# Patient Record
Sex: MALE | Race: WHITE | Employment: OTHER
[De-identification: names, ages, dates, MRNs, and addresses within clinical notes are randomized per-mention and may not be internally consistent; named-entity substitution may affect disease eponyms.]

---

## 2021-05-18 ENCOUNTER — NURSE TRIAGE (OUTPATIENT)
Dept: OTHER | Facility: CLINIC | Age: 61
End: 2021-05-18

## 2021-05-18 NOTE — TELEPHONE ENCOUNTER
Received call from Ana at pre-service center [facility]) Teressa with Red Flag Complaint. Brief description of triage: Called for new patient appointment with Dr. Franklin Sky    Triage indicates for patient to see PCP in 5 days    Care advice provided, patient verbalizes understanding; denies any other questions or concerns; instructed to call back for any new or worsening symptoms. Writer provided warm transfer to [facility] at [facility] for appointment scheduling. Attention Provider: Thank you for allowing me to participate in the care of your patient. The patient was connected to triage in response to information provided to the [facility]. Please do not respond through this encounter as the response is not directed to a shared pool. Reason for Disposition   Patient wants to be seen    Answer Assessment - Initial Assessment Questions  1. BLOOD PRESSURE: \"What is the blood pressure? \" \"Did you take at least two measurements 5 minutes apart?\"      200/114 yesterday, second check not done. Today 139/90    2. ONSET: \"When did you take your blood pressure? \"      Morning and night. ic verapamil 120 mg q day, metoprolol 100 mg bid, lisinopril 30 mg q day. Paroxetine 40 mg q day, Seroquel/quetiapine 200 mg bid, hydroxyzine 25 mg tid, atorvastatin 40 mg q day, aspirin 81 mg q day, metformin 1000 mg bid. fish oil 1000 mg q day      3. HOW: \"How did you obtain the blood pressure? \" (e.g., visiting nurse, automatic home BP monitor)      Home auto bp cuff    4. HISTORY: \"Do you have a history of high blood pressure? \"      Yes    5. MEDICATIONS: Ilya Dodge you taking any medications for blood pressure? \" \"Have you missed any doses recently? \"      Yes    6. OTHER SYMPTOMS: \"Do you have any symptoms? \" (e.g., headache, chest pain, blurred vision, difficulty breathing, weakness)      Denies    7. PREGNANCY: \"Is there any chance you are pregnant? \" \"When was your last menstrual period? \"      N/a    Protocols used: HIGH BLOOD PRESSURE-ADULT-OH

## 2021-05-19 RX ORDER — VERAPAMIL HYDROCHLORIDE 120 MG/1
120 TABLET, FILM COATED ORAL DAILY
COMMUNITY
End: 2021-07-02 | Stop reason: ALTCHOICE

## 2021-05-19 RX ORDER — LISINOPRIL 30 MG/1
30 TABLET ORAL DAILY
COMMUNITY
End: 2021-06-04 | Stop reason: SDUPTHER

## 2021-05-19 RX ORDER — HYDROXYZINE HYDROCHLORIDE 25 MG/1
25 TABLET, FILM COATED ORAL 3 TIMES DAILY PRN
COMMUNITY
End: 2021-06-04 | Stop reason: ALTCHOICE

## 2021-05-19 RX ORDER — ATORVASTATIN CALCIUM 40 MG/1
40 TABLET, FILM COATED ORAL DAILY
COMMUNITY
End: 2022-01-26

## 2021-05-19 RX ORDER — ASPIRIN 81 MG/1
81 TABLET ORAL DAILY
COMMUNITY

## 2021-05-19 RX ORDER — QUETIAPINE FUMARATE 200 MG/1
200 TABLET, FILM COATED ORAL 2 TIMES DAILY
COMMUNITY
End: 2022-01-26 | Stop reason: SDUPTHER

## 2021-05-19 RX ORDER — PAROXETINE HYDROCHLORIDE 40 MG/1
40 TABLET, FILM COATED ORAL EVERY MORNING
COMMUNITY
End: 2021-07-14 | Stop reason: DRUGHIGH

## 2021-05-19 RX ORDER — METOPROLOL TARTRATE 100 MG/1
100 TABLET ORAL 2 TIMES DAILY
COMMUNITY
End: 2021-07-02 | Stop reason: ALTCHOICE

## 2021-05-19 SDOH — ECONOMIC STABILITY: HOUSING INSECURITY
IN THE LAST 12 MONTHS, WAS THERE A TIME WHEN YOU DID NOT HAVE A STEADY PLACE TO SLEEP OR SLEPT IN A SHELTER (INCLUDING NOW)?: NO

## 2021-05-19 SDOH — ECONOMIC STABILITY: FOOD INSECURITY: WITHIN THE PAST 12 MONTHS, THE FOOD YOU BOUGHT JUST DIDN'T LAST AND YOU DIDN'T HAVE MONEY TO GET MORE.: NEVER TRUE

## 2021-05-19 SDOH — ECONOMIC STABILITY: TRANSPORTATION INSECURITY
IN THE PAST 12 MONTHS, HAS LACK OF TRANSPORTATION KEPT YOU FROM MEETINGS, WORK, OR FROM GETTING THINGS NEEDED FOR DAILY LIVING?: NO

## 2021-05-19 ASSESSMENT — COLUMBIA-SUICIDE SEVERITY RATING SCALE - C-SSRS
1. WITHIN THE PAST MONTH, HAVE YOU WISHED YOU WERE DEAD OR WISHED YOU COULD GO TO SLEEP AND NOT WAKE UP?: NO
2. HAVE YOU ACTUALLY HAD ANY THOUGHTS OF KILLING YOURSELF?: NO

## 2021-05-19 ASSESSMENT — PATIENT HEALTH QUESTIONNAIRE - PHQ9
3. TROUBLE FALLING OR STAYING ASLEEP: 0
4. FEELING TIRED OR HAVING LITTLE ENERGY: 3
7. TROUBLE CONCENTRATING ON THINGS, SUCH AS READING THE NEWSPAPER OR WATCHING TELEVISION: 3
1. LITTLE INTEREST OR PLEASURE IN DOING THINGS: 3
6. FEELING BAD ABOUT YOURSELF - OR THAT YOU ARE A FAILURE OR HAVE LET YOURSELF OR YOUR FAMILY DOWN: 0
SUM OF ALL RESPONSES TO PHQ9 QUESTIONS 1 & 2: 6
2. FEELING DOWN, DEPRESSED OR HOPELESS: 3
8. MOVING OR SPEAKING SO SLOWLY THAT OTHER PEOPLE COULD HAVE NOTICED. OR THE OPPOSITE, BEING SO FIGETY OR RESTLESS THAT YOU HAVE BEEN MOVING AROUND A LOT MORE THAN USUAL: 0
SUM OF ALL RESPONSES TO PHQ QUESTIONS 1-9: 14
5. POOR APPETITE OR OVEREATING: 2
SUM OF ALL RESPONSES TO PHQ QUESTIONS 1-9: 14

## 2021-05-20 ENCOUNTER — VIRTUAL VISIT (OUTPATIENT)
Dept: FAMILY MEDICINE CLINIC | Age: 61
End: 2021-05-20
Payer: COMMERCIAL

## 2021-05-20 DIAGNOSIS — F32.A DEPRESSION, UNSPECIFIED DEPRESSION TYPE: ICD-10-CM

## 2021-05-20 DIAGNOSIS — I10 ESSENTIAL HYPERTENSION: ICD-10-CM

## 2021-05-20 DIAGNOSIS — Z76.89 ENCOUNTER TO ESTABLISH CARE: ICD-10-CM

## 2021-05-20 DIAGNOSIS — I10 UNCONTROLLED HYPERTENSION: Primary | ICD-10-CM

## 2021-05-20 DIAGNOSIS — F41.9 ANXIETY: ICD-10-CM

## 2021-05-20 DIAGNOSIS — E11.9 TYPE 2 DIABETES MELLITUS WITHOUT COMPLICATION, WITHOUT LONG-TERM CURRENT USE OF INSULIN (HCC): ICD-10-CM

## 2021-05-20 DIAGNOSIS — Z13.31 POSITIVE DEPRESSION SCREENING: ICD-10-CM

## 2021-05-20 PROCEDURE — G8421 BMI NOT CALCULATED: HCPCS | Performed by: NURSE PRACTITIONER

## 2021-05-20 PROCEDURE — 3017F COLORECTAL CA SCREEN DOC REV: CPT | Performed by: NURSE PRACTITIONER

## 2021-05-20 PROCEDURE — 3046F HEMOGLOBIN A1C LEVEL >9.0%: CPT | Performed by: NURSE PRACTITIONER

## 2021-05-20 PROCEDURE — 2022F DILAT RTA XM EVC RTNOPTHY: CPT | Performed by: NURSE PRACTITIONER

## 2021-05-20 PROCEDURE — 1036F TOBACCO NON-USER: CPT | Performed by: NURSE PRACTITIONER

## 2021-05-20 PROCEDURE — G8431 POS CLIN DEPRES SCRN F/U DOC: HCPCS | Performed by: NURSE PRACTITIONER

## 2021-05-20 PROCEDURE — G8427 DOCREV CUR MEDS BY ELIG CLIN: HCPCS | Performed by: NURSE PRACTITIONER

## 2021-05-20 PROCEDURE — 99204 OFFICE O/P NEW MOD 45 MIN: CPT | Performed by: NURSE PRACTITIONER

## 2021-05-20 RX ORDER — BUSPIRONE HYDROCHLORIDE 5 MG/1
5 TABLET ORAL 2 TIMES DAILY
Qty: 60 TABLET | Refills: 0 | Status: SHIPPED | OUTPATIENT
Start: 2021-05-20 | End: 2021-06-04 | Stop reason: DRUGHIGH

## 2021-05-20 ASSESSMENT — ENCOUNTER SYMPTOMS
WHEEZING: 0
EYE ITCHING: 0
COLOR CHANGE: 0
SORE THROAT: 0
DIARRHEA: 0
CONSTIPATION: 0
CHEST TIGHTNESS: 0
ABDOMINAL PAIN: 0
NAUSEA: 0
SHORTNESS OF BREATH: 0
SINUS PRESSURE: 0
COUGH: 0
EYE REDNESS: 0

## 2021-05-20 NOTE — PROGRESS NOTES
on Metformin for his sugars. Past Medical History:   Diagnosis Date    Depression     Diabetes mellitus ([address]     Hypertension        History reviewed. No pertinent surgical history. Social History     Socioeconomic History    Marital status: Single     Spouse name: Not on file    Number of children: Not on file    Years of education: Not on file    Highest education level: Not on file   Occupational History    Not on file   Tobacco Use    Smoking status: Never Smoker    Smokeless tobacco: Never Used   Vaping Use    Vaping Use: Never used   Substance and Sexual Activity    Alcohol use: Not Currently    Drug use: Not Currently    Sexual activity: Not Currently   Other Topics Concern    Not on file   Social History Narrative    Not on file     Social Determinants of Health     Financial Resource Strain: Low Risk     Difficulty of Paying Living Expenses: Not hard at all   Food Insecurity: No Food Insecurity    Worried About [facility] [employer] in the Last Year: Never true    [place] in the Last Year: Never true   Transportation Needs: No Transportation Needs    Lack of Transportation (Medical): No    Lack of Transportation (Non-Medical):  No   Physical Activity:     Days of Exercise per Week:     Minutes of Exercise per Session:    Stress:     Feeling of Stress :    Social Connections:     Frequency of Communication with Friends and Family:     Frequency of Social Gatherings with Friends and Family:     Attends [employer] Services:     Active Member of Clubs or Organizations:     Attends Club or Organization Meetings:     Marital Status:    Intimate Partner Violence:     Fear of Current or Ex-Partner:     Emotionally Abused:     Physically Abused:     Sexually Abused:        Family History   Problem Relation Age of Onset    High Blood Pressure Mother     Depression Mother     Heart Disease Father     No Known Problems Sister     No Known Problems Brother     Heart Disease Maternal Grandmother     High Blood Pressure Maternal Grandmother     Heart Disease Maternal Grandfather     High Blood Pressure Maternal Grandfather        Current Outpatient Medications   Medication Sig Dispense Refill    metFORMIN (GLUCOPHAGE) 1000 MG tablet Take 1,000 mg by mouth 2 times daily (with meals)      metoprolol (LOPRESSOR) 100 MG tablet Take 100 mg by mouth 2 times daily      atorvastatin (LIPITOR) 40 MG tablet Take 40 mg by mouth daily      verapamil (CALAN) 120 MG tablet Take 120 mg by mouth daily      lisinopril (PRINIVIL;ZESTRIL) 30 MG tablet Take 30 mg by mouth daily      QUEtiapine (SEROQUEL) 200 MG tablet Take 200 mg by mouth 2 times daily      PARoxetine (PAXIL) 40 MG tablet Take 40 mg by mouth every morning      Omega-3 Fatty Acids (FISH OIL PO) Take 1,000 mg by mouth 3 times daily      hydrOXYzine (ATARAX) 25 MG tablet Take 25 mg by mouth 3 times daily as needed for Itching      aspirin 81 MG EC tablet Take 81 mg by mouth daily       No current facility-administered medications for this visit. No Known Allergies    No results found for any previous visit. Review of Systems   Constitutional: Negative for activity change, chills, fatigue, fever and unexpected weight change. HENT: Negative for sinus pressure and sore throat. Eyes: Negative for redness, itching and visual disturbance. Respiratory: Negative for cough, chest tightness, shortness of breath and wheezing. Cardiovascular: Negative for chest pain, palpitations and leg swelling. Gastrointestinal: Negative for abdominal pain, constipation, diarrhea and nausea. Endocrine: Negative for polydipsia, polyphagia and polyuria. Musculoskeletal: Negative for arthralgias, joint swelling and myalgias. Skin: Negative for color change, pallor and rash. Allergic/Immunologic: Negative for environmental allergies, food allergies and immunocompromised state.    Neurological: Positive for light-headedness. Negative for dizziness, syncope, weakness and headaches. Hematological: Does not bruise/bleed easily. Psychiatric/Behavioral: Positive for dysphoric mood and sleep disturbance. Negative for behavioral problems and hallucinations. The patient is nervous/anxious. There were no vitals taken for this visit. Patient-Reported Vitals 5/19/2021   Patient-Reported Weight 227   Patient-Reported Height 5'8\"   Patient-Reported Systolic 817   Patient-Reported Diastolic 775          Physical Exam  Constitutional:       Appearance: Normal appearance. He is obese. He is not ill-appearing. HENT:      Head: Normocephalic and atraumatic. Right Ear: External ear normal.      Left Ear: External ear normal.      Nose: Nose normal. No rhinorrhea. Mouth/Throat:      Mouth: Mucous membranes are moist.      Pharynx: Oropharynx is clear. Eyes:      General:         Right eye: No discharge. Left eye: No discharge. Conjunctiva/sclera: Conjunctivae normal.   Neck:      Trachea: Phonation normal.   Cardiovascular:      Comments: bp reading 212/118  Pulmonary:      Effort: Pulmonary effort is normal. No respiratory distress. Musculoskeletal:      Cervical back: Normal range of motion. Skin:     Coloration: Skin is not jaundiced or pale. Neurological:      General: No focal deficit present. Mental Status: He is alert and oriented to person, place, and time. Psychiatric:         Mood and Affect: Mood normal.         Behavior: Behavior normal.         Thought Content: Thought content normal.         Judgment: Judgment normal.         Diagnosis    ICD-10-CM    1. Uncontrolled hypertension  I10    2. Positive depression screening  Z13.31 Positive Screen for Clinical Depression with a Documented Follow-up Plan    3. Depression, unspecified depression type  F32.9    4.  Type 2 diabetes mellitus without complication, without long-term current use of insulin (HCC)  E11.9    5. Essential hypertension  I10    6. Encounter to establish care  Z76.89    7. Anxiety  F41.9        Assessment andPlan    Stop hydroxyzine since his elevations in blood pressure occurred when the hydroxyzine was started. Would definitely benefit from seeing a counselor and probably psychiatry  Blood pressure is currently uncontrolled, appointment made for the office for tomorrow where an EKG will be done, continue current medications I do feel that his anxiety is playing a role in his elevations in blood pressure. We will likely need referral to cardiology  We will place orders for lab work  Attempt to get records from previous provider  Start BuSpar for anxiety in addition to his Paxil and his Seroquel  Diabetes, appears currently stable we will get new hemoglobin A1c  Signs and symptoms to report to the emergency room discussed and patient is agreeable  Follow-up in office tomorrow  Orders Placed This Encounter   Procedures    Comprehensive Metabolic Panel     Standing Status:   Future     Standing Expiration Date:   5/20/2022    CBC Auto Differential     Standing Status:   Future     Standing Expiration Date:   5/20/2022    Hemoglobin A1C     Standing Status:   Future     Standing Expiration Date:   5/20/2022    TSH WITH REFLEX TO FT4     Standing Status:   Future     Standing Expiration Date:   5/20/2022    Lipid Panel     Standing Status:   Future     Standing Expiration Date:   5/20/2022     Order Specific Question:   Is Patient Fasting?/# of Hours     Answer:   no    Positive Screen for Clinical Depression with a Documented Follow-up Plan        Orders Placed This Encounter   Medications    busPIRone (BUSPAR) 5 MG tablet     Sig: Take 1 tablet by mouth 2 times daily     Dispense:  60 tablet     Refill:  0       Return keep appointment tomorrow. Isabella Gómez, was evaluated through a synchronous (real-time) audio-video encounter.  The patient (or guardian if applicable) is aware that this is a billable service. Verbal consent to proceed has been obtained within the past 12 months. The visit was conducted pursuant to the emergency declaration under the [facility] [address] authority and the [employer] Act. Patient identification was verified, and a caregiver was present when appropriate. The patient was located in a state where the provider was credentialed to provide care. Total time spent for this encounter: Not billed by time    --JOSE RAMON Abreu CNP on 5/20/2021 at 8:41 AM    An electronic signature was used to authenticate this note. This visit was completed virtually using [email or web address]

## 2021-05-21 ENCOUNTER — OFFICE VISIT (OUTPATIENT)
Dept: FAMILY MEDICINE CLINIC | Age: 61
End: 2021-05-21
Payer: COMMERCIAL

## 2021-05-21 VITALS
OXYGEN SATURATION: 95 % | SYSTOLIC BLOOD PRESSURE: 158 MMHG | DIASTOLIC BLOOD PRESSURE: 96 MMHG | RESPIRATION RATE: 18 BRPM | WEIGHT: 228 LBS | TEMPERATURE: 97.9 F | HEART RATE: 70 BPM

## 2021-05-21 DIAGNOSIS — F32.A DEPRESSION, UNSPECIFIED DEPRESSION TYPE: ICD-10-CM

## 2021-05-21 DIAGNOSIS — F41.9 ANXIETY: ICD-10-CM

## 2021-05-21 DIAGNOSIS — R06.83 SNORING: ICD-10-CM

## 2021-05-21 DIAGNOSIS — E11.9 TYPE 2 DIABETES MELLITUS WITHOUT COMPLICATION, WITHOUT LONG-TERM CURRENT USE OF INSULIN (HCC): ICD-10-CM

## 2021-05-21 DIAGNOSIS — I10 UNCONTROLLED HYPERTENSION: ICD-10-CM

## 2021-05-21 DIAGNOSIS — I10 ESSENTIAL HYPERTENSION: Primary | ICD-10-CM

## 2021-05-21 LAB — HBA1C MFR BLD: 5.8 %

## 2021-05-21 PROCEDURE — 2022F DILAT RTA XM EVC RTNOPTHY: CPT | Performed by: NURSE PRACTITIONER

## 2021-05-21 PROCEDURE — 1036F TOBACCO NON-USER: CPT | Performed by: NURSE PRACTITIONER

## 2021-05-21 PROCEDURE — G8421 BMI NOT CALCULATED: HCPCS | Performed by: NURSE PRACTITIONER

## 2021-05-21 PROCEDURE — 93000 ELECTROCARDIOGRAM COMPLETE: CPT | Performed by: NURSE PRACTITIONER

## 2021-05-21 PROCEDURE — 3017F COLORECTAL CA SCREEN DOC REV: CPT | Performed by: NURSE PRACTITIONER

## 2021-05-21 PROCEDURE — 83036 HEMOGLOBIN GLYCOSYLATED A1C: CPT | Performed by: NURSE PRACTITIONER

## 2021-05-21 PROCEDURE — 3044F HG A1C LEVEL LT 7.0%: CPT | Performed by: NURSE PRACTITIONER

## 2021-05-21 PROCEDURE — G8427 DOCREV CUR MEDS BY ELIG CLIN: HCPCS | Performed by: NURSE PRACTITIONER

## 2021-05-21 PROCEDURE — 99214 OFFICE O/P EST MOD 30 MIN: CPT | Performed by: NURSE PRACTITIONER

## 2021-05-21 ASSESSMENT — ENCOUNTER SYMPTOMS
SHORTNESS OF BREATH: 0
GASTROINTESTINAL NEGATIVE: 1
WHEEZING: 0
CHEST TIGHTNESS: 0
COUGH: 0

## 2021-05-21 NOTE — PATIENT INSTRUCTIONS
Continue buspar twice daily until Monday and then increase to three times a day  Continue all blood pressure medications  Decrease metformin to 1/2 tablet twice daily  Schedule appointment for sleep evaluation

## 2021-05-21 NOTE — PROGRESS NOTES
5/21/2021    This is a 56 y.o. male   Chief Complaint   Patient presents with    Hypertension     BP was over 200's at yesterdays [facility] appt. this morning was 115/80.  Anxiety     going on for atleast 1 month. unknown what is cause     HTN: Blood pressures at home are running in the 200s over 100s primarily in the evening in the morning blood pressures are 120s 130s over 80s. He is currently on metoprolol, lisinopril, verapamil. He is not seeing cardiology. He denies any specific chest pain or palpitations. Overall he attributes his elevated blood pressures to his anxiety. DM: Longstanding history of diabetes he does not check his blood sugars. Currently on Metformin at thousand twice daily. Denies any symptoms of hyper or hypoglycemia but later indicated that he was having symptoms of hypoglycemia. Anxiety: States he has been an anxious man most of his life says that his anxiety runs in his family. Currently on Paxil 40, Seroquel 200 twice daily, and BuSpar 5 mg twice daily started yesterday. He was previously on hydroxyzine started by his last provider but states his blood pressures have gone up since starting. Hydroxyzine was stopped yesterday. He still complains of anxiety symptoms without any known triggers, difficulty sleeping difficulty concentrating and overall feeling anxious. Previously he has talked to a counselor, but did not feel like it did much for him. His last provider suggested that he go up to 60 mg on his Paxil but he has not done that yet. Past Medical History:   Diagnosis Date    Depression     Diabetes mellitus ([facility] [address]     Hypertension        No past surgical history on file.     Social History     Socioeconomic History    Marital status: Single     Spouse name: Not on file    Number of children: Not on file    Years of education: Not on file    Highest education level: Not on file   Occupational History    Not on file   Tobacco Use    Smoking status: Never Smoker    Smokeless tobacco: Never Used   Vaping Use    Vaping Use: Never used   Substance and Sexual Activity    Alcohol use: Not Currently    Drug use: Not Currently    Sexual activity: Not Currently   Other Topics Concern    Not on file   Social History Narrative    Not on file     Social Determinants of Health     Financial Resource Strain: Low Risk     Difficulty of Paying Living Expenses: Not hard at all   Food Insecurity: No Food Insecurity    Worried About Running Out of Food in the Last Year: Never true    Milagro of Food in the Last Year: Never true   Transportation Needs: No Transportation Needs    Lack of Transportation (Medical): No    Lack of Transportation (Non-Medical):  No   Physical Activity:     Days of Exercise per Week:     Minutes of Exercise per Session:    Stress:     Feeling of Stress :    Social Connections:     Frequency of Communication with Friends and Family:     Frequency of Social Gatherings with Friends and Family:     Attends [employer] Services:     Active Member of Clubs or Organizations:     Attends Club or Organization Meetings:     Marital Status:    Intimate Partner Violence:     Fear of Current or Ex-Partner:     Emotionally Abused:     Physically Abused:     Sexually Abused:        Family History   Problem Relation Age of Onset    Heart Disease Mother     High Blood Pressure Mother     Depression Mother     Heart Disease Father     Heart Attack Father     High Blood Pressure Sister     No Known Problems Brother     Heart Disease Maternal Grandmother     High Blood Pressure Maternal Grandmother     Heart Disease Maternal Grandfather     High Blood Pressure Maternal Grandfather     Heart Disease Paternal Grandmother     High Blood Pressure Paternal Grandmother     Heart Disease Paternal Grandfather     High Blood Pressure Paternal Grandfather        Current Outpatient Medications   Medication Sig Dispense Refill    busPIRone (BUSPAR) 5 MG tablet Take 1 tablet by mouth 2 times daily 60 tablet 0    metFORMIN (GLUCOPHAGE) 1000 MG tablet Take 1,000 mg by mouth 2 times daily (with meals)      metoprolol (LOPRESSOR) 100 MG tablet Take 100 mg by mouth 2 times daily      atorvastatin (LIPITOR) 40 MG tablet Take 40 mg by mouth daily      verapamil (CALAN) 120 MG tablet Take 120 mg by mouth daily      lisinopril (PRINIVIL;ZESTRIL) 30 MG tablet Take 30 mg by mouth daily      QUEtiapine (SEROQUEL) 200 MG tablet Take 200 mg by mouth 2 times daily      PARoxetine (PAXIL) 40 MG tablet Take 40 mg by mouth every morning      Omega-3 Fatty Acids (FISH OIL PO) Take 1,000 mg by mouth 3 times daily      hydrOXYzine (ATARAX) 25 MG tablet Take 25 mg by mouth 3 times daily as needed for Itching      aspirin 81 MG EC tablet Take 81 mg by mouth daily       No current facility-administered medications for this visit. No Known Allergies    No results found for any previous visit. Review of Systems   Constitutional: Positive for fatigue. Negative for activity change, diaphoresis, fever and unexpected weight change. HENT: Negative. Respiratory: Negative for cough, chest tightness, shortness of breath and wheezing. Snores   Cardiovascular: Negative for chest pain, palpitations and leg swelling. Gastrointestinal: Negative. Endocrine:        Episodes of confusion and hypoglycemic symptoms at least a few times a week, does not monitor sugars, does not watch diet   Genitourinary: Negative. Musculoskeletal: Negative. Neurological: Positive for dizziness (attributes this to anxiety), light-headedness and numbness (feet). Negative for seizures. Psychiatric/Behavioral: Positive for dysphoric mood and sleep disturbance. The patient is nervous/anxious.          Scribes himself as a anxious rohith, has always been this way       BP (!) 158/96 (Site: Left Upper Arm, Position: Sitting)   Pulse 70   Temp 97.9 °F (36.6 °C) (Infrared)   Resp 18   Wt Intact  Vibration (128 Hz): N/A     Lymphadenopathy:      Cervical: No cervical adenopathy. Skin:     General: Skin is warm and dry. Coloration: Skin is not pale. Findings: No erythema or rash. Neurological:      General: No focal deficit present. Mental Status: He is alert and oriented to person, place, and time. Motor: No abnormal muscle tone. Coordination: Coordination normal.   Psychiatric:         Behavior: Behavior normal.         Thought Content: Thought content normal.         Judgment: Judgment normal.         Diagnosis    ICD-10-CM    1. Essential hypertension  I10 EKG 12 Lead     [facility] Pulmonology     CANCELED: CBC Auto Differential     CANCELED: HEMOGLOBIN A1C     CANCELED: Lipid Panel     CANCELED: TSH WITH REFLEX TO FT4   2. Anxiety  F41.9 [facility] Pul[facility]ology     CANCELED: TSH WITH REFLEX TO FT4   3. Type 2 diabetes mellitus without complication, without long-term current use of insulin (HCC)  E11.9 POCT glycosylated hemoglobin (Hb A1C)     Diabetic Foot Exam     CANCELED: Lipid Panel     CANCELED: TSH WITH REFLEX TO FT4     CANCELED: Hemoglobin A1C     CANCELED: CBC Auto Differential     CANCELED: Comprehensive Metabolic Panel     CANCELED: COMPREHENSIVE METABOLIC PANEL     CANCELED: Lipid Panel     CANCELED: TSH WITH REFLEX TO FT4   4. Snoring  R06.83 [facility] Pulmonology   5. Uncontrolled hypertension  I10 CANCELED: Lipid Panel     CANCELED: TSH WITH REFLEX TO FT4     CANCELED: Hemoglobin A1C     CANCELED: CBC Auto Differential     CANCELED: Comprehensive Metabolic Panel     CANCELED: HEMOGLOBIN A1C     CANCELED: Lipid Panel   6.  Depression, unspecified depression type  F32.9 CANCELED: TSH WITH REFLEX TO FT4       Assessment andPlan    Hypertension appears to be very labile, well controlled in the morning  Continue current medications  Check labs today  Placing referral for sleep study as this could be contributing to the hypertension  Anxiety is definitely playing a role, continue BuSpar twice daily until Monday and then increase to 3 times a day  We will likely need referral to psychiatry  Diabetes, hemoglobin A1c 5.8 today with patient now voicing episodes of hypoglycemia at least 2 or 3 times a week we have decreased Metformin to 500 twice daily  Orders Placed This Encounter   Procedures   [facility] Pulmonology     Referral Priority:   Routine     Referral Type:   Eval and Treat     Referral Reason:   Specialty Services Required     Requested Specialty:   Pulmonology     Number of Visits Requested:   1    POCT glycosylated hemoglobin (Hb A1C)    EKG 12 Lead     Scheduling Instructions:      Physical     Order Specific Question:   Reason for Exam?     Answer: Other    Diabetic Foot Exam       No orders of the defined types were placed in this encounter. Return in about 2 weeks (around 6/4/2021).   For anxiety and blood pressure, if blood pressures remain elevated will place referral to cardiology

## 2021-06-04 ENCOUNTER — OFFICE VISIT (OUTPATIENT)
Dept: FAMILY MEDICINE CLINIC | Age: 61
End: 2021-06-04
Payer: COMMERCIAL

## 2021-06-04 VITALS
DIASTOLIC BLOOD PRESSURE: 98 MMHG | SYSTOLIC BLOOD PRESSURE: 172 MMHG | RESPIRATION RATE: 18 BRPM | TEMPERATURE: 96.6 F | OXYGEN SATURATION: 98 % | HEART RATE: 87 BPM | WEIGHT: 223 LBS

## 2021-06-04 DIAGNOSIS — I10 UNCONTROLLED HYPERTENSION: ICD-10-CM

## 2021-06-04 DIAGNOSIS — F41.9 ANXIETY: Primary | ICD-10-CM

## 2021-06-04 DIAGNOSIS — F32.A DEPRESSION, UNSPECIFIED DEPRESSION TYPE: ICD-10-CM

## 2021-06-04 PROCEDURE — G8427 DOCREV CUR MEDS BY ELIG CLIN: HCPCS | Performed by: NURSE PRACTITIONER

## 2021-06-04 PROCEDURE — G8421 BMI NOT CALCULATED: HCPCS | Performed by: NURSE PRACTITIONER

## 2021-06-04 PROCEDURE — 3017F COLORECTAL CA SCREEN DOC REV: CPT | Performed by: NURSE PRACTITIONER

## 2021-06-04 PROCEDURE — 1036F TOBACCO NON-USER: CPT | Performed by: NURSE PRACTITIONER

## 2021-06-04 PROCEDURE — 99214 OFFICE O/P EST MOD 30 MIN: CPT | Performed by: NURSE PRACTITIONER

## 2021-06-04 RX ORDER — BUSPIRONE HYDROCHLORIDE 10 MG/1
10 TABLET ORAL 3 TIMES DAILY
Qty: 90 TABLET | Refills: 0 | Status: SHIPPED | OUTPATIENT
Start: 2021-06-04 | End: 2021-06-28

## 2021-06-04 RX ORDER — LISINOPRIL 40 MG/1
40 TABLET ORAL DAILY
Qty: 30 TABLET | Refills: 0 | Status: SHIPPED | OUTPATIENT
Start: 2021-06-04 | End: 2021-06-28

## 2021-06-04 ASSESSMENT — ENCOUNTER SYMPTOMS
SINUS PAIN: 0
EYE PAIN: 0
CHEST TIGHTNESS: 0
DIARRHEA: 0
SINUS PRESSURE: 0
NAUSEA: 0
EYE DISCHARGE: 0
CONSTIPATION: 0
COUGH: 0
EYE ITCHING: 0
SORE THROAT: 0
EYE REDNESS: 0
VOMITING: 0
SHORTNESS OF BREATH: 0
RHINORRHEA: 0

## 2021-06-04 NOTE — PROGRESS NOTES
6/4/2021    This is a 56 y.o. male   Chief Complaint   Patient presents with    Anxiety     pt states he still has anxiety \"real bad\" and the medication is not helping    Hypertension     still high   . HPI: Beryle Char is here to follow up on his hypertension and anxiety. HTN: Has been taking medications as prescribed. Regularly checks BP at home. Blood pressures 140s-180's/ 100's. Denies chest pain, palpitations, or shortness of breath. Says he has experienced increased fatigue and is unable to exercise. Denies orthopnea. Anxiety: Anxiety has not improved. Patient says this all started about a year ago, but he's been on anxiety and mental health meds for many years. He cant attribute anything to his anxiety other than the pandemic. He has never had counseling to address his anxiety. The anxiety is worse in the morning and throughout the day. Seems to get better at night. No issues sleeping, but states he has to take his paxil at night in order to sleep. He wants to start ativan because he feels the buspar is not helping. However he has only been taking the buspar 3 times daily for 2 weeks.       Patient Active Problem List   Diagnosis    Depression    Diabetes mellitus ([facility] [address]    Hypertension    Anxiety       Current Outpatient Medications   Medication Sig Dispense Refill    busPIRone (BUSPAR) 5 MG tablet Take 1 tablet by mouth 2 times daily 60 tablet 0    metFORMIN (GLUCOPHAGE) 1000 MG tablet Take 1,000 mg by mouth 2 times daily (with meals)      metoprolol (LOPRESSOR) 100 MG tablet Take 100 mg by mouth 2 times daily      atorvastatin (LIPITOR) 40 MG tablet Take 40 mg by mouth daily      verapamil (CALAN) 120 MG tablet Take 120 mg by mouth daily      lisinopril (PRINIVIL;ZESTRIL) 30 MG tablet Take 30 mg by mouth daily      QUEtiapine (SEROQUEL) 200 MG tablet Take 200 mg by mouth 2 times daily      PARoxetine (PAXIL) 40 MG tablet Take 40 mg by mouth every morning      Omega-3 Fatty Acids (FISH Refill:  0    lisinopril (PRINIVIL;ZESTRIL) 40 MG tablet     Sig: Take 1 tablet by mouth daily     Dispense:  30 tablet     Refill:  0       Patient Education:  Plan    Return in about 4 weeks (around 7/2/2021) for Mental health.

## 2021-06-14 ENCOUNTER — TELEPHONE (OUTPATIENT)
Dept: FAMILY MEDICINE CLINIC | Age: 61
End: 2021-06-14

## 2021-06-14 ENCOUNTER — VIRTUAL VISIT (OUTPATIENT)
Dept: PSYCHOLOGY | Age: 61
End: 2021-06-14
Payer: COMMERCIAL

## 2021-06-14 DIAGNOSIS — F41.9 ANXIETY: Primary | ICD-10-CM

## 2021-06-14 PROCEDURE — 90791 PSYCH DIAGNOSTIC EVALUATION: CPT | Performed by: SOCIAL WORKER

## 2021-06-14 NOTE — PROGRESS NOTES
[address], [address] [place] [address] St:  Patient presents with concerns about anxiety and reports symptoms as \"nervous\" \"lightheaded\" \"brain fog. \"  Pt reports that he takes Paxil and Seroquel at night, which help with sleep. He denies sleep problems. Pt states \"all day I'm very nervous. \" Symptoms have been present for 6 weeks. Pt was not able to identify triggers stating symptoms \"came for no reason. \" Patient states \"in the evening I feel better, in the daytime I feel very nervous. \" Goals for treatment include \"feel better\" by decreasing anxiety symptoms. .    Patient started buspirone three weeks ago, denies side effects, but does not feel that the medication is helping him. Pt states that he takes it as prescribed. Pt was encouraged to continue to take medications as prescribed, and f/u with PCP in a couple of weeks to determine efficacy. Pt reports having sad mood, poor appetite, stating \"I'm not hungry,\" but he continues to eat 3 meals/day. He reports no sleep issues due to taking medications, low energy, anhedonia. Patient endorses fatigue. Patient states that depressive symptoms are a result of anxiety, and states potential trigger could be the COVID pandemic. Patient reports having \"extremely high blood pressure. \" When patient wakes up his blood pressure is controlled, but by the afternoon it increases to high numbers. Patient reports \"I get nervous before I check my blood pressure. \" \"I get nervous and I don't know why. \"     Patient states that he has been taking Seroquel for 15 years, but feels like it doesn't work during the day. He states that he started taking it 15 years ago for same reasons (anxiety). Patient lives with mother and cousin. Patient does not work. Daily routine is non-existent. Daily caffeine use 1 cup of tea/day. No cigarette use, no vaping. No alcohol use. No illegal drug use. Patient spends 6-8 hours a day on social media or watching TV, \"watches the news a lot. \" There is no regular exercise. Patient describes normal sleep pattern due to taking medications. Patient enjoys the following hobbies: cutting grass. Patient describes family (mom, cousin, brother, sister) as social support. Patient identifies with Baptism (Christianity/spirituality/culture). Family history is positive for anxiety.     O:  MSE:    Appearance: good hygiene   Attitude: cooperative and friendly  Consciousness: alert  Orientation: oriented to person, place, time, general circumstance  Memory    recent and remote memory intact  Attention/Concentration    intact  Psychomotor Activity:normal  Eye Contact: normal  Speech: normal rate and volume, well-articulated  Mood    Anxious  Affect    flat affect  Perception: within normal limits  Thought Content: within normal limits  Thought Process: logical, coherent and goal-directed  Associations    logical connections  Insight: fair  Judgment    Intact  Appetite poor  Sleep disturbance No  Fatigue No  Loss of pleasure Yes  Impulsive behavior No  Morbid Ideation: no   Suicide Assessment: no suicidal ideation, plan, or intent  Homicidal Ideation: no    History:    Medications:   Current Outpatient Medications   Medication Sig Dispense Refill    busPIRone (BUSPAR) 10 MG tablet Take 1 tablet by mouth 3 times daily 90 tablet 0    lisinopril (PRINIVIL;ZESTRIL) 40 MG tablet Take 1 tablet by mouth daily 30 tablet 0    metFORMIN (GLUCOPHAGE) 1000 MG tablet Take 1,000 mg by mouth 2 times daily (with meals)      metoprolol (LOPRESSOR) 100 MG tablet Take 100 mg by mouth 2 times daily      atorvastatin (LIPITOR) 40 MG tablet Take 40 mg by mouth daily      verapamil (CALAN) 120 MG tablet Take 120 mg by mouth daily      QUEtiapine (SEROQUEL) 200 MG tablet Take 200 mg by mouth 2 times daily      PARoxetine (PAXIL) 40 MG tablet Take 40 mg by mouth every morning      Omega-3 Fatty Acids (FISH OIL PO) Take 1,000 mg by mouth 3 times daily      aspirin 81 MG EC tablet Take 81 mg by mouth daily       No current facility-administered medications for this visit. Social History:   Social History     Socioeconomic History    Marital status: Single     Spouse name: Not on file    Number of children: Not on file    Years of education: Not on file    Highest education level: Not on file   Occupational History    Not on file   Tobacco Use    Smoking status: Never Smoker    Smokeless tobacco: Never Used   Vaping Use    Vaping Use: Never used   Substance and Sexual Activity    Alcohol use: Not Currently    Drug use: Not Currently    Sexual activity: Not Currently   Other Topics Concern    Not on file   Social History Narrative    Not on file     Social Determinants of Health     Financial Resource Strain: Low Risk     Difficulty of Paying Living Expenses: Not hard at all   Food Insecurity: No Food Insecurity    Worried About [address] in the Last Year: Never true    [address] in the Last Year: Never true   Transportation Needs: No Transportation Needs    Lack of Transportation (Medical): No    Lack of Transportation (Non-Medical): No   Physical Activity:     Days of Exercise per Week:     Minutes of Exercise per Session:    Stress:     Feeling of Stress :    Social Connections:     Frequency of Communication with Friends and Family:     Frequency of Social Gatherings with Friends and Family:     Attends Nondenominational Services:     Active Member of Clubs or Organizations:     Attends Club or Organization Meetings:     Marital Status:    Intimate Partner Violence:     Fear of Current or Ex-Partner:     Emotionally Abused:     Physically Abused:     Sexually Abused:      TOBACCO:   reports that he has never smoked. He has never used smokeless tobacco.  ETOH:   reports previous alcohol use.   Family History:   Family History   Problem Relation Age of Onset    Heart Disease Mother     High Blood Pressure Mother     Depression Mother     Heart Disease Father     Heart Attack

## 2021-06-14 NOTE — PATIENT INSTRUCTIONS
1. Review handout on diaphragmatic (4-7-8) breathing. 2. F/U with Jade Calderonlizette in 2 weeks. 4 -7- 8 Breath Relaxation Exercise To manage Stress/Anxiety    Anyone can do it. ..    Simple    Quick    No equipment needed    Do it anywhere    Are the numbers important? The absolute time you spend on each phase is not important; the ratio of 4:7:8 is important. If you have trouble  holding your breath, speed the exercise up but keep to  the ratio of 4:7:8 for the three phases. With practice you  can slow it all down and get used to inhaling and exhaling more and more deeply. Why should I do it? This exercise is a natural tranquilizer for the nervous system. Unlike tranquilizing drugs, which are often effective  when you first take them but then lose their power over  time, this exercise is subtle when you first try it but gains  in power with repetition and practice. Use this new skill  whenever anything upsetting happens - before you react. Use it whenever you are aware of internal tension. Use it  to help you fall asleep. How often? Do it at least twice a day. You cannot do it too frequently. Do not do more than four breaths at one time for the first  month of practice. Later, if you wish, you can extend it to  eight breaths. If you feel a little lightheaded when you  first breathe this way, do not be concerned - it will pass. STEPS  Exhale completely through your mouth, making a whoosh sound. Close your mouth and inhale quietly through your nose to a mental count of 4. Hold your breath for a count of 7. Exhale completely through your mouth, making a whoosh sound to a count of 8. This is one breath. Now inhale again and repeat the cycle three more times for a total of four breaths. Beginner Tips:  Ideally, sit with your back straight. Place the tip of your tongue against the ridge of tissue just  behind your upper front teeth, and keep it there through the  entire exercise.   Exhale through your

## 2021-06-14 NOTE — TELEPHONE ENCOUNTER
[facility]   No Vm set up I sent a my chart message to advise he needs a follow up

## 2021-06-30 NOTE — PROGRESS NOTES
[address]   Cardiac Consultation    Referring Provider:  JOSE RAMON Michaels CNP     Chief Complaint   Patient presents with    New Patient     BP will go high with anxiety        History of Present Illness:  Dyanetta Him here as new pt for hypertension. He is states that he has anxiety and this makes his blood pressure go up. HTN present for years, has been more elevated recently. Essentially every day. No associated symptoms. Patient denies chest pain, sob, palpitations, dizziness or syncope. He can go up steps with no difficulty. He checks BP at home ranges > 150/100 in the morning and higher in the afternoon 180's/110. It's the same in both arms. He presents with cousin. He states that he is compliant with taking medications daily. Past Medical History:   has a past medical history of Depression, Diabetes mellitus ([address] and Hypertension. Surgical History:   has no past surgical history on file. Social History:   reports that he has never smoked. He has never used smokeless tobacco. He reports previous alcohol use. He reports previous drug use. Family History:  family history includes Depression in his mother; Heart Attack in his father; Heart Disease in his father, maternal grandfather, maternal grandmother, mother, paternal grandfather, and paternal grandmother; High Blood Pressure in his maternal grandfather, maternal grandmother, mother, paternal grandfather, paternal grandmother, and sister; No Known Problems in his brother. Home Medications:  Prior to Admission medications    Medication Sig Start Date End Date Taking?  Authorizing Provider   busPIRone (BUSPAR) 10 MG tablet TAKE 1 TABLET BY MOUTH THREE TIMES A DAY 6/28/21  Yes JOSE RAMON Michaels CNP   lisinopril (PRINIVIL;ZESTRIL) 40 MG tablet TAKE 1 TABLET BY MOUTH EVERY DAY 6/28/21  Yes JOSE RAMON Michaels CNP   metFORMIN (GLUCOPHAGE) 1000 MG tablet Take 1,000 mg by mouth 2 times daily (with meals)   Yes Historical Provider, MD   metoprolol (LOPRESSOR) 100 MG tablet Take 100 mg by mouth 2 times daily   Yes Historical Provider, MD   atorvastatin (LIPITOR) 40 MG tablet Take 40 mg by mouth daily   Yes Historical Provider, MD   verapamil (CALAN) 120 MG tablet Take 120 mg by mouth daily   Yes Historical Provider, MD   QUEtiapine (SEROQUEL) 200 MG tablet Take 200 mg by mouth 2 times daily   Yes Historical Provider, MD   PARoxetine (PAXIL) 40 MG tablet Take 40 mg by mouth every morning   Yes Historical Provider, MD   Omega-3 Fatty Acids (FISH OIL PO) Take 1,000 mg by mouth 3 times daily   Yes Historical Provider, MD   aspirin 81 MG EC tablet Take 81 mg by mouth daily   Yes Historical Provider, MD        Allergies:  Patient has no known allergies. Review of Systems:   · Constitutional: there has been no unanticipated weight loss. There's been no change in energy level, sleep pattern, or activity level. · Eyes: No visual changes or diplopia. No scleral icterus. · ENT: No Headaches, hearing loss or vertigo. No mouth sores or sore throat. · Cardiovascular: Reviewed in HPI  · Respiratory: No cough or wheezing, no sputum production. No hematemesis. · Gastrointestinal: No abdominal pain, appetite loss, blood in stools. No change in bowel or bladder habits. · Genitourinary: No dysuria, trouble voiding, or hematuria. · Musculoskeletal:  No gait disturbance, weakness or joint complaints. · Integumentary: No rash or pruritis. · Neurological: No headache, diplopia, change in muscle strength, numbness or tingling. No change in gait, balance, coordination, mood, affect, memory, mentation, behavior. · Psychiatric: No anxiety, no depression. · Endocrine: No malaise, fatigue or temperature intolerance. No excessive thirst, fluid intake, or urination. No tremor. · Hematologic/Lymphatic: No abnormal bruising or bleeding, blood clots or swollen lymph nodes.   · Allergic/Immunologic: No nasal congestion or hives.    Physical Examination:    Vitals:    07/02/21 0812   BP: 110/70   Pulse: 82   SpO2: 97%        BP recheck 122/80    Constitutional and General Appearance: NAD   Respiratory:  · Normal excursion and expansion without use of accessory muscles  · Resp Auscultation: Normal breath sounds without dullness  Cardiovascular:  · The apical impulses not displaced  · Heart tones are crisp and normal  · Cervical veins are not engorged  · The carotid upstroke is normal in amplitude and contour without delay or bruit  · Normal S1S2, No S3, No Murmur  · Peripheral pulses are symmetrical and full  · There is no clubbing, cyanosis of the extremities. · No edema  · Femoral Arteries: 2+ and equal  · Pedal Pulses: 2+ and equal   Abdomen:  · No masses or tenderness  · Liver/Spleen: No Abnormalities Noted  Neurological/Psychiatric:  · Alert and oriented in all spheres  · Moves all extremities well  · Exhibits normal gait balance and coordination  · No abnormalities of mood, affect, memory, mentation, or behavior are noted    EKG 5/21/2021  Sinus  Rhythm   WITHIN NORMAL LIMITS 63 BPM    Assessment:   HTN - asymptomatic, poor control  Anxiety - discussed relationship to elev BP and advised relaxation techniques. should further d/w PCP  Obesity - discussed relationship to HTN    Plan:  Stop lisinopril  Stop metoprolol  Increase Verapamil to 240 mg daily - take at night  Start Valsartan- -25 mg daily  Start Labetalol 100 mg twice a day  Renal artery ultrasound  Echocardiogram  LABS in 1-2 weeks BMP  Follow up in a month    This note was scribed in the presence of Toya Bosworth MD by Raghav Islas RN. The scribes documentation has been prepared under my direction and personally reviewed by me in its entirety. I confirm that the note above accurately reflects all work, treatment, procedures, and medical decision making performed by me.     Dr. Toya Bosworth, MD    Thank you for allowing me to participate in the care of this individual.      Radha Verde.  Anibal Davis M.D., Cristhian Gomez

## 2021-07-02 ENCOUNTER — OFFICE VISIT (OUTPATIENT)
Dept: CARDIOLOGY CLINIC | Age: 61
End: 2021-07-02
Payer: COMMERCIAL

## 2021-07-02 VITALS
WEIGHT: 223 LBS | HEART RATE: 82 BPM | OXYGEN SATURATION: 97 % | DIASTOLIC BLOOD PRESSURE: 70 MMHG | BODY MASS INDEX: 33.8 KG/M2 | HEIGHT: 68 IN | SYSTOLIC BLOOD PRESSURE: 110 MMHG

## 2021-07-02 DIAGNOSIS — F41.9 ANXIETY: ICD-10-CM

## 2021-07-02 DIAGNOSIS — I10 ESSENTIAL HYPERTENSION: Primary | ICD-10-CM

## 2021-07-02 PROCEDURE — 99244 OFF/OP CNSLTJ NEW/EST MOD 40: CPT | Performed by: INTERNAL MEDICINE

## 2021-07-02 PROCEDURE — G8427 DOCREV CUR MEDS BY ELIG CLIN: HCPCS | Performed by: INTERNAL MEDICINE

## 2021-07-02 PROCEDURE — G8417 CALC BMI ABV UP PARAM F/U: HCPCS | Performed by: INTERNAL MEDICINE

## 2021-07-02 PROCEDURE — 93975 VASCULAR STUDY: CPT | Performed by: INTERNAL MEDICINE

## 2021-07-02 RX ORDER — VERAPAMIL HYDROCHLORIDE 240 MG/1
240 TABLET, FILM COATED, EXTENDED RELEASE ORAL NIGHTLY
Qty: 90 TABLET | Refills: 3 | Status: SHIPPED | OUTPATIENT
Start: 2021-07-02 | End: 2022-06-27 | Stop reason: SDUPTHER

## 2021-07-02 RX ORDER — VALSARTAN AND HYDROCHLOROTHIAZIDE 320; 25 MG/1; MG/1
1 TABLET, FILM COATED ORAL DAILY
Qty: 90 TABLET | Refills: 3 | Status: SHIPPED | OUTPATIENT
Start: 2021-07-02 | End: 2022-06-27 | Stop reason: SDUPTHER

## 2021-07-02 RX ORDER — LABETALOL 100 MG/1
100 TABLET, FILM COATED ORAL 2 TIMES DAILY
Qty: 180 TABLET | Refills: 3 | Status: SHIPPED | OUTPATIENT
Start: 2021-07-02 | End: 2022-06-27 | Stop reason: SDUPTHER

## 2021-07-02 NOTE — PATIENT INSTRUCTIONS
Plan:  Stop lisinopril  Stop metoprolol  Stop verapamil 120 mg   Start Verapamil 240 mg daily  Start Valsartan- -25 mg daily  Start Labetalol 100 mg twice a day  Call [phone] to schedule Renal artery ultrasound and echocardiogram  LABS in 1-2 weeks BMP  Follow up in a month

## 2021-07-14 ENCOUNTER — OFFICE VISIT (OUTPATIENT)
Dept: FAMILY MEDICINE CLINIC | Age: 61
End: 2021-07-14
Payer: COMMERCIAL

## 2021-07-14 VITALS
SYSTOLIC BLOOD PRESSURE: 142 MMHG | OXYGEN SATURATION: 97 % | DIASTOLIC BLOOD PRESSURE: 90 MMHG | WEIGHT: 224 LBS | HEART RATE: 96 BPM | RESPIRATION RATE: 16 BRPM | TEMPERATURE: 97.3 F | BODY MASS INDEX: 34.06 KG/M2

## 2021-07-14 DIAGNOSIS — I10 ESSENTIAL HYPERTENSION: ICD-10-CM

## 2021-07-14 DIAGNOSIS — F41.9 ANXIETY: Primary | ICD-10-CM

## 2021-07-14 DIAGNOSIS — E11.9 TYPE 2 DIABETES MELLITUS WITHOUT COMPLICATION, WITHOUT LONG-TERM CURRENT USE OF INSULIN (HCC): ICD-10-CM

## 2021-07-14 PROCEDURE — 3044F HG A1C LEVEL LT 7.0%: CPT | Performed by: NURSE PRACTITIONER

## 2021-07-14 PROCEDURE — 2022F DILAT RTA XM EVC RTNOPTHY: CPT | Performed by: NURSE PRACTITIONER

## 2021-07-14 PROCEDURE — 3017F COLORECTAL CA SCREEN DOC REV: CPT | Performed by: NURSE PRACTITIONER

## 2021-07-14 PROCEDURE — G8417 CALC BMI ABV UP PARAM F/U: HCPCS | Performed by: NURSE PRACTITIONER

## 2021-07-14 PROCEDURE — 1036F TOBACCO NON-USER: CPT | Performed by: NURSE PRACTITIONER

## 2021-07-14 PROCEDURE — 99213 OFFICE O/P EST LOW 20 MIN: CPT | Performed by: NURSE PRACTITIONER

## 2021-07-14 PROCEDURE — G8427 DOCREV CUR MEDS BY ELIG CLIN: HCPCS | Performed by: NURSE PRACTITIONER

## 2021-07-14 RX ORDER — PAROXETINE HYDROCHLORIDE 20 MG/1
20 TABLET, FILM COATED ORAL 2 TIMES DAILY
Qty: 60 TABLET | Refills: 1 | Status: SHIPPED | OUTPATIENT
Start: 2021-07-14 | End: 2021-09-10

## 2021-07-14 RX ORDER — HYDROXYZINE HYDROCHLORIDE 25 MG/1
TABLET, FILM COATED ORAL
COMMUNITY
Start: 2021-07-05 | End: 2021-07-14 | Stop reason: ALTCHOICE

## 2021-07-14 ASSESSMENT — ENCOUNTER SYMPTOMS
NAUSEA: 0
SHORTNESS OF BREATH: 0
DIARRHEA: 0
CONSTIPATION: 0
VOMITING: 0
SINUS PRESSURE: 0
CHEST TIGHTNESS: 0
SINUS PAIN: 0
RHINORRHEA: 0

## 2021-07-14 ASSESSMENT — PATIENT HEALTH QUESTIONNAIRE - PHQ9
3. TROUBLE FALLING OR STAYING ASLEEP: 0
SUM OF ALL RESPONSES TO PHQ QUESTIONS 1-9: 9
6. FEELING BAD ABOUT YOURSELF - OR THAT YOU ARE A FAILURE OR HAVE LET YOURSELF OR YOUR FAMILY DOWN: 0
7. TROUBLE CONCENTRATING ON THINGS, SUCH AS READING THE NEWSPAPER OR WATCHING TELEVISION: 3
SUM OF ALL RESPONSES TO PHQ9 QUESTIONS 1 & 2: 3
4. FEELING TIRED OR HAVING LITTLE ENERGY: 3
8. MOVING OR SPEAKING SO SLOWLY THAT OTHER PEOPLE COULD HAVE NOTICED. OR THE OPPOSITE, BEING SO FIGETY OR RESTLESS THAT YOU HAVE BEEN MOVING AROUND A LOT MORE THAN USUAL: 0
SUM OF ALL RESPONSES TO PHQ QUESTIONS 1-9: 9
1. LITTLE INTEREST OR PLEASURE IN DOING THINGS: 0
SUM OF ALL RESPONSES TO PHQ QUESTIONS 1-9: 9
9. THOUGHTS THAT YOU WOULD BE BETTER OFF DEAD, OR OF HURTING YOURSELF: 0
5. POOR APPETITE OR OVEREATING: 0
10. IF YOU CHECKED OFF ANY PROBLEMS, HOW DIFFICULT HAVE THESE PROBLEMS MADE IT FOR YOU TO DO YOUR WORK, TAKE CARE OF THINGS AT HOME, OR GET ALONG WITH OTHER PEOPLE: 0
2. FEELING DOWN, DEPRESSED OR HOPELESS: 3

## 2021-07-14 NOTE — PROGRESS NOTES
7/14/2021    This is a 59 y.o. male   Chief Complaint   Patient presents with    1 Month Follow-Up     high BP and dieting    . Alejandra is here to follow up on his mood, blood pressure, and diabetes. Anxiety: He is still experiencing anxiety on his current medications. He has the most relief at night when he takes his Paxil and Seroquel together. He did see the counselor via [employer] last month and felt like the visit went fine, but was not helpful for his anxiety. He has not checked with his insurance about psychiatrists in his network. Blood pressure: He is taking the medications as prescribed by the cardiologist. His home BP readings have been 130's-140's over 80's. He denies any side effects from the new medications. He denies any chest pain, palpitations, or shortness of breath. Diabetes: Taking medications as prescribed. Does not check sugars at home. Denies any symptoms of hypoglycemia.         Patient Active Problem List   Diagnosis    Depression    Diabetes mellitus ([facility] [address]    Hypertension    Anxiety       Current Outpatient Medications   Medication Sig Dispense Refill    hydrOXYzine (ATARAX) 25 MG tablet TAKE 1 TABLET BY MOUTH EVERY 8 HOURS AS NEEDED      valsartan-hydroCHLOROthiazide (DIOVAN-HCT) 320-25 MG per tablet Take 1 tablet by mouth daily 90 tablet 3    labetalol (NORMODYNE) 100 MG tablet Take 1 tablet by mouth 2 times daily 180 tablet 3    verapamil (CALAN SR) 240 MG extended release tablet Take 1 tablet by mouth nightly 90 tablet 3    busPIRone (BUSPAR) 10 MG tablet TAKE 1 TABLET BY MOUTH THREE TIMES A DAY 90 tablet 2    metFORMIN (GLUCOPHAGE) 1000 MG tablet Take 1,000 mg by mouth 2 times daily (with meals)      atorvastatin (LIPITOR) 40 MG tablet Take 40 mg by mouth daily      QUEtiapine (SEROQUEL) 200 MG tablet Take 200 mg by mouth 2 times daily      PARoxetine (PAXIL) 40 MG tablet Take 40 mg by mouth every morning      Omega-3 Fatty Acids (FISH OIL PO) Take 1,000 mg by mouth 3 times daily      aspirin 81 MG EC tablet Take 81 mg by mouth daily       No current facility-administered medications for this visit. No Known Allergies    BP (!) 142/90 (Site: Right Upper Arm, Position: Sitting)   Pulse 96   Temp 97.3 °F (36.3 °C) (Temporal)   Resp 16   Wt 224 lb (101.6 kg)   SpO2 97%   BMI 34.06 kg/m²     Social History     Tobacco Use    Smoking status: Never Smoker    Smokeless tobacco: Never Used   Substance Use Topics    Alcohol use: Not Currently       Review of Systems   Constitutional: Negative for activity change, appetite change, chills, fatigue and fever. HENT: Negative for congestion, ear discharge, ear pain, postnasal drip, rhinorrhea, sinus pressure and sinus pain. Eyes: Negative for visual disturbance. Respiratory: Negative for chest tightness and shortness of breath. Cardiovascular: Negative for chest pain, palpitations and leg swelling. Gastrointestinal: Negative for constipation, diarrhea, nausea and vomiting. Genitourinary: Negative. Musculoskeletal: Negative. Skin: Negative for rash and wound. Neurological: Negative for dizziness, syncope, light-headedness and headaches. Psychiatric/Behavioral: The patient is nervous/anxious. Physical Exam  Constitutional:       General: He is not in acute distress. Appearance: Normal appearance. He is not ill-appearing. HENT:      Head: Normocephalic and atraumatic. Right Ear: External ear normal.      Left Ear: External ear normal.      Nose: Nose normal. No congestion. Mouth/Throat:      Mouth: Mucous membranes are moist.      Pharynx: Oropharynx is clear. Eyes:      Extraocular Movements: Extraocular movements intact. Cardiovascular:      Rate and Rhythm: Normal rate and regular rhythm. Heart sounds: Normal heart sounds. Pulmonary:      Breath sounds: Normal breath sounds. Abdominal:      General: Abdomen is flat.  Bowel sounds are normal. Musculoskeletal:         General: Normal range of motion. Cervical back: Normal range of motion. Skin:     General: Skin is warm and dry. Neurological:      General: No focal deficit present. Mental Status: He is alert and oriented to person, place, and time. Psychiatric:         Attention and Perception: Attention normal.         Mood and Affect: Affect is flat. Speech: Speech normal.         Behavior: Behavior normal.         Thought Content: Thought content normal.         Diagnosis       ICD-10-CM    1. Anxiety  F41.9    2. Essential hypertension  I10    3. Type 2 diabetes mellitus without complication, without long-term current use of insulin (HCC)  E11.9         Plan    Continue current medications per cardiology  Switch paxil to 20 mg in the morning and 20 mg at night  Continue Buspar  Stop hydroxyzine  Encouraged to find a mental health provider     No orders of the defined types were placed in this encounter. Orders Placed This Encounter   Medications    PARoxetine (PAXIL) 20 MG tablet     Sig: Take 1 tablet by mouth 2 times daily     Dispense:  60 tablet     Refill:  1       Patient Education:  Plan    Return in about 8 weeks (around 9/8/2021) for Mental health.

## 2021-08-04 ENCOUNTER — HOSPITAL ENCOUNTER (OUTPATIENT)
Age: 61
Discharge: HOME | End: 2021-08-04
Payer: COMMERCIAL

## 2021-08-04 DIAGNOSIS — I10 ESSENTIAL HYPERTENSION: ICD-10-CM

## 2021-08-04 LAB
ANION GAP SERPL CALCULATED.3IONS-SCNC: 15 MMOL/L (ref 3–16)
BUN BLDV-MCNC: 18 MG/DL (ref 7–20)
CALCIUM SERPL-MCNC: 9.7 MG/DL (ref 8.3–10.6)
CHLORIDE BLD-SCNC: 98 MMOL/L (ref 99–110)
CO2: 23 MMOL/L (ref 21–32)
CREAT SERPL-MCNC: 1.2 MG/DL (ref 0.8–1.3)
GFR AFRICAN AMERICAN: >60
GFR NON-AFRICAN AMERICAN: >60
GLUCOSE BLD-MCNC: 138 MG/DL (ref 70–99)
POTASSIUM SERPL-SCNC: 4.5 MMOL/L (ref 3.5–5.1)
SODIUM BLD-SCNC: 136 MMOL/L (ref 136–145)

## 2021-08-04 PROCEDURE — 80048 BASIC METABOLIC PNL TOTAL CA: CPT

## 2021-08-04 PROCEDURE — 36415 COLL VENOUS BLD VENIPUNCTURE: CPT

## 2021-08-05 ENCOUNTER — TELEPHONE (OUTPATIENT)
Dept: CARDIOLOGY CLINIC | Age: 61
End: 2021-08-05

## 2021-08-05 NOTE — TELEPHONE ENCOUNTER
----- Message from Harvie Goodpasture, MD sent at 8/4/2021  4:52 PM EDT -----  Call  Labs ok  Continue current meds

## 2021-08-05 NOTE — TELEPHONE ENCOUNTER
Spoke with a woman, no one is listed on HIPAA from. LEILA to have pt contact the office for results.

## 2021-08-06 ENCOUNTER — OFFICE VISIT (OUTPATIENT)
Dept: CARDIOLOGY CLINIC | Age: 61
End: 2021-08-06
Payer: COMMERCIAL

## 2021-08-06 VITALS
HEART RATE: 87 BPM | HEIGHT: 68 IN | OXYGEN SATURATION: 96 % | WEIGHT: 224.6 LBS | DIASTOLIC BLOOD PRESSURE: 82 MMHG | SYSTOLIC BLOOD PRESSURE: 128 MMHG | BODY MASS INDEX: 34.04 KG/M2

## 2021-08-06 DIAGNOSIS — R42 DIZZINESS: ICD-10-CM

## 2021-08-06 DIAGNOSIS — I25.2 HISTORY OF MI (MYOCARDIAL INFARCTION): ICD-10-CM

## 2021-08-06 DIAGNOSIS — R94.31 ABNORMAL EKG: ICD-10-CM

## 2021-08-06 DIAGNOSIS — I10 ESSENTIAL HYPERTENSION: Primary | ICD-10-CM

## 2021-08-06 PROCEDURE — G8427 DOCREV CUR MEDS BY ELIG CLIN: HCPCS | Performed by: INTERNAL MEDICINE

## 2021-08-06 PROCEDURE — 1036F TOBACCO NON-USER: CPT | Performed by: INTERNAL MEDICINE

## 2021-08-06 PROCEDURE — 3017F COLORECTAL CA SCREEN DOC REV: CPT | Performed by: INTERNAL MEDICINE

## 2021-08-06 PROCEDURE — 99214 OFFICE O/P EST MOD 30 MIN: CPT | Performed by: INTERNAL MEDICINE

## 2021-08-06 PROCEDURE — G8417 CALC BMI ABV UP PARAM F/U: HCPCS | Performed by: INTERNAL MEDICINE

## 2021-08-06 NOTE — PATIENT INSTRUCTIONS
Plan:  Echocardiogram and renal US   Start taking your Verapamil in the mornings for better blood pressure control   Cardiac medications reviewed including indications and pertinent side effects    Check blood pressure and heart rate at home a few times per week- keep a log with dates and times and bring to office visit   Regular exercise and following a healthy diet encouraged   Follow up with me in one month   Your provider has ordered testing for further evaluation. An order/prescription has been included in your paper work.  To schedule outpatient testing, contact Central Scheduling by calling [address] [place] [address] ([phone]).

## 2021-08-06 NOTE — LETTER
Nurys Da Silva  1960        [address]   Cardiac Consultation    Referring Provider:  JOSE RAMON Hale CNP     Chief Complaint   Patient presents with    Follow-up    Dizziness     once in a while        History of Present Illness:   Nurys Da Silva is a 59 y.o. male who is here today for follow up for hypertension. Today he states he continues to have anxiety. His blood pressure at home runs 120-150's with the higher blood pressures seen in the evenings around 5 pm. He has been having dizziness at times, no syncope. He is tolerating his medications and taking them as prescribed. Patient currently denies any weight gain, edema, palpitations, chest pain, shortness of breath,  and syncope. Past Medical History:   has a past medical history of Depression, Diabetes mellitus ([address] and Hypertension. Surgical History:   has no past surgical history on file. Social History:   reports that he has never smoked. He has never used smokeless tobacco. He reports previous alcohol use. He reports previous drug use. Family History:  family history includes Depression in his mother; Heart Attack in his father; Heart Disease in his father, maternal grandfather, maternal grandmother, mother, paternal grandfather, and paternal grandmother; High Blood Pressure in his maternal grandfather, maternal grandmother, mother, paternal grandfather, paternal grandmother, and sister; No Known Problems in his brother. Home Medications:  Prior to Admission medications    Medication Sig Start Date End Date Taking?  Authorizing Provider   PARoxetine (PAXIL) 20 MG tablet Take 1 tablet by mouth 2 times daily 7/14/21  Yes JOSE RAMON Hale CNP   valsartan-hydroCHLOROthiazide (DIOVAN-HCT) 320-25 MG per tablet Take 1 tablet by mouth daily 7/2/21  Yes Nadege Salinas MD   labetalol (NORMODYNE) 100 MG tablet Take 1 tablet by mouth 2 times daily 7/2/21  Yes Nadege Salinas MD verapamil (CALAN SR) 240 MG extended release tablet Take 1 tablet by mouth nightly 7/2/21  Yes Cal Jorgensen MD   busPIRone (BUSPAR) 10 MG tablet TAKE 1 TABLET BY MOUTH THREE TIMES A DAY 6/28/21  Yes JOSE RAMON Guerrero CNP   metFORMIN (GLUCOPHAGE) 1000 MG tablet Take 1,000 mg by mouth 2 times daily (with meals)   Yes Historical Provider, MD   atorvastatin (LIPITOR) 40 MG tablet Take 40 mg by mouth daily   Yes Historical Provider, MD   QUEtiapine (SEROQUEL) 200 MG tablet Take 200 mg by mouth 2 times daily   Yes Historical Provider, MD   Omega-3 Fatty Acids (FISH OIL PO) Take 1,000 mg by mouth 3 times daily   Yes Historical Provider, MD   aspirin 81 MG EC tablet Take 81 mg by mouth daily   Yes Historical Provider, MD        Allergies:  Patient has no known allergies. Review of Systems:   · Constitutional: there has been no unanticipated weight loss. There's been no change in energy level, sleep pattern, or activity level. · Eyes: No visual changes or diplopia. No scleral icterus. · ENT: No Headaches, hearing loss or vertigo. No mouth sores or sore throat. · Cardiovascular: Reviewed in HPI  · Respiratory: No cough or wheezing, no sputum production. No hematemesis. · Gastrointestinal: No abdominal pain, appetite loss, blood in stools. No change in bowel or bladder habits. · Genitourinary: No dysuria, trouble voiding, or hematuria. · Musculoskeletal:  No gait disturbance, weakness or joint complaints. · Integumentary: No rash or pruritis. · Neurological: No headache, diplopia, change in muscle strength, numbness or tingling. No change in gait, balance, coordination, mood, affect, memory, mentation, behavior. · Psychiatric: No anxiety, no depression. · Endocrine: No malaise, fatigue or temperature intolerance. No excessive thirst, fluid intake, or urination. No tremor.   · Hematologic/Lymphatic: No abnormal bruising or bleeding, blood clots or swollen lymph nodes.  · Allergic/Immunologic: No nasal congestion or hives. Physical Examination:    Vitals:    08/06/21 1203   BP: 128/82   Pulse: 87   SpO2: 96%        BP recheck 122/80    Constitutional and General Appearance: NAD   Respiratory:  · Normal excursion and expansion without use of accessory muscles  · Resp Auscultation: Normal breath sounds without dullness  Cardiovascular:  · The apical impulses not displaced  · Heart tones are crisp and normal  · Cervical veins are not engorged  · The carotid upstroke is normal in amplitude and contour without delay or bruit  · Normal S1S2, No S3, No Murmur  · Peripheral pulses are symmetrical and full  · There is no clubbing, cyanosis of the extremities. · No edema  · Femoral Arteries: 2+ and equal  · Pedal Pulses: 2+ and equal   Abdomen:  · No masses or tenderness  · Liver/Spleen: No Abnormalities Noted  Neurological/Psychiatric:  · Alert and oriented in all spheres  · Moves all extremities well  · Exhibits normal gait balance and coordination  · No abnormalities of mood, affect, memory, mentation, or behavior are noted    EKG 5/21/2021  Sinus  Rhythm   WITHIN NORMAL LIMITS 63 BPM    Assessment:   HTN - improved post med changes last OV. Mild elev usually in evenings. Remains suboptimal.   Anxiety - again discussed relationship to elev BP and advised relaxation techniques. Obesity - again discussed relationship to HTN  Abnormal EKG - reviewed w/ pt. Possible prior MI. No symptoms   Dizziness meds and BP may be contributing   History of MI     Plan:  Echocardiogram and renal US   Start taking your Verapamil in the mornings for better blood pressure control   Cardiac medications reviewed including indications and pertinent side effects. Will not increase meds today. Will consider if BP remains elev at next visit.      Check blood pressure and heart rate at home a few times per week- keep a log with dates and times and bring to office visit   Regular exercise and following a healthy diet encouraged   Follow up with me in one month     Scribe's attestation: This note was scribed in the presence of Dr. Toya Bosworth M.D. By Prince Sher RN    The scribes documentation has been prepared under my direction and personally reviewed by me in its entirety. I confirm that the note above accurately reflects all work, treatment, procedures, and medical decision making performed by me. Dr. Toya Bosworth, MD    Thank you for allowing me to participate in the care of this individual.      Anabella Roth.  Corey Garcia M.D., Geovanna Perrin

## 2021-08-06 NOTE — PROGRESS NOTES
[address]   Cardiac Consultation    Referring Provider:  JOSE RAMON Soni CNP     Chief Complaint   Patient presents with    Follow-up    Dizziness     once in a while        History of Present Illness:   Laura Medrano is a 59 y.o. male who is here today for follow up for hypertension. Today he states he continues to have anxiety. His blood pressure at home runs 120-150's with the higher blood pressures seen in the evenings around 5 pm. He has been having dizziness at times, no syncope. He is tolerating his medications and taking them as prescribed. Patient currently denies any weight gain, edema, palpitations, chest pain, shortness of breath,  and syncope. Past Medical History:   has a past medical history of Depression, Diabetes mellitus ([address] and Hypertension. Surgical History:   has no past surgical history on file. Social History:   reports that he has never smoked. He has never used smokeless tobacco. He reports previous alcohol use. He reports previous drug use. Family History:  family history includes Depression in his mother; Heart Attack in his father; Heart Disease in his father, maternal grandfather, maternal grandmother, mother, paternal grandfather, and paternal grandmother; High Blood Pressure in his maternal grandfather, maternal grandmother, mother, paternal grandfather, paternal grandmother, and sister; No Known Problems in his brother. Home Medications:  Prior to Admission medications    Medication Sig Start Date End Date Taking?  Authorizing Provider   PARoxetine (PAXIL) 20 MG tablet Take 1 tablet by mouth 2 times daily 7/14/21  Yes JOSE RAMON Soni CNP   valsartan-hydroCHLOROthiazide (DIOVAN-HCT) 320-25 MG per tablet Take 1 tablet by mouth daily 7/2/21  Yes Jackie Durand MD   labetalol (NORMODYNE) 100 MG tablet Take 1 tablet by mouth 2 times daily 7/2/21  Yes Jackie Durand MD   verapamil (CALAN SR) 240 MG extended release tablet Take 1 tablet by mouth nightly 7/2/21  Yes Arnold Solis MD   busPIRone (BUSPAR) 10 MG tablet TAKE 1 TABLET BY MOUTH THREE TIMES A DAY 6/28/21  Yes JOSE RAMON Baltazar CNP   metFORMIN (GLUCOPHAGE) 1000 MG tablet Take 1,000 mg by mouth 2 times daily (with meals)   Yes Historical Provider, MD   atorvastatin (LIPITOR) 40 MG tablet Take 40 mg by mouth daily   Yes Historical Provider, MD   QUEtiapine (SEROQUEL) 200 MG tablet Take 200 mg by mouth 2 times daily   Yes Historical Provider, MD   Omega-3 Fatty Acids (FISH OIL PO) Take 1,000 mg by mouth 3 times daily   Yes Historical Provider, MD   aspirin 81 MG EC tablet Take 81 mg by mouth daily   Yes Historical Provider, MD        Allergies:  Patient has no known allergies. Review of Systems:   · Constitutional: there has been no unanticipated weight loss. There's been no change in energy level, sleep pattern, or activity level. · Eyes: No visual changes or diplopia. No scleral icterus. · ENT: No Headaches, hearing loss or vertigo. No mouth sores or sore throat. · Cardiovascular: Reviewed in HPI  · Respiratory: No cough or wheezing, no sputum production. No hematemesis. · Gastrointestinal: No abdominal pain, appetite loss, blood in stools. No change in bowel or bladder habits. · Genitourinary: No dysuria, trouble voiding, or hematuria. · Musculoskeletal:  No gait disturbance, weakness or joint complaints. · Integumentary: No rash or pruritis. · Neurological: No headache, diplopia, change in muscle strength, numbness or tingling. No change in gait, balance, coordination, mood, affect, memory, mentation, behavior. · Psychiatric: No anxiety, no depression. · Endocrine: No malaise, fatigue or temperature intolerance. No excessive thirst, fluid intake, or urination. No tremor. · Hematologic/Lymphatic: No abnormal bruising or bleeding, blood clots or swollen lymph nodes. · Allergic/Immunologic: No nasal congestion or hives.     Physical Examination:    Vitals:    08/06/21 1203   BP: 128/82   Pulse: 87   SpO2: 96%        BP recheck 122/80    Constitutional and General Appearance: NAD   Respiratory:  · Normal excursion and expansion without use of accessory muscles  · Resp Auscultation: Normal breath sounds without dullness  Cardiovascular:  · The apical impulses not displaced  · Heart tones are crisp and normal  · Cervical veins are not engorged  · The carotid upstroke is normal in amplitude and contour without delay or bruit  · Normal S1S2, No S3, No Murmur  · Peripheral pulses are symmetrical and full  · There is no clubbing, cyanosis of the extremities. · No edema  · Femoral Arteries: 2+ and equal  · Pedal Pulses: 2+ and equal   Abdomen:  · No masses or tenderness  · Liver/Spleen: No Abnormalities Noted  Neurological/Psychiatric:  · Alert and oriented in all spheres  · Moves all extremities well  · Exhibits normal gait balance and coordination  · No abnormalities of mood, affect, memory, mentation, or behavior are noted    EKG 5/21/2021  Sinus  Rhythm   WITHIN NORMAL LIMITS 63 BPM    Assessment:   HTN - improved post med changes last OV. Mild elev usually in evenings. Remains suboptimal.   Anxiety - again discussed relationship to elev BP and advised relaxation techniques. Obesity - again discussed relationship to HTN  Abnormal EKG - reviewed w/ pt. Possible prior MI. No symptoms   Dizziness meds and BP may be contributing   History of MI     Plan:  Echocardiogram and renal US   Start taking your Verapamil in the mornings for better blood pressure control   Cardiac medications reviewed including indications and pertinent side effects. Will not increase meds today. Will consider if BP remains elev at next visit.      Check blood pressure and heart rate at home a few times per week- keep a log with dates and times and bring to office visit   Regular exercise and following a healthy diet encouraged   Follow up with me in one month     Screline's attestation: This note was scribed in the presence of Dr. Varun Pack M.D. By Lala Olson RN    The scribes documentation has been prepared under my direction and personally reviewed by me in its entirety. I confirm that the note above accurately reflects all work, treatment, procedures, and medical decision making performed by me. Dr. Varun Pack MD    Thank you for allowing me to participate in the care of this individual.      Larene Cockayne.  Jodee Wadsworth M.D., [facility]

## 2021-08-31 ENCOUNTER — TELEPHONE (OUTPATIENT)
Dept: PULMONOLOGY | Age: 61
End: 2021-08-31

## 2021-08-31 NOTE — TELEPHONE ENCOUNTER
Appointment canceled for Sixto Pike (<[id number]>)   Visit Type: NEW PATIENT   Date        Time      Length    Provider                  Department   9/16/2021    8:45 AM  15 mins. Fausto Mendosa MD               [facility]      Reason for Cancellation: Patient preference

## 2021-09-10 RX ORDER — PAROXETINE HYDROCHLORIDE 20 MG/1
TABLET, FILM COATED ORAL
Qty: 60 TABLET | Refills: 1 | Status: SHIPPED | OUTPATIENT
Start: 2021-09-10 | End: 2021-11-10

## 2021-09-10 NOTE — TELEPHONE ENCOUNTER
Future Appointments   Date Time Provider Boone Marroquin   9/17/2021 10:15 AM Sisi Love MD [facility]     7/14/2021

## 2021-09-17 ENCOUNTER — OFFICE VISIT (OUTPATIENT)
Dept: CARDIOLOGY CLINIC | Age: 61
End: 2021-09-17
Payer: COMMERCIAL

## 2021-09-17 VITALS
SYSTOLIC BLOOD PRESSURE: 110 MMHG | HEIGHT: 68 IN | HEART RATE: 88 BPM | DIASTOLIC BLOOD PRESSURE: 60 MMHG | BODY MASS INDEX: 34.92 KG/M2 | WEIGHT: 230.4 LBS | OXYGEN SATURATION: 95 %

## 2021-09-17 DIAGNOSIS — G47.30 SLEEP APNEA, UNSPECIFIED TYPE: Primary | ICD-10-CM

## 2021-09-17 DIAGNOSIS — R53.82 CHRONIC FATIGUE: ICD-10-CM

## 2021-09-17 DIAGNOSIS — I10 ESSENTIAL HYPERTENSION: ICD-10-CM

## 2021-09-17 PROCEDURE — 3017F COLORECTAL CA SCREEN DOC REV: CPT | Performed by: INTERNAL MEDICINE

## 2021-09-17 PROCEDURE — 99214 OFFICE O/P EST MOD 30 MIN: CPT | Performed by: INTERNAL MEDICINE

## 2021-09-17 PROCEDURE — G8417 CALC BMI ABV UP PARAM F/U: HCPCS | Performed by: INTERNAL MEDICINE

## 2021-09-17 PROCEDURE — G8427 DOCREV CUR MEDS BY ELIG CLIN: HCPCS | Performed by: INTERNAL MEDICINE

## 2021-09-17 PROCEDURE — 1036F TOBACCO NON-USER: CPT | Performed by: INTERNAL MEDICINE

## 2021-09-17 NOTE — LETTER
[address]  [address] [facility]  Phone: [phone]  Fax: [phone]    Cal Jorgensen MD    September 20, 2021     Yoel Colorado, APRN - CNP  [address] [place] [address]    Patient: Frederic Ear   MR Number: <[id number]>   YOB: 1960   Date of Visit: 9/17/2021       Dear Yoel Colorado: Thank you for referring Nava Soriano to me for evaluation/treatment. Below are the relevant portions of my assessment and plan of care. If you have questions, please do not hesitate to call me. I look forward to following Vincent Mendes along with you.     Sincerely,      Cal Jorgensen MD

## 2021-09-17 NOTE — PROGRESS NOTES
[address]   Cardiac Consultation    Referring Provider:  JOSE RAMON Yoon CNP     Chief Complaint   Patient presents with    Follow-up     4 weeks. Pt reports that he still feels \"nervousness\" since last [place]   1960    History of Present Illness:   Teresa Santo is a 59 y.o. male who is here today for follow up for hypertension, dizziness, remote MI, and anxiety. At his last visit an echocardiogram and renal US were ordered but not yet completed. He ws instructed to take Verapamil in the mornings. Today he states he has been having fatigue and nervousness since his last visit. He has not been driving due to his symptoms. He has never been tested for sleep apnea. He snores. He is monitoring his blood pressure at home and it seems controlled in the mornings bit is running 140-150's by afternoon. C/O chronic fatigue. Patient currently denies any weight gain, edema, palpitations, chest pain, shortness of breath, dizziness and syncope. Past Medical History:   has a past medical history of Depression, Diabetes mellitus ([address] and Hypertension. Surgical History:   has no past surgical history on file. Social History:   reports that he has never smoked. He has never used smokeless tobacco. He reports previous alcohol use. He reports previous drug use. Family History:  family history includes Depression in his mother; Heart Attack in his father; Heart Disease in his father, maternal grandfather, maternal grandmother, mother, paternal grandfather, and paternal grandmother; High Blood Pressure in his maternal grandfather, maternal grandmother, mother, paternal grandfather, paternal grandmother, and sister; No Known Problems in his brother. Home Medications:  Prior to Admission medications    Medication Sig Start Date End Date Taking?  Authorizing Provider   PARoxetine (PAXIL) 20 MG tablet TAKE 1 TABLET BY MOUTH TWICE A DAY 9/10/21  Yes Tabitha Gutiérrez JOSE RAMON Sena - CNP   valsartan-hydroCHLOROthiazide (DIOVAN-HCT) 320-25 MG per tablet Take 1 tablet by mouth daily 7/2/21  Yes Michele Lockett MD   labetalol (NORMODYNE) 100 MG tablet Take 1 tablet by mouth 2 times daily 7/2/21  Yes Michele Lockett MD   verapamil (CALAN SR) 240 MG extended release tablet Take 1 tablet by mouth nightly 7/2/21  Yes Michele Lockett MD   busPIRone (BUSPAR) 10 MG tablet TAKE 1 TABLET BY MOUTH THREE TIMES A DAY 6/28/21  Yes JOSE RAMON Fuller - CNP   metFORMIN (GLUCOPHAGE) 1000 MG tablet Take 1,000 mg by mouth 2 times daily (with meals)   Yes Historical Provider, MD   atorvastatin (LIPITOR) 40 MG tablet Take 40 mg by mouth daily   Yes Historical Provider, MD   QUEtiapine (SEROQUEL) 200 MG tablet Take 200 mg by mouth 2 times daily   Yes Historical Provider, MD   Omega-3 Fatty Acids (FISH OIL PO) Take 1,000 mg by mouth 3 times daily   Yes Historical Provider, MD   aspirin 81 MG EC tablet Take 81 mg by mouth daily   Yes Historical Provider, MD        Allergies:  Patient has no known allergies. Review of Systems:   · Constitutional: there has been no unanticipated weight loss. There's been no change in energy level, sleep pattern, or activity level. · Eyes: No visual changes or diplopia. No scleral icterus. · ENT: No Headaches, hearing loss or vertigo. No mouth sores or sore throat. · Cardiovascular: Reviewed in HPI  · Respiratory: No cough or wheezing, no sputum production. No hematemesis. · Gastrointestinal: No abdominal pain, appetite loss, blood in stools. No change in bowel or bladder habits. · Genitourinary: No dysuria, trouble voiding, or hematuria. · Musculoskeletal:  No gait disturbance, weakness or joint complaints. · Integumentary: No rash or pruritis. · Neurological: No headache, diplopia, change in muscle strength, numbness or tingling. No change in gait, balance, coordination, mood, affect, memory, mentation, behavior.   · Psychiatric: No anxiety, no depression. · Endocrine: No malaise, fatigue or temperature intolerance. No excessive thirst, fluid intake, or urination. No tremor. · Hematologic/Lymphatic: No abnormal bruising or bleeding, blood clots or swollen lymph nodes. · Allergic/Immunologic: No nasal congestion or hives. Physical Examination:    Vitals:    09/17/21 1023   BP: 110/60   Pulse: 88   SpO2: 95%        BP recheck 122/80    Constitutional and General Appearance: NAD   Respiratory:  · Normal excursion and expansion without use of accessory muscles  · Resp Auscultation: Normal breath sounds without dullness  Cardiovascular:  · The apical impulses not displaced  · Heart tones are crisp and normal  · Cervical veins are not engorged  · The carotid upstroke is normal in amplitude and contour without delay or bruit  · Normal S1S2, No S3, No Murmur  · Peripheral pulses are symmetrical and full  · There is no clubbing, cyanosis of the extremities. · No edema  · Femoral Arteries: 2+ and equal  · Pedal Pulses: 2+ and equal   Abdomen:  · No masses or tenderness  · Liver/Spleen: No Abnormalities Noted  Neurological/Psychiatric:  · Alert and oriented in all spheres  · Moves all extremities well  · Exhibits normal gait balance and coordination  · No abnormalities of mood, affect, memory, mentation, or behavior are noted    EKG 5/21/2021  Sinus  Rhythm   WITHIN NORMAL LIMITS 63 BPM    Assessment:   HTN - improved post med changes last OV. Mild elev usually in evenings. Reports side effects due to meds. Will continue current tx. Monitor BP. Suspect would improve w/ tx of CONNOR. Anxiety - again discussed relationship to elev BP and advised relaxation techniques.    Obesity - again discussed relationship to HTN  Abnormal EKG  Dizziness meds and BP may be contributing   History of MI   Fatigue - suspect sleep apnea     Plan:  Echocardiogram and renal US (did not do as advised last OV) - call central scheduling at [phone] to schedule testing at this point the testing will go through the prior authorization process   Recommend a sleep study   Cardiac medications reviewed including indications and pertinent side effects. No changes. Refills as needed. Check blood pressure and heart rate at home a few times per week- keep a log with dates and times and bring to office visit   Regular exercise and following a healthy diet encouraged   Follow up with me in 3 months     Scribe's attestation: This note was scribed in the presence of Dr. Paulino Santana M.D. By Miguel Dill RN    The scribes documentation has been prepared under my direction and personally reviewed by me in its entirety. I confirm that the note above accurately reflects all work, treatment, procedures, and medical decision making performed by me. Dr. Paulino Santana MD    Thank you for allowing me to participate in the care of this individual.      Maura Ojeda.  Bismark Castellon M.D., [facility]

## 2021-09-17 NOTE — PATIENT INSTRUCTIONS
Plan:  Echocardiogram and renal US- call central scheduling at [phone] to schedule testing at this point the testing will go through the prior authorization process   Recommend a sleep study   Cardiac medications reviewed including indications and pertinent side effects    Check blood pressure and heart rate at home a few times per week- keep a log with dates and times and bring to office visit   Regular exercise and following a healthy diet encouraged   Follow up with me in 3 months

## 2021-09-27 RX ORDER — BUSPIRONE HYDROCHLORIDE 10 MG/1
TABLET ORAL
Qty: 90 TABLET | Refills: 2 | Status: SHIPPED | OUTPATIENT
Start: 2021-09-27 | End: 2021-12-17

## 2021-10-15 ENCOUNTER — HOSPITAL ENCOUNTER (OUTPATIENT)
Dept: NON INVASIVE DIAGNOSTICS | Age: 61
Discharge: HOME | End: 2021-10-15
Payer: COMMERCIAL

## 2021-10-15 DIAGNOSIS — I10 ESSENTIAL HYPERTENSION: ICD-10-CM

## 2021-10-15 DIAGNOSIS — F41.9 ANXIETY: ICD-10-CM

## 2021-10-15 LAB
LV EF: 58 %
LVEF MODALITY: NORMAL

## 2021-10-15 PROCEDURE — 6360000004 HC RX CONTRAST MEDICATION: Performed by: INTERNAL MEDICINE

## 2021-10-15 PROCEDURE — C8929 TTE W OR WO FOL WCON,DOPPLER: HCPCS

## 2021-10-15 RX ADMIN — PERFLUTREN 2.2 MG: 6.52 INJECTION, SUSPENSION INTRAVENOUS at 12:37

## 2021-10-18 ENCOUNTER — TELEPHONE (OUTPATIENT)
Dept: CARDIOLOGY CLINIC | Age: 61
End: 2021-10-18

## 2021-10-18 NOTE — TELEPHONE ENCOUNTER
----- Message from Epifania Dancer, MD sent at 10/18/2021  8:53 AM EDT -----  Call  Echo ok  Normal heart fxn

## 2021-11-10 RX ORDER — PAROXETINE HYDROCHLORIDE 20 MG/1
TABLET, FILM COATED ORAL
Qty: 60 TABLET | Refills: 1 | Status: SHIPPED | OUTPATIENT
Start: 2021-11-10 | End: 2022-01-05

## 2021-11-10 NOTE — TELEPHONE ENCOUNTER
Future Appointments   Date Time Provider Boone Marroquin   12/29/2021 11:00 AM JOSE RAMON Navas - CNP [facility]     7/14/2021

## 2021-12-17 RX ORDER — BUSPIRONE HYDROCHLORIDE 10 MG/1
TABLET ORAL
Qty: 90 TABLET | Refills: 2 | Status: SHIPPED | OUTPATIENT
Start: 2021-12-17 | End: 2022-01-26 | Stop reason: SDUPTHER

## 2021-12-17 NOTE — TELEPHONE ENCOUNTER
LOV 7/14/2021    Future Appointments   Date Time Provider Boone Marroquin   12/29/2021 11:00 AM JOSE RAMON Watkins - CNP [facility]

## 2022-01-04 NOTE — TELEPHONE ENCOUNTER
7/14/2021    Future Appointments   Date Time Provider Boone Marroquin   3/1/2022  1:00 PM JOSE RAMON Turner - CNP [facility]

## 2022-01-05 RX ORDER — PAROXETINE HYDROCHLORIDE 20 MG/1
TABLET, FILM COATED ORAL
Qty: 60 TABLET | Refills: 0 | Status: SHIPPED | OUTPATIENT
Start: 2022-01-05 | End: 2022-01-26 | Stop reason: ALTCHOICE

## 2022-01-05 NOTE — TELEPHONE ENCOUNTER
Future Appointments   Date Time Provider Boone Marroquin   1/26/2022 11:00 AM Vinita Drone, APRN - CNP [facility]   3/1/2022  1:00 PM JOSE RAMON Hughes CNP [facility]

## 2022-01-26 ENCOUNTER — OFFICE VISIT (OUTPATIENT)
Dept: FAMILY MEDICINE CLINIC | Age: 62
End: 2022-01-26
Payer: COMMERCIAL

## 2022-01-26 VITALS
SYSTOLIC BLOOD PRESSURE: 134 MMHG | DIASTOLIC BLOOD PRESSURE: 86 MMHG | RESPIRATION RATE: 18 BRPM | HEART RATE: 82 BPM | WEIGHT: 238 LBS | BODY MASS INDEX: 36.19 KG/M2 | OXYGEN SATURATION: 96 %

## 2022-01-26 DIAGNOSIS — I10 ESSENTIAL HYPERTENSION: ICD-10-CM

## 2022-01-26 DIAGNOSIS — E11.9 TYPE 2 DIABETES MELLITUS WITHOUT COMPLICATION, WITHOUT LONG-TERM CURRENT USE OF INSULIN (HCC): Primary | ICD-10-CM

## 2022-01-26 DIAGNOSIS — Z12.5 SCREENING FOR PROSTATE CANCER: ICD-10-CM

## 2022-01-26 DIAGNOSIS — F41.9 ANXIETY: ICD-10-CM

## 2022-01-26 DIAGNOSIS — E11.9 TYPE 2 DIABETES MELLITUS WITHOUT COMPLICATION, WITHOUT LONG-TERM CURRENT USE OF INSULIN (HCC): ICD-10-CM

## 2022-01-26 DIAGNOSIS — Z12.11 SCREEN FOR COLON CANCER: ICD-10-CM

## 2022-01-26 LAB
A/G RATIO: 1.8 (ref 1.1–2.2)
ALBUMIN SERPL-MCNC: 4.5 G/DL (ref 3.4–5)
ALP BLD-CCNC: 136 U/L (ref 40–129)
ALT SERPL-CCNC: 41 U/L (ref 10–40)
ANION GAP SERPL CALCULATED.3IONS-SCNC: 18 MMOL/L (ref 3–16)
AST SERPL-CCNC: 37 U/L (ref 15–37)
BASOPHILS ABSOLUTE: 0.1 K/UL (ref 0–0.2)
BASOPHILS RELATIVE PERCENT: 0.9 %
BILIRUB SERPL-MCNC: 0.6 MG/DL (ref 0–1)
BUN BLDV-MCNC: 20 MG/DL (ref 7–20)
CALCIUM SERPL-MCNC: 9.4 MG/DL (ref 8.3–10.6)
CHLORIDE BLD-SCNC: 99 MMOL/L (ref 99–110)
CHOLESTEROL, TOTAL: 160 MG/DL (ref 0–199)
CO2: 21 MMOL/L (ref 21–32)
CREAT SERPL-MCNC: 1.3 MG/DL (ref 0.8–1.3)
EOSINOPHILS ABSOLUTE: 0.8 K/UL (ref 0–0.6)
EOSINOPHILS RELATIVE PERCENT: 6.8 %
GFR AFRICAN AMERICAN: >60
GFR NON-AFRICAN AMERICAN: 56
GLUCOSE BLD-MCNC: 147 MG/DL (ref 70–99)
HBA1C MFR BLD: 6.7 %
HCT VFR BLD CALC: 43.5 % (ref 40.5–52.5)
HDLC SERPL-MCNC: 32 MG/DL (ref 40–60)
HEMOGLOBIN: 14.5 G/DL (ref 13.5–17.5)
LDL CHOLESTEROL CALCULATED: 96 MG/DL
LYMPHOCYTES ABSOLUTE: 2.2 K/UL (ref 1–5.1)
LYMPHOCYTES RELATIVE PERCENT: 19.4 %
MCH RBC QN AUTO: 26.1 PG (ref 26–34)
MCHC RBC AUTO-ENTMCNC: 33.2 G/DL (ref 31–36)
MCV RBC AUTO: 78.7 FL (ref 80–100)
MONOCYTES ABSOLUTE: 0.6 K/UL (ref 0–1.3)
MONOCYTES RELATIVE PERCENT: 5.6 %
NEUTROPHILS ABSOLUTE: 7.6 K/UL (ref 1.7–7.7)
NEUTROPHILS RELATIVE PERCENT: 67.3 %
PDW BLD-RTO: 15.7 % (ref 12.4–15.4)
PLATELET # BLD: 112 K/UL (ref 135–450)
PLATELET SLIDE REVIEW: ABNORMAL
PMV BLD AUTO: 9.9 FL (ref 5–10.5)
POTASSIUM SERPL-SCNC: 4.5 MMOL/L (ref 3.5–5.1)
PROSTATE SPECIFIC ANTIGEN: 0.52 NG/ML (ref 0–4)
RBC # BLD: 5.53 M/UL (ref 4.2–5.9)
SLIDE REVIEW: ABNORMAL
SODIUM BLD-SCNC: 138 MMOL/L (ref 136–145)
TOTAL PROTEIN: 7 G/DL (ref 6.4–8.2)
TRIGL SERPL-MCNC: 161 MG/DL (ref 0–150)
VLDLC SERPL CALC-MCNC: 32 MG/DL
WBC # BLD: 11.3 K/UL (ref 4–11)

## 2022-01-26 PROCEDURE — 2022F DILAT RTA XM EVC RTNOPTHY: CPT | Performed by: NURSE PRACTITIONER

## 2022-01-26 PROCEDURE — 99214 OFFICE O/P EST MOD 30 MIN: CPT | Performed by: NURSE PRACTITIONER

## 2022-01-26 PROCEDURE — 83036 HEMOGLOBIN GLYCOSYLATED A1C: CPT | Performed by: NURSE PRACTITIONER

## 2022-01-26 PROCEDURE — G8417 CALC BMI ABV UP PARAM F/U: HCPCS | Performed by: NURSE PRACTITIONER

## 2022-01-26 PROCEDURE — 82044 UR ALBUMIN SEMIQUANTITATIVE: CPT | Performed by: NURSE PRACTITIONER

## 2022-01-26 PROCEDURE — 3044F HG A1C LEVEL LT 7.0%: CPT | Performed by: NURSE PRACTITIONER

## 2022-01-26 PROCEDURE — 1036F TOBACCO NON-USER: CPT | Performed by: NURSE PRACTITIONER

## 2022-01-26 PROCEDURE — G8484 FLU IMMUNIZE NO ADMIN: HCPCS | Performed by: NURSE PRACTITIONER

## 2022-01-26 PROCEDURE — G8427 DOCREV CUR MEDS BY ELIG CLIN: HCPCS | Performed by: NURSE PRACTITIONER

## 2022-01-26 PROCEDURE — 3017F COLORECTAL CA SCREEN DOC REV: CPT | Performed by: NURSE PRACTITIONER

## 2022-01-26 RX ORDER — QUETIAPINE FUMARATE 200 MG/1
200 TABLET, FILM COATED ORAL 2 TIMES DAILY
Qty: 180 TABLET | Refills: 0 | Status: SHIPPED | OUTPATIENT
Start: 2022-01-26 | End: 2022-01-26 | Stop reason: DRUGHIGH

## 2022-01-26 RX ORDER — METFORMIN HYDROCHLORIDE 500 MG/1
500 TABLET, EXTENDED RELEASE ORAL
Qty: 30 TABLET | Refills: 2 | Status: SHIPPED | OUTPATIENT
Start: 2022-01-26 | End: 2022-04-19

## 2022-01-26 RX ORDER — QUETIAPINE FUMARATE 50 MG/1
50 TABLET, FILM COATED ORAL 2 TIMES DAILY
Qty: 180 TABLET | Refills: 0 | Status: SHIPPED | OUTPATIENT
Start: 2022-01-26 | End: 2022-07-14 | Stop reason: SDUPTHER

## 2022-01-26 RX ORDER — BUSPIRONE HYDROCHLORIDE 10 MG/1
TABLET ORAL
Qty: 90 TABLET | Refills: 2 | Status: SHIPPED | OUTPATIENT
Start: 2022-01-26 | End: 2022-03-11 | Stop reason: SDUPTHER

## 2022-01-26 RX ORDER — ATORVASTATIN CALCIUM 40 MG/1
40 TABLET, FILM COATED ORAL DAILY
Qty: 90 TABLET | Refills: 1 | Status: SHIPPED | OUTPATIENT
Start: 2022-01-26 | End: 2022-08-26

## 2022-01-26 RX ORDER — VENLAFAXINE HYDROCHLORIDE 75 MG/1
75 CAPSULE, EXTENDED RELEASE ORAL DAILY
Qty: 30 CAPSULE | Refills: 3 | Status: SHIPPED | OUTPATIENT
Start: 2022-01-26 | End: 2022-03-23 | Stop reason: SDUPTHER

## 2022-01-26 ASSESSMENT — ENCOUNTER SYMPTOMS
ABDOMINAL PAIN: 0
COUGH: 0
EYE ITCHING: 0
SORE THROAT: 0
CHEST TIGHTNESS: 0
SINUS PRESSURE: 0
DIARRHEA: 0
NAUSEA: 0
WHEEZING: 0
TROUBLE SWALLOWING: 0
EYE REDNESS: 0
SHORTNESS OF BREATH: 0
COLOR CHANGE: 0
CONSTIPATION: 0

## 2022-01-26 NOTE — PROGRESS NOTES
1/26/2022    This is a 59 y.o. male   Chief Complaint   Patient presents with    Anxiety     not getting better. states not getting worse either. still very fatigued.  Hypertension     states BP is good    Diabetes     not checked sugars   . Paty Turner is seen today for follow up on htn, anxiety, and diabetes    Hypertension: Patient here for follow-up of elevated blood pressure. He is not exercising and is not adherent to low salt diet. Blood pressure is not measured at home. He is overdue for follow up with cardiology. Cardiac symptoms none. Patient denies chest pain, chest pressure/discomfort, dyspnea, exertional chest pressure/discomfort, palpitations, paroxysmal nocturnal dyspnea, syncope and tachypnea. Cardiovascular risk factors: advanced age (older than 54 for men, 72 for women), diabetes mellitus, dyslipidemia, hypertension, male gender, obesity (BMI >= 30 kg/m2) and sedentary lifestyle. Use of agents associated with hypertension: none. History of target organ damage: MI    Diabetes: he tries to eat a balanced diet. He has not been on metformin in over a year and half. Denies symptoms of hyper and hypoglycemia. Eye exam is overdue. He denies any issues with his feel    Anxiety: he is not doing well with his current medicaitons. He feels very anxious, has trouble sleeping through the night. He is taking his medication consistently, but does not feel that it is helping. He notes decreased motivation and worsening fatigue. No voiced suicidal ideation.  He states he has not been able to get into psychiatry because he has not been able to get a provider that accepts his insurance and is taking new patients       Patient Active Problem List   Diagnosis    Depression    Diabetes mellitus ([facility] [address]    Hypertension    Anxiety    Dizziness    Abnormal EKG    History of MI (myocardial infarction)    Chronic fatigue       Current Outpatient Medications   Medication Sig Dispense Refill    PARoxetine (PAXIL) 20 MG tablet TAKE 1 TABLET BY MOUTH TWICE A DAY 60 tablet 0    busPIRone (BUSPAR) 10 MG tablet TAKE 1 TABLET BY MOUTH THREE TIMES A DAY 90 tablet 2    valsartan-hydroCHLOROthiazide (DIOVAN-HCT) 320-25 MG per tablet Take 1 tablet by mouth daily 90 tablet 3    labetalol (NORMODYNE) 100 MG tablet Take 1 tablet by mouth 2 times daily 180 tablet 3    verapamil (CALAN SR) 240 MG extended release tablet Take 1 tablet by mouth nightly 90 tablet 3    metFORMIN (GLUCOPHAGE) 1000 MG tablet Take 1,000 mg by mouth 2 times daily (with meals)      atorvastatin (LIPITOR) 40 MG tablet Take 40 mg by mouth daily      QUEtiapine (SEROQUEL) 200 MG tablet Take 200 mg by mouth 2 times daily      Omega-3 Fatty Acids (FISH OIL PO) Take 1,000 mg by mouth 3 times daily      aspirin 81 MG EC tablet Take 81 mg by mouth daily       No current facility-administered medications for this visit. No Known Allergies    There were no vitals taken for this visit. Social History     Tobacco Use    Smoking status: Never Smoker    Smokeless tobacco: Never Used   Substance Use Topics    Alcohol use: Not Currently       Review of Systems   Constitutional: Positive for activity change and fatigue. Negative for chills, fever and unexpected weight change. HENT: Negative for ear discharge, mouth sores, postnasal drip, sinus pressure, sore throat and trouble swallowing. Eyes: Negative for redness, itching and visual disturbance. Respiratory: Negative for cough, chest tightness, shortness of breath and wheezing. Cardiovascular: Negative for chest pain, palpitations and leg swelling. Gastrointestinal: Negative for abdominal pain, constipation, diarrhea and nausea. Endocrine: Negative for cold intolerance, heat intolerance, polydipsia, polyphagia and polyuria. Genitourinary: Negative for dysuria, frequency and urgency. Musculoskeletal: Negative for arthralgias, joint swelling and myalgias.    Skin: Negative for color change, pallor and rash. Allergic/Immunologic: Negative for environmental allergies, food allergies and immunocompromised state. Neurological: Negative for dizziness, syncope, weakness and headaches. Hematological: Does not bruise/bleed easily. Psychiatric/Behavioral: Positive for dysphoric mood and sleep disturbance. Negative for agitation, behavioral problems and hallucinations. The patient is nervous/anxious. Physical Exam  Vitals and nursing note reviewed. Constitutional:       General: He is not in acute distress. Appearance: Normal appearance. He is obese. He is ill-appearing. HENT:      Head: Normocephalic and atraumatic. Right Ear: Tympanic membrane, ear canal and external ear normal.      Left Ear: Tympanic membrane, ear canal and external ear normal.      Nose: Nose normal. No congestion or rhinorrhea. Mouth/Throat:      Mouth: Mucous membranes are moist.      Pharynx: Oropharynx is clear. No posterior oropharyngeal erythema. Eyes:      General:         Right eye: No discharge. Left eye: No discharge. Conjunctiva/sclera: Conjunctivae normal.   Neck:      Trachea: Phonation normal.   Cardiovascular:      Rate and Rhythm: Normal rate and regular rhythm. Pulmonary:      Effort: Pulmonary effort is normal. No respiratory distress. Breath sounds: Normal breath sounds. Abdominal:      Palpations: Abdomen is soft. Musculoskeletal:      Cervical back: Normal range of motion. Skin:     Coloration: Skin is not jaundiced or pale. Neurological:      General: No focal deficit present. Mental Status: He is alert and oriented to person, place, and time. Psychiatric:         Attention and Perception: Perception normal.         Mood and Affect: Mood is anxious. Affect is flat. Affect is not tearful. Speech: Speech normal. Speech is not rapid and pressured. Behavior: Behavior normal. Behavior is cooperative.          Thought Content: Thought content normal.         Cognition and Memory: Cognition normal.         Judgment: Judgment normal. Judgment is not impulsive. Diagnosis       ICD-10-CM    1. Type 2 diabetes mellitus without complication, without long-term current use of insulin (HCC)  E11.9 POCT glycosylated hemoglobin (Hb A1C)     POCT microalbumin     COMPREHENSIVE METABOLIC PANEL     CBC Auto Differential     LIPID PANEL   2. Screen for colon cancer  Z12.11 Fecal DNA Colorectal cancer screening (Cologuard)   3. Screening for prostate cancer  Z12.5 PSA screening        Plan    Check labs  Schedule with cardiology  Colon cancer screening discussed. Will send cologuard  Restart metformin   Switch paxil to effexor  Discussed transition between the medications  Pt states he has been taking the seroquel,but I have never ordered this medication.    If he is off would consider 50 mg bid if he truly was not on medication    Orders Placed This Encounter   Procedures    Fecal DNA Colorectal cancer screening (Cologuard)    COMPREHENSIVE METABOLIC PANEL     Standing Status:   Future     Number of Occurrences:   1     Standing Expiration Date:   1/26/2023    CBC Auto Differential     Standing Status:   Future     Number of Occurrences:   1     Standing Expiration Date:   1/26/2023    PSA screening     Standing Status:   Future     Number of Occurrences:   1     Standing Expiration Date:   4/26/2022    LIPID PANEL     Standing Status:   Future     Number of Occurrences:   1     Standing Expiration Date:   1/26/2023     Order Specific Question:   Is Patient Fasting?/# of Hours     Answer:   yes    POCT glycosylated hemoglobin (Hb A1C)    POCT microalbumin       Orders Placed This Encounter   Medications    venlafaxine (EFFEXOR XR) 75 MG extended release capsule     Sig: Take 1 capsule by mouth daily     Dispense:  30 capsule     Refill:  3    busPIRone (BUSPAR) 10 MG tablet     Sig: TAKE 1 TABLET BY MOUTH THREE TIMES A DAY     Dispense:

## 2022-01-26 NOTE — PATIENT INSTRUCTIONS
Take 1 tablet of paxil daily for 1 week then stop. Start effexor in 1 week  Start metformin once daily with breakfast  Continue other medications  Schedule with cardiology    Patient Education        venlafaxine  Pronunciation:  LELE la fax een  Brand:  Effexor XR  What is the most important information I should know about venlafaxine? Some young people have thoughts about suicide when first taking an antidepressant. Stay alert to changes in your mood or symptoms. Report any new or worsening symptoms to your doctor. What is venlafaxine? Venlafaxine is a serotonin and norepinephrine reuptake inhibitor (SNRI) antidepressant. Venlafaxine is used in adults to treat major depressive disorder, anxiety, and panic disorder. Venlafaxine may also be used for purposes not listed in this medication guide. What should I discuss with my healthcare provider before taking venlafaxine? You should not take this medicine if you are allergic to venlafaxine or desvenlafaxine (Pristiq), or if you have uncontrolled narrow-angle glaucoma. Do not use venlafaxine within 7 days before or 14 days after you have used an MAO inhibitor, such as isocarboxazid, linezolid, methylene blue injection, phenelzine, or tranylcypromine. A dangerous drug interaction could occur. Tell your doctor if you also take stimulant medicine, opioid medicine, herbal products, or medicine for depression, mental illness, Parkinson's disease, migraine headaches, serious infections, or prevention of nausea and vomiting. An interaction with venlafaxine could cause a serious condition called serotonin syndrome. Tell your doctor if you have ever had:  · bipolar disorder (manic depression);  · liver disease;  · kidney disease;  · heart disease, high blood pressure, high cholesterol;  · diabetes;  · glaucoma;  · a thyroid disorder;  · a seizure;  · bleeding problems; or  · low blood levels of sodium.   Some young people have thoughts about suicide when first taking an antidepressant. Your doctor should check your progress at regular visits. Your family or other caregivers should also be alert to changes in your mood or symptoms. Not approved for use by anyone younger than 25years old. Taking this medicine during pregnancy could harm the baby, but stopping the medicine may not be safe for you. Do not start or stop venlafaxine without asking your doctor. Do not breastfeed. How should I take venlafaxine? Follow all directions on your prescription label and read all medication guides or instruction sheets. Use the medicine exactly as directed. Take with food at the same time each day. Swallow the extended-release capsule or tablet whole and do not crush, chew, break, or open it. If you cannot swallow a capsule whole, open it and mix the medicine with applesauce. Swallow the mixture right away without chewing. Do not stop using venlafaxine suddenly, or you could have unpleasant symptoms (such as agitation, confusion, tingling or electric shock feelings). Ask your doctor how to safely stop using this medicine. Your symptoms may not improve for several weeks. You may have unpleasant symptoms if you stop using venlafaxine suddenly. Ask your doctor before stopping the medicine. Your blood pressure will need to be checked often. This medicine may cause false results on a drug-screening urine test. Tell the laboratory staff that you use venlafaxine. Store at room temperature away from moisture and heat. What happens if I miss a dose? Take the medicine as soon as you can, but skip the missed dose if it is almost time for your next dose. Do not take two doses at one time. What happens if I overdose? Seek emergency medical attention or call the Poison Help line at 1-778.679.9369. What should I avoid while taking venlafaxine? Do not drink alcohol.   Ask your doctor before taking a nonsteroidal anti-inflammatory drug (NSAID) such as aspirin, ibuprofen, naproxen, Advil, Aleve, Motrin, and others. Using an NSAID with venlafaxine may cause you to bruise or bleed easily. Avoid driving or hazardous activity until you know how this medicine will affect you. Your reactions could be impaired. What are the possible side effects of venlafaxine? Get emergency medical help if you have signs of an allergic reaction: skin rash or hives; difficulty breathing; swelling of your face, lips, tongue, or throat. Report any new or worsening symptoms to your doctor, such as: mood or behavior changes, anxiety, panic attacks, trouble sleeping, or if you feel impulsive, irritable, agitated, hostile, aggressive, restless, hyperactive (mentally or physically), more depressed, or have thoughts about suicide or hurting yourself. Call your doctor at once if you have:  · blurred vision, eye pain or redness, seeing halos around lights;  · cough, chest tightness, trouble breathing;  · a seizure (convulsions);  · unusual bleeding --nosebleeds, bleeding gums, abnormal vaginal bleeding, any bleeding that will not stop;  · low blood sodium --headache, confusion, problems with thinking or memory, weakness, feeling unsteady; or  · severe nervous system reaction --very stiff (rigid) muscles, high fever, sweating, confusion, fast or uneven heartbeats, tremors, feeling like you might pass out. Seek medical attention right away if you have symptoms of serotonin syndrome, such as: agitation, hallucinations, fever, sweating, shivering, fast heart rate, muscle stiffness, twitching, loss of coordination, nausea, vomiting, or diarrhea  Common side effects may include:  · headache, dizziness, drowsiness, tiredness;  · feeling anxious, nervous, or jittery;  · sleep problems, unusual dreams;  · tremors;  · fast heartbeats;  · blurred vision;  · nausea, vomiting, diarrhea, constipation;  · changes in weight or appetite;  · dry mouth, yawning;  · increased sweating; or  · sexual problems.   This is not a complete list of side effects and others may occur. Call your doctor for medical advice about side effects. You may report side effects to FDA at 8-225-FDA-4086. What other drugs will affect venlafaxine? Using venlafaxine with other drugs that make you drowsy can worsen this effect. Ask your doctor before using opioid medication, a sleeping pill, a muscle relaxer, or medicine for anxiety or seizures. Tell your doctor about all your current medicines. Many drugs can affect venlafaxine, especially:  · any other antidepressant;  · cimetidine;  · tramadol;  · Danny's wort, tryptophan (sometimes called L-tryptophan);  · diet pills, weight loss medicine (such as phentermine);  · a blood thinner --warfarin, Coumadin, Jantoven;  · medicine to treat mood disorders, thought disorders, or mental illness --buspirone, lithium, and many others; or  · migraine headache medicine --sumatriptan, zolmitriptan, and others. This list is not complete and many other drugs may affect venlafaxine. This includes prescription and over-the-counter medicines, vitamins, and herbal products. Not all possible drug interactions are listed here. Where can I get more information? Your pharmacist can provide more information about venlafaxine. Remember, keep this and all other medicines out of the reach of children, never share your medicines with others, and use this medication only for the indication prescribed. Every effort has been made to ensure that the information provided by [facility] [address] is accurate, up-to-date, and complete, but no guarantee is made to that effect. Drug information contained herein may be time sensitive. [facility] information has been compiled for use by healthcare practitioners and consumers in the [place] and therefore [facility] does not warrant that uses outside of the [place] are appropriate, unless specifically indicated otherwise.  [facility] drug information does not endorse drugs, diagnose patients or recommend therapy. [facility]'s drug information is an informational resource designed to assist licensed healthcare practitioners in caring for their patients and/or to serve consumers viewing this service as a supplement to, and not a substitute for, the expertise, skill, knowledge and judgment of healthcare practitioners. The absence of a warning for a given drug or drug combination in no way should be construed to indicate that the drug or drug combination is safe, effective or appropriate for any given patient. [facility] does not assume any responsibility for any aspect of healthcare administered with the aid of information [facility] provides. The information contained herein is not intended to cover all possible uses, directions, precautions, warnings, drug interactions, allergic reactions, or adverse effects. If you have questions about the drugs you are taking, check with your doctor, nurse or pharmacist.  Copyright 6000-0708 [address] [place] [address] Dr HALE. Version: 16.02. Revision date: 6/17/2021. Care instructions adapted under license by [facility] [place] [address] If you have questions about a medical condition or this instruction, always ask your healthcare professional. [place] [address] any warranty or liability for your use of this information.

## 2022-01-27 ENCOUNTER — PATIENT MESSAGE (OUTPATIENT)
Dept: FAMILY MEDICINE CLINIC | Age: 62
End: 2022-01-27

## 2022-01-27 ENCOUNTER — TELEPHONE (OUTPATIENT)
Dept: FAMILY MEDICINE CLINIC | Age: 62
End: 2022-01-27

## 2022-01-27 NOTE — TELEPHONE ENCOUNTER
Called Pharmacy. I spoke with someone yesterday who states pt was not taking the 200mg Seroquel. Today Pharmacist states he was taking the 200mg and thought we were talking about the Venlafaxine and Paxil change. Seroquel was previously being prescribed by Lauro Shah on [address] [facility]. Last script was sent in March 2021 for 90 days with 3 refills.

## 2022-01-27 NOTE — TELEPHONE ENCOUNTER
From: Valdo Gomez  To:  Kyle Montejo  Sent: 1/27/2022 11:12 AM EST  Subject: queriapine    isrrael    I  a Queriapine 50mg on 1-26-22 , was this changed from 200mg    It is not on yesterdays paper work    Paty Turner

## 2022-02-21 LAB — NONINV COLON CA DNA+OCC BLD SCRN STL QL: POSITIVE

## 2022-02-23 ENCOUNTER — TELEPHONE (OUTPATIENT)
Dept: FAMILY MEDICINE CLINIC | Age: 62
End: 2022-02-23

## 2022-02-23 DIAGNOSIS — R19.5 POSITIVE COLORECTAL CANCER SCREENING USING COLOGUARD TEST: Primary | ICD-10-CM

## 2022-02-23 NOTE — TELEPHONE ENCOUNTER
Please call the patient and let him know that his screening cologuard was positive. He needs a diagnostic colonoscopy. I have placed a referral to dr Donal Dhillon.  Please provide him the contact information

## 2022-02-23 NOTE — TELEPHONE ENCOUNTER
LMTCB for referral information  Message also sent Via [employer]     Scheduling Instructions: [address], [place]  [facility], [address] [place] [address], [address] [place] [address]  Phone: [phone]  Fax: [phone]

## 2022-03-01 ENCOUNTER — TELEPHONE (OUTPATIENT)
Dept: PULMONOLOGY | Age: 62
End: 2022-03-01

## 2022-03-01 ENCOUNTER — TELEMEDICINE (OUTPATIENT)
Dept: SLEEP MEDICINE | Age: 62
End: 2022-03-01
Payer: COMMERCIAL

## 2022-03-01 DIAGNOSIS — G47.10 HYPERSOMNIA: ICD-10-CM

## 2022-03-01 DIAGNOSIS — E66.9 OBESITY (BMI 30-39.9): ICD-10-CM

## 2022-03-01 DIAGNOSIS — I10 PRIMARY HYPERTENSION: ICD-10-CM

## 2022-03-01 DIAGNOSIS — F41.9 ANXIETY: ICD-10-CM

## 2022-03-01 DIAGNOSIS — R06.83 SNORING: Primary | ICD-10-CM

## 2022-03-01 DIAGNOSIS — R53.83 OTHER FATIGUE: ICD-10-CM

## 2022-03-01 PROCEDURE — G8427 DOCREV CUR MEDS BY ELIG CLIN: HCPCS | Performed by: NURSE PRACTITIONER

## 2022-03-01 PROCEDURE — 99244 OFF/OP CNSLTJ NEW/EST MOD 40: CPT | Performed by: NURSE PRACTITIONER

## 2022-03-01 ASSESSMENT — SLEEP AND FATIGUE QUESTIONNAIRES
HOW LIKELY ARE YOU TO NOD OFF OR FALL ASLEEP WHILE SITTING AND READING: 0
HOW LIKELY ARE YOU TO NOD OFF OR FALL ASLEEP WHEN YOU ARE A PASSENGER IN A CAR FOR AN HOUR WITHOUT A BREAK: 2
HOW LIKELY ARE YOU TO NOD OFF OR FALL ASLEEP WHILE WATCHING TV: 2
HOW LIKELY ARE YOU TO NOD OFF OR FALL ASLEEP WHILE SITTING QUIETLY AFTER LUNCH WITHOUT ALCOHOL: 3
HOW LIKELY ARE YOU TO NOD OFF OR FALL ASLEEP WHILE SITTING INACTIVE IN A PUBLIC PLACE: 0
HOW LIKELY ARE YOU TO NOD OFF OR FALL ASLEEP WHILE SITTING AND TALKING TO SOMEONE: 2
ESS TOTAL SCORE: 14
HOW LIKELY ARE YOU TO NOD OFF OR FALL ASLEEP WHILE LYING DOWN TO REST IN THE AFTERNOON WHEN CIRCUMSTANCES PERMIT: 3
HOW LIKELY ARE YOU TO NOD OFF OR FALL ASLEEP IN A CAR, WHILE STOPPED FOR A FEW MINUTES IN TRAFFIC: 2

## 2022-03-01 NOTE — TELEPHONE ENCOUNTER
Patient needs a HST & 31-90d     Patient called with message left for patient to call back to office.

## 2022-03-01 NOTE — LETTER
[facility]  [address]  [address]  Phone: [phone]  Fax: [phone]           JOSE RAMON Siegel CNP      March 1, 2022     Patient: Jenifer Silva   MR Number: <[id number]>   YOB: 1960   Date of Visit: 3/1/2022       Dear Dr. Cyrus Rivas: Thank you for referring Janeth Quezada to me for evaluation/treatment. Below are the relevant portions of my assessment and plan of care. Assessment:       · Snoring  · Hypersomnia   · Fatigue  · Obesity  · Hypertension-followed by cardiology  · Anxietyfollowed by PCP        Plan:      · HST to evaluate forsleep related breathing disorder. · Treatment options were discussed with patient if HST reveals CONNOR, including CPAP therapy, oral appliances and upper airway surgery. · Sleep hygiene  · Avoidsedatives, alcohol and caffeinated drinks at bed time. · No driving motorized vehicles or operating heavy machinery while fatigue, drowsy or sleepy. · Weight loss is also recommended as a long-term intervention. · Complications of CONNOR if not treated were discussed with patient patient to include systemic hypertension, pulmonary hypertension, cardiovascular morbidities, car accidents and all cause mortality. Discussed pathophysiology of CONNOR with patient today  Patient education handout provided regarding sleep tips     If you have questions, please do not hesitate to call me. I look forward to following Catalina Becerra along with you.     Sincerely,        Figueredo Donley, APRN - CNP    CC providers:    No Recipients

## 2022-03-01 NOTE — PROGRESS NOTES
Patient ID: Ben Valles is a 59 y.o. male who is being seen today for   Chief Complaint   Patient presents with    New Patient     Sleep eval ref by Lilian Mcgarry: NANCY Edward/ Dr. Yeyo Camp    HPI:   Ben Valles is a 59 y.o. male  for televideo appointment via video and audio doxy. me virtual visit for snoring, sleep apnea evaluation. Patient reports snoring at night for the past 3 years. Worse in supine position. Wakes self snoring. Unsure witnessed apnea. No restorative sleep. Sometimes dry mouth upon awakening. Fatigue and tiredness during the day. No history of sleep apnea. Bedtime 11 pm and rise time is 9 am. It takes 15 minutes to fall asleep. No TV in bedroom. 2 nocturia. Wakes up 2 times at night. It takes few minutes to fall back a sleep. Takes 1 nap during the day ( 120 minutes). No headache in am. No car wrecks or near wrecks because of the sleepiness. No nodding off while driving. No weight gain. No forgetfulness, +decreased concentration. No nasal congestion at night. Drinks no caffinated beverages per day. No  alcohol. No restless feelings in legs at night. +teeth grinding. No nightmares. No sleep walking. No night time panic attacks. No narcotics. No drug abuse. No history of depression. +history of anxiety-. No history of atrial fibrillation. +history of DM. +history of HTN. No history of ischemic heart disease. No history of stroke. ESS is 14. No smoking. No FH for CONNOR, RLS or narcolepsy.      Sleep Medicine 3/1/2022   Sitting and reading 0   Watching TV 2   Sitting, inactive in a public place (e.g. a theatre or a meeting) 0   As a passenger in a car for an hour without a break 2   Lying down to rest in the afternoon when circumstances permit 3   Sitting and talking to someone 2   Sitting quietly after a lunch without alcohol 3   In a car, while stopped for a few minutes in traffic 2   Total score 14       Past Medical History:  Past Medical History:   Diagnosis Date    Depression     Diabetes mellitus ([facility] [address]     Hypertension        Past Surgical History:    History reviewed. No pertinent surgical history. Allergies:  has No Known Allergies. Social History:    TOBACCO:   reports that he has never smoked. He has never used smokeless tobacco.  ETOH:   reports previous alcohol use.     Family History:       Problem Relation Age of Onset    Heart Disease Mother     High Blood Pressure Mother     Depression Mother     Heart Disease Father     Heart Attack Father     High Blood Pressure Sister     No Known Problems Brother     Heart Disease Maternal Grandmother     High Blood Pressure Maternal Grandmother     Heart Disease Maternal Grandfather     High Blood Pressure Maternal Grandfather     Heart Disease Paternal Grandmother     High Blood Pressure Paternal Grandmother     Heart Disease Paternal Grandfather     High Blood Pressure Paternal Grandfather        Current Medications:    Current Outpatient Medications:     venlafaxine (EFFEXOR XR) 75 MG extended release capsule, Take 1 capsule by mouth daily, Disp: 30 capsule, Rfl: 3    busPIRone (BUSPAR) 10 MG tablet, TAKE 1 TABLET BY MOUTH THREE TIMES A DAY, Disp: 90 tablet, Rfl: 2    atorvastatin (LIPITOR) 40 MG tablet, Take 1 tablet by mouth daily, Disp: 90 tablet, Rfl: 1    metFORMIN (GLUCOPHAGE-XR) 500 MG extended release tablet, Take 1 tablet by mouth daily (with breakfast), Disp: 30 tablet, Rfl: 2    QUEtiapine (SEROQUEL) 50 MG tablet, Take 1 tablet by mouth 2 times daily, Disp: 180 tablet, Rfl: 0    valsartan-hydroCHLOROthiazide (DIOVAN-HCT) 320-25 MG per tablet, Take 1 tablet by mouth daily, Disp: 90 tablet, Rfl: 3    labetalol (NORMODYNE) 100 MG tablet, Take 1 tablet by mouth 2 times daily, Disp: 180 tablet, Rfl: 3    verapamil (CALAN SR) 240 MG extended release tablet, Take 1 tablet by mouth nightly, Disp: 90 tablet, Rfl: 3    Omega-3 Fatty Acids (FISH OIL PO), Take 1,000 mg by mouth 3 times daily, Disp: , Rfl:     aspirin 81 MG EC tablet, Take 81 mg by mouth daily, Disp: , Rfl:       REVIEW OF SYSTEMS:  Review of Systems    Constitutional: Negative for fever  HENT: Negative for sore throat  Eyes: Negative for redness   Respiratory: Negative for dyspnea, cough  Cardiovascular: Negative for chest pain  Gastrointestinal: Negative for vomiting, diarrhea   Genitourinary: Negative for hematuria   Musculoskeletal: Negative forarthralgias   Skin: Negative for rash  Neurological: Negative for syncope  Hematological: Negative for adenopathy  Psychiatric/Behavorial: Negative for anxiety      Objective:   PHYSICAL EXAM:  There were no vitals taken for this visit. Physical Exam  Exam:  Gen: No acute distress, does not appear to be in pain. Appears well developed and nourished. HENT: Head is normocephalic and atraumatic. Normal appearing nose. External Ears normal.   Neck: No visualized mass. Trachea is midline   Eyes: EOM intact. No visible discharge. Resp:No visualized signs of difficulty breathing or respiratory distress, speaking in full sentences. Respiratory effort normal.  Neuro: Awake. Alert. Able to follow commands. No facial asymmetry. Skin: No significant lesions or discoloration noted on facial skin    Musculoskeletal: Normal range of motion of the neck. Psych: Oriented x 3. No anxiety. Normal affect. DATA:   10/15/2021 echo EF 55-60%  Cardiology notes reviewed      Assessment:       · Snoring  · Hypersomnia   · Fatigue  · Obesity  · Hypertension-followed by cardiology  · Anxietyfollowed by PCP        Plan:      · HST to evaluate forsleep related breathing disorder. · Treatment options were discussed with patient if HST reveals CONNOR, including CPAP therapy, oral appliances and upper airway surgery. · Sleep hygiene  · Avoidsedatives, alcohol and caffeinated drinks at bed time.   · No driving motorized vehicles or operating heavy machinery while fatigue, drowsy or sleepy. · Weight loss is also recommended as a long-term intervention. · Complications of CONNOR if not treated were discussed with patient patient to include systemic hypertension, pulmonary hypertension, cardiovascular morbidities, car accidents and all cause mortality.   Discussed pathophysiology of CONNOR with patient today  Patient education handout provided regarding sleep tips

## 2022-03-01 NOTE — TELEPHONE ENCOUNTER
Within this Telehealth Consent, the terms you and yours refer to the person using the Telehealth Service (Service), or in the case of a use of the Service by or on behalf of a minor, you and yours refer to and include (i) the parent or legal guardian who provides consent to the use of the Service by such minor or uses the Service on behalf of such minor, and (ii) the minor for whom consent is being provided or on whose behalf the Service is being utilized. When using Service, you will be consulting with your health care providers via the use of Telehealth.   Telehealth involves the delivery of healthcare services using electronic communications, information technology or other means between a healthcare provider and a patient who are not in the same physical location. Telehealth may be used for diagnosis, treatment, follow-up and/or patient education, and may include, but is not limited to, one or more of the following:    Electronic transmission of medical records, photo images, personal health information or other data between a patient and a healthcare provider    Interactions between a patient and healthcare provider via audio, video and/or data communications    Use of output data from medical devices, sound and video files    Anticipated Benefits   The use of Telehealth by your Provider(s) through the Service may have the following possible benefits:    Making it easier and more efficient for you to access medical care and treatment for the conditions treated by such Provider(s) utilizing the Service    Allowing you to obtain medical care and treatment by Provider(s) at times that are convenient for you    Enabling you to interact with Provider(s) without the necessity of an in-office appointment     Possible Risks   While the use of Telehealth can provide potential benefits for you, there are also potential risks associated with the use of Telehealth.  These risks include, but may not be limited to the following:    Your Provider(s) may not able to provide medical treatment for your particular condition and you may be required to seek alternative healthcare or emergency care services.  The electronic systems or other security protocols or safeguards used in the Service could fail, causing a breach of privacy of your medical or other information.  Given regulatory requirements in certain jurisdictions, your Provider(s) diagnosis and/or treatment options, especially pertaining to certain prescriptions, may be limited. Acceptance   1. You understand that Services will be provided via Telehealth. This process involves the use of HIPAA compliant and secure, real-time audio-visual interfacing with a qualified and appropriately trained provider located at [facility]. 2. You understand that, under no circumstances, will this session be recorded. 3. You understand that the Provider(s) at [facility] and other clinical participants will be party to the information obtained during the Telehealth session in accordance with best medical practices. 4. You understand that the information obtained during the Telehealth session will be used to help determine the most appropriate treatment options. 5. You understand that You have the right to revoke this consent at any point in time. 6. You understand that Telehealth is voluntary, and that continued treatment is not dependent upon consent. 7. You understand that, in the event of non-consent to Telehealth services and/or technical difficulties, you will obtain services as typically provided in the absence of Telehealth technology. 8. You understand that this consent will be kept in Your medical record. 9. No potential benefits from the use of Telehealth or specific results can be guaranteed. Your condition may not be cured or improved and, in some cases, may get worse.    10. There are limitations in the provision of medical care and treatment via Telehealth and the Service and you may not be able to receive diagnosis and/or treatment through the Service for every condition for which you seek diagnosis and/or treatment. 11. There are potential risks to the use of Telehealth, including but not limited to the risks described in this Telehealth Consent. 12. Your Provider(s) have discussed the use of Telehealth and the Service with you, including the benefits and risks of such and you have provided oral consent to your Provider(s) for the use of Telehealth and the Service. 15. You understand that it is your duty to provide your Provider(s) truthful, accurate and complete information, including all relevant information regarding care that you may have received or may be receiving from other healthcare providers outside of the Service. 14. You understand that each of your Provider(s) may determine in his or sole discretion that your condition is not suitable for diagnosis and/or treatment using the Service, and that you may need to seek medical care and treatment a specialist or other healthcare provider, outside of the Service. 15. You understand that you are fully responsible for payment for all services provided by Provider(s) or through use of the Service and that you may not be able to use third-party insurance. 16. You represent that (a) you have read this Telehealth Consent carefully, (b) you understand the risks and benefits of the Service and the use of Telehealth in the medical care and treatment provided to you by Provider(s) using the Service, and (c) you have the legal capacity and authority to provide this consent for yourself and/or the minor for which you are consenting under applicable federal and state laws, including laws relating to the age of [de-identified] and/or parental/guardian consent.    17. You give your informed consent to the use of Telehealth by Provider(s) using the Service under the terms described in the Terms of Service and this Telehealth Consent. The patient was read the following statement and has consented to the visit as of 3/1/22. The patient has been scheduled for their first telehealth visit on 3/1/22 with Oscar Martinez CNP.

## 2022-03-07 NOTE — TELEPHONE ENCOUNTER
Pt CB states he wants to schedule HST at [facility].  Patient was transferred to sleep center to schedule, does not want to schedule f/u until after he completes test

## 2022-03-14 RX ORDER — BUSPIRONE HYDROCHLORIDE 10 MG/1
TABLET ORAL
Qty: 90 TABLET | Refills: 2 | Status: SHIPPED | OUTPATIENT
Start: 2022-03-14 | End: 2022-09-06 | Stop reason: SDUPTHER

## 2022-03-14 NOTE — TELEPHONE ENCOUNTER
Last ov 01/26/2022   Future Appointments   Date Time Provider Boone Marroquin   3/23/2022 11:00 AM JOSE RAMON Mckeon - [facility]   4/11/2022 12:00 PM [address] [facility]

## 2022-03-23 ENCOUNTER — OFFICE VISIT (OUTPATIENT)
Dept: FAMILY MEDICINE CLINIC | Age: 62
End: 2022-03-23
Payer: COMMERCIAL

## 2022-03-23 VITALS
WEIGHT: 237 LBS | SYSTOLIC BLOOD PRESSURE: 148 MMHG | HEART RATE: 87 BPM | OXYGEN SATURATION: 94 % | BODY MASS INDEX: 36.04 KG/M2 | TEMPERATURE: 97.1 F | DIASTOLIC BLOOD PRESSURE: 94 MMHG

## 2022-03-23 DIAGNOSIS — H61.23 BILATERAL HEARING LOSS DUE TO CERUMEN IMPACTION: ICD-10-CM

## 2022-03-23 DIAGNOSIS — E11.9 TYPE 2 DIABETES MELLITUS WITHOUT COMPLICATION, WITHOUT LONG-TERM CURRENT USE OF INSULIN (HCC): Primary | ICD-10-CM

## 2022-03-23 DIAGNOSIS — F32.A DEPRESSION, UNSPECIFIED DEPRESSION TYPE: ICD-10-CM

## 2022-03-23 DIAGNOSIS — I10 UNCONTROLLED HYPERTENSION: ICD-10-CM

## 2022-03-23 DIAGNOSIS — F41.9 ANXIETY: ICD-10-CM

## 2022-03-23 LAB — HBA1C MFR BLD: 8.7 %

## 2022-03-23 PROCEDURE — 3017F COLORECTAL CA SCREEN DOC REV: CPT | Performed by: NURSE PRACTITIONER

## 2022-03-23 PROCEDURE — 2022F DILAT RTA XM EVC RTNOPTHY: CPT | Performed by: NURSE PRACTITIONER

## 2022-03-23 PROCEDURE — G8417 CALC BMI ABV UP PARAM F/U: HCPCS | Performed by: NURSE PRACTITIONER

## 2022-03-23 PROCEDURE — 99214 OFFICE O/P EST MOD 30 MIN: CPT | Performed by: NURSE PRACTITIONER

## 2022-03-23 PROCEDURE — 3052F HG A1C>EQUAL 8.0%<EQUAL 9.0%: CPT | Performed by: NURSE PRACTITIONER

## 2022-03-23 PROCEDURE — G8482 FLU IMMUNIZE ORDER/ADMIN: HCPCS | Performed by: NURSE PRACTITIONER

## 2022-03-23 PROCEDURE — 1036F TOBACCO NON-USER: CPT | Performed by: NURSE PRACTITIONER

## 2022-03-23 PROCEDURE — 83036 HEMOGLOBIN GLYCOSYLATED A1C: CPT | Performed by: NURSE PRACTITIONER

## 2022-03-23 PROCEDURE — G8427 DOCREV CUR MEDS BY ELIG CLIN: HCPCS | Performed by: NURSE PRACTITIONER

## 2022-03-23 RX ORDER — LANCETS 30 GAUGE
1 EACH MISCELLANEOUS DAILY
Qty: 100 EACH | Refills: 5 | Status: SHIPPED | OUTPATIENT
Start: 2022-03-23 | End: 2022-09-15 | Stop reason: SDUPTHER

## 2022-03-23 RX ORDER — VENLAFAXINE HYDROCHLORIDE 150 MG/1
150 CAPSULE, EXTENDED RELEASE ORAL DAILY
Qty: 30 CAPSULE | Refills: 2 | Status: SHIPPED | OUTPATIENT
Start: 2022-03-23 | End: 2022-05-23

## 2022-03-23 RX ORDER — GLUCOSAMINE HCL/CHONDROITIN SU 500-400 MG
CAPSULE ORAL
Qty: 50 STRIP | Refills: 0 | Status: SHIPPED | OUTPATIENT
Start: 2022-03-23 | End: 2022-04-19

## 2022-03-23 ASSESSMENT — PATIENT HEALTH QUESTIONNAIRE - PHQ9
SUM OF ALL RESPONSES TO PHQ QUESTIONS 1-9: 1
SUM OF ALL RESPONSES TO PHQ9 QUESTIONS 1 & 2: 1
SUM OF ALL RESPONSES TO PHQ QUESTIONS 1-9: 1
1. LITTLE INTEREST OR PLEASURE IN DOING THINGS: 0
2. FEELING DOWN, DEPRESSED OR HOPELESS: 1

## 2022-03-23 ASSESSMENT — ENCOUNTER SYMPTOMS
GASTROINTESTINAL NEGATIVE: 1
COUGH: 0
SINUS PAIN: 0
SINUS PRESSURE: 0
EYES NEGATIVE: 1
CHEST TIGHTNESS: 0

## 2022-03-23 NOTE — PROGRESS NOTES
3/23/2022    This is a 59 y.o. male   Chief Complaint   Patient presents with    Anxiety     Pt states doing \"a little better\"    Diabetes    Otalgia     Bilateral - RT is worse    . Yunior Bates is seen today for follow up on diabetes and anxiety. He also complains of bilateral otalgia. Otalgia:he has been noting bilateral ear fullness and decreased hearing over the last week. No fevers or chills. No sinus symptoms. Diabetes: at his last visit metormin was restarted/. He states he tries to watch his diet. However on review he does have a somewhat heavy carb load. He is not currently exercising. He does not check his sugars because he states he does not have a new meter    Anxiety: still not followed by psychiatry. He feels that the Effexor has been more effective in controlling his anxiety. The transition from Paxil to Effexor went well without any noted side effects. He states he still gets some anxiety at times but it is less noticeable or less frequent. Patient Active Problem List   Diagnosis    Depression    Diabetes mellitus ([facility] [address]    Hypertension    Anxiety    Dizziness    Abnormal EKG    History of MI (myocardial infarction)    Chronic fatigue    Obesity (BMI 30-39. 9)       Current Outpatient Medications   Medication Sig Dispense Refill    busPIRone (BUSPAR) 10 MG tablet TAKE 1 TABLET BY MOUTH THREE TIMES A DAY 90 tablet 2    venlafaxine (EFFEXOR XR) 75 MG extended release capsule Take 1 capsule by mouth daily 30 capsule 3    atorvastatin (LIPITOR) 40 MG tablet Take 1 tablet by mouth daily 90 tablet 1    metFORMIN (GLUCOPHAGE-XR) 500 MG extended release tablet Take 1 tablet by mouth daily (with breakfast) 30 tablet 2    QUEtiapine (SEROQUEL) 50 MG tablet Take 1 tablet by mouth 2 times daily 180 tablet 0    valsartan-hydroCHLOROthiazide (DIOVAN-HCT) 320-25 MG per tablet Take 1 tablet by mouth daily 90 tablet 3    labetalol (NORMODYNE) 100 MG tablet Take 1 tablet by mouth 2 times daily 180 tablet 3    verapamil (CALAN SR) 240 MG extended release tablet Take 1 tablet by mouth nightly 90 tablet 3    Omega-3 Fatty Acids (FISH OIL PO) Take 1,000 mg by mouth 3 times daily      aspirin 81 MG EC tablet Take 81 mg by mouth daily       No current facility-administered medications for this visit. No Known Allergies    BP (!) 148/94 (Site: Left Upper Arm, Position: Sitting)   Pulse 87   Temp 97.1 °F (36.2 °C) (Infrared)   Wt 237 lb (107.5 kg)   SpO2 94%   BMI 36.04 kg/m²     Social History     Tobacco Use    Smoking status: Never Smoker    Smokeless tobacco: Never Used   Substance Use Topics    Alcohol use: Not Currently       Review of Systems   Constitutional: Negative for activity change, appetite change, diaphoresis and unexpected weight change. HENT: Positive for ear pain. Negative for congestion, ear discharge, sinus pressure and sinus pain. Eyes: Negative. Respiratory: Negative for cough and chest tightness. Scheduled for sleep study on April 11   Cardiovascular: Negative for chest pain, palpitations and leg swelling. Denies chest pain, takes all of his medications. Has not yet follow-up with cardiology. Hypertension currently uncontrolled today   Gastrointestinal: Negative. Psychiatric/Behavioral: Positive for dysphoric mood. Negative for self-injury, sleep disturbance and suicidal ideas. The patient is nervous/anxious. Physical Exam  Constitutional:       General: He is not in acute distress. Appearance: Normal appearance. He is obese. He is diaphoretic (But he states that he is just hot and he always sweats when he is warm. ). He is not ill-appearing. HENT:      Head: Normocephalic and atraumatic. Right Ear: External ear normal. There is impacted cerumen. Left Ear: External ear normal. There is impacted cerumen. Nose: Nose normal. No congestion or rhinorrhea. Mouth/Throat:      Pharynx: Oropharynx is clear. Eyes:      General:         Right eye: No discharge. Left eye: No discharge. Conjunctiva/sclera: Conjunctivae normal.   Neck:      Trachea: Phonation normal.   Cardiovascular:      Rate and Rhythm: Normal rate and regular rhythm. Pulmonary:      Effort: Pulmonary effort is normal. No respiratory distress. Abdominal:      Palpations: Abdomen is soft. Musculoskeletal:         General: Normal range of motion. Cervical back: Normal range of motion. Skin:     Coloration: Skin is not jaundiced or pale. Neurological:      General: No focal deficit present. Mental Status: He is alert and oriented to person, place, and time. Psychiatric:         Mood and Affect: Mood normal.         Behavior: Behavior normal.         Thought Content: Thought content normal.         Judgment: Judgment normal.         Diagnosis       ICD-10-CM    1. Type 2 diabetes mellitus without complication, without long-term current use of insulin (HCC)  E11.9 POCT glycosylated hemoglobin (Hb A1C)   2. Anxiety  F41.9    3. Depression, unspecified depression type  F32. A    4. Uncontrolled hypertension  I10    5.  Bilateral hearing loss due to cerumen impaction  H61.23         Plan    Start Ladi Bradley, sample to of 2 boxes, 14 tablets total given to patient  Rx sent to pharmacy  Continue Metformin  New testing supplies sent to pharmacy  Check blood sugars daily  Chest pain, palpitations or shortness of breath patient is to report to the emergency room  Patient scheduled with cardiology for Friday for blood pressure follow-up  Follow-up with me in 1 month  Return on Friday for bilateral ear irrigation  Increased Effexor to 150 daily  Orders Placed This Encounter   Procedures    POCT glycosylated hemoglobin (Hb A1C)       Orders Placed This Encounter   Medications    venlafaxine (EFFEXOR XR) 150 MG extended release capsule     Sig: Take 1 capsule by mouth daily     Dispense:  30 capsule     Refill:  2    blood glucose monitor strips     Sig: Test blood sugar once daily fasting and as needed for symptoms of low blood sugar     Dispense:  50 strip     Refill:  0     Brand per patient preference. May round up to next available package size.  blood glucose monitor supplies     Sig: Lancet Device, Control Solution, and meter  Test once times a day & as needed for symptoms of irregular blood glucose. Dispense:  1 each     Refill:  0     Brand per patient preference. May round up to next available package size.  Lancets MISC     Si each by Does not apply route daily     Dispense:  100 each     Refill:  5    dapagliflozin (FARXIGA) 5 MG tablet     Sig: Take 1 tablet by mouth every morning     Dispense:  30 tablet     Refill:  5       Patient Education:  Mora Jacques    Return in about 4 weeks (around 2022) for Mental health, Diabetes chronic care.

## 2022-03-23 NOTE — PATIENT INSTRUCTIONS
Schedule with cardiology  Increased dose of effexor  Start checking blood sugar  Start farxiga  Keep other appointment.

## 2022-03-25 ENCOUNTER — OFFICE VISIT (OUTPATIENT)
Dept: CARDIOLOGY CLINIC | Age: 62
End: 2022-03-25
Payer: COMMERCIAL

## 2022-03-25 ENCOUNTER — OFFICE VISIT (OUTPATIENT)
Dept: FAMILY MEDICINE CLINIC | Age: 62
End: 2022-03-25
Payer: COMMERCIAL

## 2022-03-25 VITALS
DIASTOLIC BLOOD PRESSURE: 94 MMHG | OXYGEN SATURATION: 97 % | SYSTOLIC BLOOD PRESSURE: 142 MMHG | WEIGHT: 237 LBS | BODY MASS INDEX: 36.04 KG/M2 | HEART RATE: 97 BPM | RESPIRATION RATE: 18 BRPM

## 2022-03-25 VITALS
HEART RATE: 95 BPM | DIASTOLIC BLOOD PRESSURE: 92 MMHG | OXYGEN SATURATION: 96 % | BODY MASS INDEX: 35.92 KG/M2 | WEIGHT: 236.99 LBS | HEIGHT: 68 IN | SYSTOLIC BLOOD PRESSURE: 140 MMHG

## 2022-03-25 DIAGNOSIS — H61.23 BILATERAL IMPACTED CERUMEN: ICD-10-CM

## 2022-03-25 DIAGNOSIS — H91.93 DECREASED HEARING OF BOTH EARS: ICD-10-CM

## 2022-03-25 DIAGNOSIS — I10 PRIMARY HYPERTENSION: Primary | ICD-10-CM

## 2022-03-25 PROCEDURE — G8417 CALC BMI ABV UP PARAM F/U: HCPCS | Performed by: INTERNAL MEDICINE

## 2022-03-25 PROCEDURE — G8482 FLU IMMUNIZE ORDER/ADMIN: HCPCS | Performed by: INTERNAL MEDICINE

## 2022-03-25 PROCEDURE — 99214 OFFICE O/P EST MOD 30 MIN: CPT | Performed by: INTERNAL MEDICINE

## 2022-03-25 PROCEDURE — 1036F TOBACCO NON-USER: CPT | Performed by: NURSE PRACTITIONER

## 2022-03-25 PROCEDURE — 1036F TOBACCO NON-USER: CPT | Performed by: INTERNAL MEDICINE

## 2022-03-25 PROCEDURE — G8428 CUR MEDS NOT DOCUMENT: HCPCS | Performed by: NURSE PRACTITIONER

## 2022-03-25 PROCEDURE — G8482 FLU IMMUNIZE ORDER/ADMIN: HCPCS | Performed by: NURSE PRACTITIONER

## 2022-03-25 PROCEDURE — G8417 CALC BMI ABV UP PARAM F/U: HCPCS | Performed by: NURSE PRACTITIONER

## 2022-03-25 PROCEDURE — 99212 OFFICE O/P EST SF 10 MIN: CPT | Performed by: NURSE PRACTITIONER

## 2022-03-25 PROCEDURE — G8427 DOCREV CUR MEDS BY ELIG CLIN: HCPCS | Performed by: INTERNAL MEDICINE

## 2022-03-25 PROCEDURE — 3017F COLORECTAL CA SCREEN DOC REV: CPT | Performed by: NURSE PRACTITIONER

## 2022-03-25 PROCEDURE — 3017F COLORECTAL CA SCREEN DOC REV: CPT | Performed by: INTERNAL MEDICINE

## 2022-03-25 RX ORDER — CLONIDINE HYDROCHLORIDE 0.2 MG/1
0.2 TABLET ORAL 2 TIMES DAILY
Qty: 180 TABLET | Refills: 3 | Status: SHIPPED | OUTPATIENT
Start: 2022-03-25

## 2022-03-25 NOTE — PATIENT INSTRUCTIONS
Thank you for enrolling in [address] Please follow the instructions below to securely access your online medical record. [employer] allows you to send messages to your doctor, view your test results, renew your prescriptions, schedule appointments, and more. How Do I Sign Up? 1. In your Internet browser, go to [email or web address]  2. Click on the Sign Up Now link in the Sign In box. You will see the New Member Sign Up page. 3. Enter your [employer] Access Code exactly as it appears below. You will not need to use this code after youve completed the sign-up process. If you do not sign up before the expiration date, you must request a new code. [employer] Access Code: Activation code not generated  Current [employer] Status: Active    4. Enter your Social Security Number ([id number]) and Date of Birth (mm/dd/yyyy) as indicated and click Submit. You will be taken to the next sign-up page. 5. Create a [employer] ID. This will be your [employer] login ID and cannot be changed, so think of one that is secure and easy to remember. 6. Create a [employer] password. You can change your password at any time. 7. Enter your Password Reset Question and Answer. This can be used at a later time if you forget your password. 8. Enter your e-mail address. You will receive e-mail notification when new information is available in [address] 9. Click Sign Up. You can now view your medical record. Additional Information  If you have questions, please contact the physician practice where you receive care. Remember, [employer] is NOT to be used for urgent needs. For medical emergencies, dial 911. For questions regarding your [employer] account call [phone]. If you have a clinical question, please call your doctor's office. No

## 2022-03-25 NOTE — PROGRESS NOTES
Subjective: Kisha Marie is a 59 y.o. male who presents for evaluation of a plugged ear. He noticed the symptoms in both ears, several weeks ago. Roseanna Navarro denies ear pain. Past Medical History:   Diagnosis Date    Depression     Diabetes mellitus ([address]     Hypertension      Social History     Socioeconomic History    Marital status: Single     Spouse name: None    Number of children: None    Years of education: None    Highest education level: None   Occupational History    None   Tobacco Use    Smoking status: Never Smoker    Smokeless tobacco: Never Used   Vaping Use    Vaping Use: Never used   Substance and Sexual Activity    Alcohol use: Not Currently    Drug use: Not Currently    Sexual activity: Not Currently   Other Topics Concern    None   Social History Narrative    None     Social Determinants of Health     Financial Resource Strain: Low Risk     Difficulty of Paying Living Expenses: Not hard at all   Food Insecurity: No Food Insecurity    Worried About Running Out of Food in the Last Year: Never true    Milagro of Food in the Last Year: Never true   Transportation Needs: No Transportation Needs    Lack of Transportation (Medical): No    Lack of Transportation (Non-Medical):  No   Physical Activity:     Days of Exercise per Week: Not on file    Minutes of Exercise per Session: Not on file   Stress:     Feeling of Stress : Not on file   Social Connections:     Frequency of Communication with Friends and Family: Not on file    Frequency of Social Gatherings with Friends and Family: Not on file    Attends Latter-day Services: Not on file    Active Member of Clubs or Organizations: Not on file    Attends Club or Organization Meetings: Not on file    Marital Status: Not on file   Intimate Partner Violence:     Fear of Current or Ex-Partner: Not on file    Emotionally Abused: Not on file    Physically Abused: Not on file    Sexually Abused: Not on file Housing Stability: Unknown    Unable to Pay for Housing in the Last Year: No    Number of Places Lived in the Last Year: Not on file    Unstable Housing in the Last Year: No     Current Outpatient Medications   Medication Sig Dispense Refill    venlafaxine (EFFEXOR XR) 150 MG extended release capsule Take 1 capsule by mouth daily 30 capsule 2    blood glucose monitor strips Test blood sugar once daily fasting and as needed for symptoms of low blood sugar (Patient not taking: Reported on 3/25/2022) 50 strip 0    blood glucose monitor supplies Lancet Device, Control Solution, and meter  Test once times a day & as needed for symptoms of irregular blood glucose. (Patient not taking: Reported on 3/25/2022) 1 each 0    Lancets MISC 1 each by Does not apply route daily (Patient not taking: Reported on 3/25/2022) 100 each 5    dapagliflozin (FARXIGA) 5 MG tablet Take 1 tablet by mouth every morning 30 tablet 5    busPIRone (BUSPAR) 10 MG tablet TAKE 1 TABLET BY MOUTH THREE TIMES A DAY 90 tablet 2    atorvastatin (LIPITOR) 40 MG tablet Take 1 tablet by mouth daily 90 tablet 1    metFORMIN (GLUCOPHAGE-XR) 500 MG extended release tablet Take 1 tablet by mouth daily (with breakfast) 30 tablet 2    QUEtiapine (SEROQUEL) 50 MG tablet Take 1 tablet by mouth 2 times daily 180 tablet 0    valsartan-hydroCHLOROthiazide (DIOVAN-HCT) 320-25 MG per tablet Take 1 tablet by mouth daily 90 tablet 3    labetalol (NORMODYNE) 100 MG tablet Take 1 tablet by mouth 2 times daily 180 tablet 3    verapamil (CALAN SR) 240 MG extended release tablet Take 1 tablet by mouth nightly 90 tablet 3    Omega-3 Fatty Acids (FISH OIL PO) Take 1,000 mg by mouth 3 times daily      aspirin 81 MG EC tablet Take 81 mg by mouth daily       No current facility-administered medications for this visit.      No Known Allergies    Review of Systems  A comprehensive review of systems was negative except for: Ears, nose, mouth, throat, and face: positive for earaches      Objective:      BP (!) 142/94 (Site: Left Upper Arm, Position: Sitting)   Pulse 97   Resp 18   Wt 237 lb (107.5 kg)   SpO2 97%   BMI 36.04 kg/m²   General: alert, appears stated age and cooperative   Right Ear: right TM could not see and right canal ceruminous, ceruminous: irrigated and inflamed   Left Ear:  left TM could not see and left canal ceruminous, ceruminous: irrigated and inflamed   After removal: normal bilaterally         Assessment:      Cerumen Impaction, without otitis externa. Plan:      Cerumen removed by flushing after use of wax softener and currettage. Care instructions given. Home treatment: none. Follow up as needed.

## 2022-03-25 NOTE — PATIENT INSTRUCTIONS
Plan:  Renal US call central scheduling at [phone] to schedule testing   sleep study as scheduled   Start clonidine 0.2 mg twice a day for better blood pressure control   Cardiac medications reviewed including indications and pertinent side effects    Check blood pressure and heart rate at home a few times per week- keep a log with dates and times and bring to office visit   Regular exercise and following a healthy diet encouraged   Follow up with me in 6 months   Follow up with JOSE RAMON Martinez CNP as scheduled for blood pressure   Your provider has ordered testing for further evaluation. An order/prescription has been included in your paper work.  To schedule outpatient testing, contact Central Scheduling by [facility] ([phone]).

## 2022-03-25 NOTE — PROGRESS NOTES
(BUSPAR) 10 MG tablet TAKE 1 TABLET BY MOUTH THREE TIMES A DAY 3/14/22  Yes JOSE RAMON Marie CNP   atorvastatin (LIPITOR) 40 MG tablet Take 1 tablet by mouth daily 1/26/22  Yes JOSE RAMON Marie CNP   metFORMIN (GLUCOPHAGE-XR) 500 MG extended release tablet Take 1 tablet by mouth daily (with breakfast) 1/26/22  Yes JOSE RAMON Marie CNP   QUEtiapine (SEROQUEL) 50 MG tablet Take 1 tablet by mouth 2 times daily 1/26/22  Yes JOSE RAMON Marie CNP   valsartan-hydroCHLOROthiazide (DIOVAN-HCT) 320-25 MG per tablet Take 1 tablet by mouth daily 7/2/21  Yes Terri Gamboa MD   labetalol (NORMODYNE) 100 MG tablet Take 1 tablet by mouth 2 times daily 7/2/21  Yes Terri Gamboa MD   verapamil (CALAN SR) 240 MG extended release tablet Take 1 tablet by mouth nightly 7/2/21  Yes Terri Gamboa MD   Omega-3 Fatty Acids (FISH OIL PO) Take 1,000 mg by mouth 3 times daily   Yes Historical Provider, MD   aspirin 81 MG EC tablet Take 81 mg by mouth daily   Yes Historical Provider, MD   blood glucose monitor strips Test blood sugar once daily fasting and as needed for symptoms of low blood sugar  Patient not taking: Reported on 3/25/2022 3/23/22   JOSE RAMON Marie CNP   blood glucose monitor supplies Lancet Device, Control Solution, and meter  Test once times a day & as needed for symptoms of irregular blood glucose. Patient not taking: Reported on 3/25/2022 3/23/22   JOSE RAMON Marie CNP   Lancets MISC 1 each by Does not apply route daily  Patient not taking: Reported on 3/25/2022 3/23/22   JOSE RAMON Marie CNP        Allergies:  Patient has no known allergies. Review of Systems:   · Constitutional: there has been no unanticipated weight loss. There's been no change in energy level, sleep pattern, or activity level. · Eyes: No visual changes or diplopia. No scleral icterus. · ENT: No Headaches, hearing loss or vertigo.  No mouth sores or coordination  · No abnormalities of mood, affect, memory, mentation, or behavior are noted    EKG 5/21/2021  Sinus  Rhythm   WITHIN NORMAL LIMITS 63 BPM    Echocardiogram 10/15/2021  Summary   Definity contrast administered. Left ventricular systolic function is normal with ejection fraction   estimated at 55-60 %. No regional wall motion abnormalities are noted. Left ventricular size is decreased. There is mild concentric left ventricular hypertrophy. Normal left ventricular diastolic filling pressure. Aortic valve sclerosis without aortic stenosis. Normal systolic pulmonary artery pressure (SPAP) estimated at 38 mmHg (RA   pressure 8 mmHg). Assessment:   HTN - suboptimal   Anxiety  Obesity   Abnormal EKG  Dizziness meds and BP may be contributing   History of MI - details unknown   Fatigue - suspect sleep apnea. Has sleep study scheduled   HLD - controlled     Plan:  Renal artery duplex - call central scheduling at [phone] to schedule testing   sleep study as scheduled   Start clonidine 0.2 mg twice a day for better blood pressure control   Cardiac medications reviewed including indications and pertinent side effects    Check blood pressure and heart rate at home a few times per week- keep a log with dates and times and bring to office visit   Regular exercise and following a healthy diet encouraged   Follow up with me in 6 months   Follow up with JOSE RAMON Aquino CNP as scheduled next month for blood pressure     Scribe's attestation: This note was scribed in the presence of Dr. Jacquelyn Patrick M.D. By Duran Bal RN    The scribes documentation has been prepared under my direction and personally reviewed by me in its entirety. I confirm that the note above accurately reflects all work, treatment, procedures, and medical decision making performed by me. Dr. Jacquelyn Patrick MD    Thank you for allowing me to participate in the care of this individual.      Favio Kern. Mila Cam M.D., Andrade Willis

## 2022-04-11 ENCOUNTER — HOSPITAL ENCOUNTER (OUTPATIENT)
Dept: SLEEP CENTER | Age: 62
Discharge: HOME | End: 2022-04-13
Payer: COMMERCIAL

## 2022-04-11 DIAGNOSIS — R53.83 OTHER FATIGUE: ICD-10-CM

## 2022-04-11 DIAGNOSIS — I10 PRIMARY HYPERTENSION: ICD-10-CM

## 2022-04-11 DIAGNOSIS — G47.10 HYPERSOMNIA: ICD-10-CM

## 2022-04-11 DIAGNOSIS — R06.83 SNORING: ICD-10-CM

## 2022-04-11 DIAGNOSIS — F41.9 ANXIETY: ICD-10-CM

## 2022-04-11 DIAGNOSIS — E66.9 OBESITY (BMI 30-39.9): ICD-10-CM

## 2022-04-11 PROCEDURE — 95806 SLEEP STUDY UNATT&RESP EFFT: CPT

## 2022-04-18 NOTE — TELEPHONE ENCOUNTER
3/25/2022        Future Appointments   Date Time Provider Boone Marroquin   4/25/2022 11:00 AM JOSE RAMON Spann - [facility]   9/30/2022 12:15 PM oSlo Scott MD [facility]

## 2022-04-19 RX ORDER — METFORMIN HYDROCHLORIDE 500 MG/1
TABLET, EXTENDED RELEASE ORAL
Qty: 30 TABLET | Refills: 2 | Status: SHIPPED | OUTPATIENT
Start: 2022-04-19 | End: 2022-07-13

## 2022-04-19 RX ORDER — BLOOD SUGAR DIAGNOSTIC
STRIP MISCELLANEOUS
Qty: 50 STRIP | Refills: 0 | Status: SHIPPED | OUTPATIENT
Start: 2022-04-19 | End: 2022-06-08

## 2022-04-19 NOTE — TELEPHONE ENCOUNTER
HST from 4/11/2022 reviewed. Showed AHI 17, low SPO2 83%, under 89% 31.5 minutes. Patient has moderate CONNOR.   Can trial auto CPAP 8-16 cm H2O and will need 31-90-day follow-up alternatively can have appointment to discuss results if patient prefers

## 2022-04-19 NOTE — TELEPHONE ENCOUNTER
LOV 3/25/2022    Future Appointments   Date Time Provider Boone Marroquin   4/25/2022 11:00 AM JOSE RAMON Mina - [facility]   9/30/2022 12:15 PM Sarath Bowser MD I [facility]     Please fill in the absence of Margarita Godinez

## 2022-04-25 ENCOUNTER — TELEPHONE (OUTPATIENT)
Dept: PULMONOLOGY | Age: 62
End: 2022-04-25

## 2022-04-25 DIAGNOSIS — G47.33 MODERATE OBSTRUCTIVE SLEEP APNEA: Primary | ICD-10-CM

## 2022-05-18 NOTE — TELEPHONE ENCOUNTER
Called and spoke to Candido at [facility] and she said that they are working his orders and waiting on equipment.     Will continue to follow up

## 2022-05-23 RX ORDER — VENLAFAXINE HYDROCHLORIDE 150 MG/1
CAPSULE, EXTENDED RELEASE ORAL
Qty: 30 CAPSULE | Refills: 2 | Status: SHIPPED | OUTPATIENT
Start: 2022-05-23 | End: 2022-09-06 | Stop reason: SDUPTHER

## 2022-05-23 NOTE — TELEPHONE ENCOUNTER
Last ov 03/25/2022   Future Appointments   Date Time Provider Boone Marroquin   6/10/2022 10:00 [facility]   8/8/2022 10:20 AM [facility] RAMON Muse - [facility]   9/30/2022 12:15 PM Darrell Bain MD [facility]

## 2022-06-07 NOTE — TELEPHONE ENCOUNTER
3/25/22    Future Appointments   Date Time Provider Boone Salcedoi   6/10/2022 10:00 AM [facility], VASCULAR ROOM 1 REBECCAISAAC RUFF  [facility]   8/8/2022 10:20 AM [facility]auff[facility]   9/30/2022 12:15 PM Forrest Buck MD [facility]

## 2022-06-08 RX ORDER — BLOOD SUGAR DIAGNOSTIC
STRIP MISCELLANEOUS
Qty: 50 STRIP | Refills: 5 | Status: SHIPPED | OUTPATIENT
Start: 2022-06-08

## 2022-06-10 ENCOUNTER — HOSPITAL ENCOUNTER (OUTPATIENT)
Dept: VASCULAR LAB | Age: 62
Discharge: HOME | End: 2022-06-10
Payer: COMMERCIAL

## 2022-06-10 DIAGNOSIS — I10 ESSENTIAL HYPERTENSION: ICD-10-CM

## 2022-06-10 PROCEDURE — 93975 VASCULAR STUDY: CPT

## 2022-06-15 ENCOUNTER — TELEPHONE (OUTPATIENT)
Dept: CARDIOLOGY CLINIC | Age: 62
End: 2022-06-15

## 2022-06-15 NOTE — TELEPHONE ENCOUNTER
----- Message from Mckenzie Blanco RN sent at 6/14/2022  4:09 PM EDT -----  Attempt to call patient to review renal artery US. Says he is not available at this time. Study shows- No evidence of bilateral renal artery stenosies.

## 2022-06-28 RX ORDER — LABETALOL 100 MG/1
100 TABLET, FILM COATED ORAL 2 TIMES DAILY
Qty: 180 TABLET | Refills: 3 | Status: SHIPPED | OUTPATIENT
Start: 2022-06-28

## 2022-06-28 RX ORDER — VERAPAMIL HYDROCHLORIDE 240 MG/1
240 TABLET, FILM COATED, EXTENDED RELEASE ORAL NIGHTLY
Qty: 90 TABLET | Refills: 3 | Status: SHIPPED | OUTPATIENT
Start: 2022-06-28

## 2022-06-28 RX ORDER — VALSARTAN AND HYDROCHLOROTHIAZIDE 320; 25 MG/1; MG/1
1 TABLET, FILM COATED ORAL DAILY
Qty: 90 TABLET | Refills: 3 | Status: SHIPPED | OUTPATIENT
Start: 2022-06-28

## 2022-07-13 RX ORDER — METFORMIN HYDROCHLORIDE 500 MG/1
TABLET, EXTENDED RELEASE ORAL
Qty: 30 TABLET | Refills: 2 | Status: SHIPPED | OUTPATIENT
Start: 2022-07-13 | End: 2022-09-15 | Stop reason: SDUPTHER

## 2022-07-13 NOTE — TELEPHONE ENCOUNTER
LOV 3/25/2022    Future Appointments   Date Time Provider Boone Marroquin   8/8/2022 10:20 AM [facility] Byrd [facility]   9/30/2022 12:15 PM Matthias Walton MD [facility]

## 2022-07-14 ENCOUNTER — PATIENT MESSAGE (OUTPATIENT)
Dept: FAMILY MEDICINE CLINIC | Age: 62
End: 2022-07-14

## 2022-07-14 RX ORDER — QUETIAPINE FUMARATE 50 MG/1
50 TABLET, FILM COATED ORAL 2 TIMES DAILY
Qty: 180 TABLET | Refills: 0 | Status: SHIPPED | OUTPATIENT
Start: 2022-07-14 | End: 2022-07-15

## 2022-07-14 NOTE — TELEPHONE ENCOUNTER
3/25/2022    Future Appointments   Date Time Provider Boone Marroquin   8/8/2022 10:20 AM JOSE RAMON Downs CNP   9/9/2022 11:00 AM Zurdo Comfort, APRN - CNP [facility] [place] - [facility]   9/30/2022 12:15 PM Author MD Jessica [facility]

## 2022-07-15 RX ORDER — QUETIAPINE FUMARATE 200 MG/1
TABLET, FILM COATED ORAL
Qty: 180 TABLET | Refills: 0 | Status: SHIPPED | OUTPATIENT
Start: 2022-07-15 | End: 2022-09-15 | Stop reason: SDUPTHER

## 2022-07-15 RX ORDER — QUETIAPINE FUMARATE 200 MG/1
TABLET, FILM COATED ORAL
COMMUNITY
Start: 2022-06-14 | End: 2022-07-15 | Stop reason: SDUPTHER

## 2022-07-18 NOTE — TELEPHONE ENCOUNTER
Future Appointments   Date Time Provider Boone Marroquin   8/8/2022 10:20 AM Ttayana Ka, APRN - CNP [facility]   9/9/2022 11:00 AM Hannah Hennepin, APRN - CNP [facility] [place] - JERRI   9/30/2022 12:15 PM MD ASHWIN Obando     LOV 3/25/2022

## 2022-08-12 ENCOUNTER — TELEPHONE (OUTPATIENT)
Dept: PULMONOLOGY | Age: 62
End: 2022-08-12

## 2022-08-12 NOTE — TELEPHONE ENCOUNTER
Patient cancelled appointment on 08/08/2022 with Philly Pagan CNP for 31-90 day. Reason: Patient preference    Patient did not reschedule appointment.     Appointment rescheduled for n/a    Last OV 03/01/2022

## 2022-08-22 NOTE — TELEPHONE ENCOUNTER
Pt returned call, rescheduled appt for 9/26/22 at [facility] per pt request.

## 2022-08-22 NOTE — TELEPHONE ENCOUNTER
Patient called with message left for patient to call back to office. Office unable to reach patient. Please advise.

## 2022-08-26 RX ORDER — ATORVASTATIN CALCIUM 40 MG/1
TABLET, FILM COATED ORAL
Qty: 90 TABLET | Refills: 1 | Status: SHIPPED | OUTPATIENT
Start: 2022-08-26

## 2022-08-26 NOTE — TELEPHONE ENCOUNTER
Future Appointments   Date Time Provider Boone Marroquin   9/9/2022 11:00 AM Galina Jaqueline, APRN - CNP [facility] [place] - [facility]   9/26/2022  9:20 AM Yuniel Balder, APRN - CNP [facility]   9/30/2022 12:15 PM Casey Burt MD [facility] 3/25/2022

## 2022-09-06 RX ORDER — BUSPIRONE HYDROCHLORIDE 10 MG/1
TABLET ORAL
Qty: 90 TABLET | Refills: 2 | Status: SHIPPED | OUTPATIENT
Start: 2022-09-06 | End: 2022-09-15 | Stop reason: SDUPTHER

## 2022-09-06 RX ORDER — VENLAFAXINE HYDROCHLORIDE 150 MG/1
CAPSULE, EXTENDED RELEASE ORAL
Qty: 30 CAPSULE | Refills: 2 | Status: SHIPPED | OUTPATIENT
Start: 2022-09-06 | End: 2022-09-15 | Stop reason: SDUPTHER

## 2022-09-06 NOTE — TELEPHONE ENCOUNTER
Future Appointments   Date Time Provider Boone Marroquin   9/9/2022 11:00 AM JOSE RAMON Rendon CNP [place] - [facility]   9/26/2022  9:20 AM Nadir Dixmoor, APRN - CNP [facility]   9/30/2022 12:15 PM Danelle Jensen MD [facility] 3/25/2022

## 2022-09-08 RX ORDER — DAPAGLIFLOZIN 5 MG/1
TABLET, FILM COATED ORAL
Qty: 30 TABLET | Refills: 5 | Status: SHIPPED | OUTPATIENT
Start: 2022-09-08 | End: 2022-09-15 | Stop reason: SDUPTHER

## 2022-09-08 NOTE — TELEPHONE ENCOUNTER
Last ov 03/25/2022   Future Appointments   Date Time Provider Boone Marroquin   9/9/2022 11:00 AM Joy Hoops, APRN - CNP [facility] [place] - [facility]   9/26/2022  9:20 AM JOSE RAMON Gudino CNP [facility]   9/30/2022 12:15 PM Savannah Turpin MD [facility]

## 2022-09-15 ENCOUNTER — OFFICE VISIT (OUTPATIENT)
Dept: FAMILY MEDICINE CLINIC | Age: 62
End: 2022-09-15
Payer: COMMERCIAL

## 2022-09-15 VITALS
OXYGEN SATURATION: 99 % | BODY MASS INDEX: 36.04 KG/M2 | WEIGHT: 237 LBS | RESPIRATION RATE: 16 BRPM | DIASTOLIC BLOOD PRESSURE: 86 MMHG | HEART RATE: 87 BPM | SYSTOLIC BLOOD PRESSURE: 134 MMHG

## 2022-09-15 DIAGNOSIS — E11.9 TYPE 2 DIABETES MELLITUS WITHOUT COMPLICATION, WITHOUT LONG-TERM CURRENT USE OF INSULIN (HCC): Primary | ICD-10-CM

## 2022-09-15 DIAGNOSIS — E11.9 TYPE 2 DIABETES MELLITUS WITHOUT COMPLICATION, WITHOUT LONG-TERM CURRENT USE OF INSULIN (HCC): ICD-10-CM

## 2022-09-15 DIAGNOSIS — F41.9 ANXIETY: ICD-10-CM

## 2022-09-15 DIAGNOSIS — I10 PRIMARY HYPERTENSION: ICD-10-CM

## 2022-09-15 DIAGNOSIS — F32.A DEPRESSION, UNSPECIFIED DEPRESSION TYPE: ICD-10-CM

## 2022-09-15 PROBLEM — F41.1 GENERALIZED ANXIETY DISORDER: Status: ACTIVE | Noted: 2021-05-21

## 2022-09-15 LAB — HBA1C MFR BLD: 7.4 %

## 2022-09-15 PROCEDURE — G8417 CALC BMI ABV UP PARAM F/U: HCPCS | Performed by: NURSE PRACTITIONER

## 2022-09-15 PROCEDURE — 83036 HEMOGLOBIN GLYCOSYLATED A1C: CPT | Performed by: NURSE PRACTITIONER

## 2022-09-15 PROCEDURE — 1036F TOBACCO NON-USER: CPT | Performed by: NURSE PRACTITIONER

## 2022-09-15 PROCEDURE — 99214 OFFICE O/P EST MOD 30 MIN: CPT | Performed by: NURSE PRACTITIONER

## 2022-09-15 PROCEDURE — 90674 CCIIV4 VAC NO PRSV 0.5 ML IM: CPT | Performed by: NURSE PRACTITIONER

## 2022-09-15 PROCEDURE — 3051F HG A1C>EQUAL 7.0%<8.0%: CPT | Performed by: NURSE PRACTITIONER

## 2022-09-15 PROCEDURE — 3017F COLORECTAL CA SCREEN DOC REV: CPT | Performed by: NURSE PRACTITIONER

## 2022-09-15 PROCEDURE — 2022F DILAT RTA XM EVC RTNOPTHY: CPT | Performed by: NURSE PRACTITIONER

## 2022-09-15 PROCEDURE — G8427 DOCREV CUR MEDS BY ELIG CLIN: HCPCS | Performed by: NURSE PRACTITIONER

## 2022-09-15 RX ORDER — LANCETS 30 GAUGE
1 EACH MISCELLANEOUS DAILY
Qty: 100 EACH | Refills: 5 | Status: SHIPPED | OUTPATIENT
Start: 2022-09-15

## 2022-09-15 RX ORDER — QUETIAPINE FUMARATE 200 MG/1
TABLET, FILM COATED ORAL
Qty: 180 TABLET | Refills: 0 | Status: SHIPPED | OUTPATIENT
Start: 2022-09-15

## 2022-09-15 RX ORDER — METFORMIN HYDROCHLORIDE 500 MG/1
TABLET, EXTENDED RELEASE ORAL
Qty: 90 TABLET | Refills: 1 | Status: SHIPPED | OUTPATIENT
Start: 2022-09-15

## 2022-09-15 RX ORDER — BUSPIRONE HYDROCHLORIDE 10 MG/1
TABLET ORAL
Qty: 270 TABLET | Refills: 1 | Status: SHIPPED | OUTPATIENT
Start: 2022-09-15

## 2022-09-15 RX ORDER — VENLAFAXINE HYDROCHLORIDE 150 MG/1
CAPSULE, EXTENDED RELEASE ORAL
Qty: 90 CAPSULE | Refills: 1 | Status: SHIPPED | OUTPATIENT
Start: 2022-09-15

## 2022-09-15 ASSESSMENT — PATIENT HEALTH QUESTIONNAIRE - PHQ9
10. IF YOU CHECKED OFF ANY PROBLEMS, HOW DIFFICULT HAVE THESE PROBLEMS MADE IT FOR YOU TO DO YOUR WORK, TAKE CARE OF THINGS AT HOME, OR GET ALONG WITH OTHER PEOPLE: 0
9. THOUGHTS THAT YOU WOULD BE BETTER OFF DEAD, OR OF HURTING YOURSELF: 0
8. MOVING OR SPEAKING SO SLOWLY THAT OTHER PEOPLE COULD HAVE NOTICED. OR THE OPPOSITE, BEING SO FIGETY OR RESTLESS THAT YOU HAVE BEEN MOVING AROUND A LOT MORE THAN USUAL: 0
2. FEELING DOWN, DEPRESSED OR HOPELESS: 0
5. POOR APPETITE OR OVEREATING: 0
SUM OF ALL RESPONSES TO PHQ QUESTIONS 1-9: 0
SUM OF ALL RESPONSES TO PHQ QUESTIONS 1-9: 0
3. TROUBLE FALLING OR STAYING ASLEEP: 0
SUM OF ALL RESPONSES TO PHQ9 QUESTIONS 1 & 2: 0
SUM OF ALL RESPONSES TO PHQ QUESTIONS 1-9: 0
6. FEELING BAD ABOUT YOURSELF - OR THAT YOU ARE A FAILURE OR HAVE LET YOURSELF OR YOUR FAMILY DOWN: 0
SUM OF ALL RESPONSES TO PHQ QUESTIONS 1-9: 0
4. FEELING TIRED OR HAVING LITTLE ENERGY: 0
1. LITTLE INTEREST OR PLEASURE IN DOING THINGS: 0
7. TROUBLE CONCENTRATING ON THINGS, SUCH AS READING THE NEWSPAPER OR WATCHING TELEVISION: 0

## 2022-09-15 ASSESSMENT — ENCOUNTER SYMPTOMS
GASTROINTESTINAL NEGATIVE: 1
CHEST TIGHTNESS: 0
RESPIRATORY NEGATIVE: 1
SHORTNESS OF BREATH: 0

## 2022-09-15 NOTE — PROGRESS NOTES
Vaccine Information Sheet, \"Influenza - Inactivated\"  given to Kristy Lamar, or parent/legal guardian of  Kristy Lamar and verbalized understanding. Patient responses:    Have you ever had a reaction to a flu vaccine? No  Do you have any current illness? No  Have you ever had Guillian Lantry Syndrome? No  Do you have a serious allergy to any of the follow: Neomycin, Polymyxin, Thimerosal, eggs or egg products? No    Flu vaccine given per order. Please see immunization tab. Risks and benefits explained. Current VIS given.       Immunizations Administered       Name Date Dose Route    Influenza, FLUCELVAX, (age 10 mo+), MDCK, PF, 0.5mL 9/15/2022 0.5 mL Intramuscular    Site: Deltoid- Left    Lot: [id number]    NDC: 70524-824-77

## 2022-09-15 NOTE — PROGRESS NOTES
9/15/2022    This is a 57 y.o. male   Chief Complaint   Patient presents with    Diabetes     Has not checked sugars. States he never received glucose monitor    Hypertension   . Jorge Gómez is seen today for follow-up on diabetes as well as hypertension. He has not been able to check his sugars because they did not give him a meter at his last visit. He is trying to watch his diet. He is actively exercising daily with walking. He is trying to make the better choices but review of his diet did show still somewhat higher in carbs. He is tolerating the Pink Guru well and notes no medication side effects. He denies any neuropathy. Hypertension: Monitored by Dr. Tyler Howell, he is checking his blood pressure daily and reports in the 120s to 130s over 70s to 80s range. No chest pain palpitations or shortness of breath. He is taking all of his medications regularly. Anxiety and depression: Feels better after the increased dose to 200 mg twice daily of Seroquel. He is taking his BuSpar regularly and his Effexor. He denies any suicidal ideation. He states overall he feels well controlled on his medications. Patient Active Problem List   Diagnosis    Depression    Diabetes mellitus (HCC)    Hypertension    Generalized anxiety disorder    Dizziness    Abnormal EKG    History of MI (myocardial infarction)    Chronic fatigue    Obesity (BMI 30-39. 9)    Steatosis of liver       Current Outpatient Medications   Medication Sig Dispense Refill    Omega-3 Fatty Acids (FISH OIL) 1000 MG CAPS TAKE 1 CAPSULE BY MOUTH THREE TIMES A DAY      FARXIGA 5 MG tablet TAKE 1 TABLET BY MOUTH EVERY DAY IN THE MORNING 30 tablet 5    venlafaxine (EFFEXOR XR) 150 MG extended release capsule TAKE 1 CAPSULE BY MOUTH EVERY DAY 30 capsule 2    busPIRone (BUSPAR) 10 MG tablet TAKE 1 TABLET BY MOUTH THREE TIMES A DAY 90 tablet 2    atorvastatin (LIPITOR) 40 MG tablet TAKE 1 TABLET BY MOUTH EVERY DAY 90 tablet 1    QUEtiapine (SEROQUEL) I believe she is seeing psyc now, is she still taking zoloft   200 MG tablet TAKE 1 TABLET BY MOUTH TWICE A  tablet 0    metFORMIN (GLUCOPHAGE-XR) 500 MG extended release tablet TAKE 1 TABLET BY MOUTH EVERY DAY WITH BREAKFAST 30 tablet 2    valsartan-hydroCHLOROthiazide (DIOVAN-HCT) 320-25 MG per tablet Take 1 tablet by mouth daily 90 tablet 3    labetalol (NORMODYNE) 100 MG tablet Take 1 tablet by mouth 2 times daily 180 tablet 3    verapamil (CALAN SR) 240 MG extended release tablet Take 1 tablet by mouth nightly 90 tablet 3    ONETOUCH ULTRA strip TEST BLOOD SUGAR ONCE DAILY FASTING AND AS NEEDED FOR SYMPTOMS OF LOW BLOOD SUGAR 50 strip 5    cloNIDine (CATAPRES) 0.2 MG tablet Take 1 tablet by mouth 2 times daily 180 tablet 3    blood glucose monitor supplies Lancet Device, Control Solution, and meter  Test once times a day & as needed for symptoms of irregular blood glucose. (Patient not taking: Reported on 3/25/2022) 1 each 0    Lancets MISC 1 each by Does not apply route daily (Patient not taking: Reported on 3/25/2022) 100 each 5    aspirin 81 MG EC tablet Take 81 mg by mouth daily       No current facility-administered medications for this visit. No Known Allergies    There were no vitals taken for this visit. Social History     Tobacco Use    Smoking status: Never    Smokeless tobacco: Never   Substance Use Topics    Alcohol use: Not Currently       Review of Systems   Constitutional:  Negative for activity change, diaphoresis, fatigue and fever. HENT:  Negative for congestion and postnasal drip. Eyes:         Eye exam overdue, encouraged to schedule   Respiratory: Negative. Negative for chest tightness and shortness of breath. Cardiovascular:  Negative for chest pain, palpitations and leg swelling. Gastrointestinal: Negative. Musculoskeletal: Negative. Neurological: Negative. Psychiatric/Behavioral:  Negative for confusion, dysphoric mood, self-injury, sleep disturbance and suicidal ideas.       Physical Exam  Vitals and nursing note reviewed. Constitutional:       General: He is not in acute distress. Appearance: He is well-developed. He is not diaphoretic. HENT:      Head: Normocephalic and atraumatic. Right Ear: External ear normal.      Left Ear: External ear normal.      Nose: Nose normal.      Mouth/Throat:      Pharynx: No oropharyngeal exudate. Eyes:      General:         Right eye: No discharge. Left eye: No discharge. Conjunctiva/sclera: Conjunctivae normal.   Cardiovascular:      Rate and Rhythm: Normal rate and regular rhythm. Heart sounds: Normal heart sounds. No murmur heard. No friction rub. No gallop. Pulmonary:      Effort: Pulmonary effort is normal. No respiratory distress. Breath sounds: Normal breath sounds. No wheezing or rales. Abdominal:      General: Bowel sounds are normal. There is no distension. Palpations: Abdomen is soft. Tenderness: There is no abdominal tenderness. Musculoskeletal:         General: No tenderness. Normal range of motion. Cervical back: Normal range of motion and neck supple. Lymphadenopathy:      Cervical: No cervical adenopathy. Skin:     General: Skin is warm and dry. Coloration: Skin is not pale. Findings: No erythema or rash. Neurological:      Mental Status: He is alert and oriented to person, place, and time. Motor: No abnormal muscle tone. Coordination: Coordination normal.   Psychiatric:         Behavior: Behavior normal.         Thought Content: Thought content normal.         Judgment: Judgment normal.       Diagnosis       ICD-10-CM    1. Type 2 diabetes mellitus without complication, without long-term current use of insulin ([facility])  E11.9 POCT glycosylated hemoglobin (Hb A1C)     blood glucose monitor kit and supplies     Lancets [facility]     metFORMIN (GLUCOPHAGE-XR) 500 MG extended release tablet     dapagliflozin (FARXIGA) 10 MG tablet     Diabetic Foot Exam     Comprehensive Metabolic Panel      2. Primary hypertension  I10       3. Depression, unspecified depression type  F32. A venlafaxine (EFFEXOR XR) 150 MG extended release capsule     QUEtiapine (SEROQUEL) 200 MG tablet      4. Anxiety  F41.9 venlafaxine (EFFEXOR XR) 150 MG extended release capsule     busPIRone (BUSPAR) 10 MG tablet     QUEtiapine (SEROQUEL) 200 MG tablet           Plan    Diabetes improved, hemoglobin A1c down from 8.7-7.4  Increased dose of Farxiga  CMP today  Recommended scheduled with podiatry  Recommended schedule with ophthalmology for diabetic eye exam  Flu shot today  Refilled medications  Continue current dose of Seroquel, Effexor and BuSpar for anxiety and depression  Continue labetalol, verapamil, clonidine and valsartan hydrochlorothiazide for hypertension  Continue to monitor blood pressure  Keep scheduled appointment with specialist  Orders Placed This Encounter   Procedures    Influenza, FLUCELVAX, (age 10 mo+), IM, Multi-Dose Vial, 0.5 mL    Comprehensive Metabolic Panel     Standing Status:   Future     Standing Expiration Date:   9/15/2023    POCT glycosylated hemoglobin (Hb A1C)    Diabetic Foot Exam       Orders Placed This Encounter   Medications    blood glucose monitor kit and supplies     Sig: Dispense testing kit, meter , and lancing device. Dispense:  1 kit     Refill:  0     Brand per patient/insurance preference.  Please match with current test strips    Lancets MISC     Si each by Does not apply route daily     Dispense:  100 each     Refill:  5    metFORMIN (GLUCOPHAGE-XR) 500 MG extended release tablet     Sig: TAKE 1 TABLET BY MOUTH EVERY DAY WITH BREAKFAST     Dispense:  90 tablet     Refill:  1    dapagliflozin (FARXIGA) 10 MG tablet     Sig: TAKE 1 TABLET BY MOUTH EVERY DAY IN THE MORNING     Dispense:  90 tablet     Refill:  1    venlafaxine (EFFEXOR XR) 150 MG extended release capsule     Sig: TAKE 1 CAPSULE BY MOUTH EVERY DAY     Dispense:  90 capsule     Refill:  1    busPIRone (BUSPAR) 10 MG

## 2022-09-15 NOTE — PATIENT INSTRUCTIONS
Continue medications  Continue exercising  Continue to watch diet  Refilled medications today  Call if they do not give your  test kit.

## 2022-09-16 DIAGNOSIS — R74.8 ELEVATED LIVER ENZYMES: Primary | ICD-10-CM

## 2022-09-16 LAB
A/G RATIO: 1.8 (ref 1.1–2.2)
ALBUMIN SERPL-MCNC: 4.8 G/DL (ref 3.4–5)
ALP BLD-CCNC: 157 U/L (ref 40–129)
ALT SERPL-CCNC: 71 U/L (ref 10–40)
ANION GAP SERPL CALCULATED.3IONS-SCNC: 12 MMOL/L (ref 3–16)
AST SERPL-CCNC: 46 U/L (ref 15–37)
BILIRUB SERPL-MCNC: 0.7 MG/DL (ref 0–1)
BUN BLDV-MCNC: 27 MG/DL (ref 7–20)
CALCIUM SERPL-MCNC: 9.8 MG/DL (ref 8.3–10.6)
CHLORIDE BLD-SCNC: 100 MMOL/L (ref 99–110)
CO2: 27 MMOL/L (ref 21–32)
CREAT SERPL-MCNC: 1.4 MG/DL (ref 0.8–1.3)
GFR AFRICAN AMERICAN: >60
GFR NON-AFRICAN AMERICAN: 51
GLUCOSE BLD-MCNC: 148 MG/DL (ref 70–99)
POTASSIUM SERPL-SCNC: 4.4 MMOL/L (ref 3.5–5.1)
SODIUM BLD-SCNC: 139 MMOL/L (ref 136–145)
TOTAL PROTEIN: 7.5 G/DL (ref 6.4–8.2)

## 2022-09-23 ENCOUNTER — TELEPHONE (OUTPATIENT)
Dept: FAMILY MEDICINE CLINIC | Age: 62
End: 2022-09-23

## 2022-09-23 NOTE — TELEPHONE ENCOUNTER
[facility] [address], [address] [facility]    Pharmacy called in and said they need a PA for Lancets    [employer] [phone]  rx [address] # [phone]

## 2022-09-26 NOTE — TELEPHONE ENCOUNTER
Submitted PA for Lancets, Key: [id number]. Lancets have been DENIED as they are a PLAN EXCLUSION. See attached letter. Please notify patient. Thank you.

## 2022-09-30 ENCOUNTER — OFFICE VISIT (OUTPATIENT)
Dept: CARDIOLOGY CLINIC | Age: 62
End: 2022-09-30
Payer: COMMERCIAL

## 2022-09-30 VITALS
OXYGEN SATURATION: 95 % | BODY MASS INDEX: 34.56 KG/M2 | WEIGHT: 228 LBS | DIASTOLIC BLOOD PRESSURE: 70 MMHG | SYSTOLIC BLOOD PRESSURE: 110 MMHG | HEART RATE: 83 BPM | HEIGHT: 68 IN

## 2022-09-30 DIAGNOSIS — I10 PRIMARY HYPERTENSION: ICD-10-CM

## 2022-09-30 DIAGNOSIS — R94.31 ABNORMAL EKG: Primary | ICD-10-CM

## 2022-09-30 DIAGNOSIS — R42 DIZZINESS: ICD-10-CM

## 2022-09-30 PROCEDURE — G8417 CALC BMI ABV UP PARAM F/U: HCPCS | Performed by: INTERNAL MEDICINE

## 2022-09-30 PROCEDURE — 3017F COLORECTAL CA SCREEN DOC REV: CPT | Performed by: INTERNAL MEDICINE

## 2022-09-30 PROCEDURE — G8427 DOCREV CUR MEDS BY ELIG CLIN: HCPCS | Performed by: INTERNAL MEDICINE

## 2022-09-30 PROCEDURE — 1036F TOBACCO NON-USER: CPT | Performed by: INTERNAL MEDICINE

## 2022-09-30 PROCEDURE — 99214 OFFICE O/P EST MOD 30 MIN: CPT | Performed by: INTERNAL MEDICINE

## 2022-09-30 NOTE — PATIENT INSTRUCTIONS
Plan:  Cardiac medications reviewed including indications and pertinent side effects. Medication list updated at this visit.    Check blood pressure and heart rate at home a few times per week- keep a log with dates and times and bring to office visit   Regular exercise and following a healthy diet encouraged   Follow up with me in 1 year   Follow up with JOSE RAMON Farrell CNP regarding renal mass- move current appointment up to next available   No refills needed

## 2022-09-30 NOTE — PROGRESS NOTES
[address]   Cardiac Consultation    Referring Provider:  JOSE RAMON Beltran CNP     Chief Complaint   Patient presents with    Follow-up    Hypertension        [facility]   1960    History of Present Illness:   Myranda Suarez is a 57 y.o. male who is here today for follow up for hypertension, dizziness, remote MI, and anxiety. At his last visit an echocardiogram and renal US were ordered but not yet completed. He ws instructed to take Verapamil in the mornings. Renal artery US 6/10/2022 showed an EF of no evidence of bilateral renal artery stenosis, left remal mass measuring 2.5 cm in size. Last visit started clonidine 0.2 mg twice a day. Today he states he has been feeling well since his last visit. Blood pressure has been controlled at home since last medication changes. Tolerating meds w/o side effects. Denies chest pain, shortness of breath, edema, dizziness, palpitations and syncope. Past Medical History:   has a past medical history of Depression, Diabetes mellitus ([address] and Hypertension. Surgical History:   has no past surgical history on file. Social History:   reports that he has never smoked. He has never used smokeless tobacco. He reports that he does not currently use alcohol. He reports that he does not currently use drugs. Family History:  family history includes Depression in his mother; Heart Attack in his father; Heart Disease in his father, maternal grandfather, maternal grandmother, mother, paternal grandfather, and paternal grandmother; High Blood Pressure in his maternal grandfather, maternal grandmother, mother, paternal grandfather, paternal grandmother, and sister; No Known Problems in his brother. Home Medications:  Prior to Admission medications    Medication Sig Start Date End Date Taking? Authorizing Provider   blood glucose monitor kit and supplies Dispense testing kit, meter , and lancing device.  9/15/22  Yes Yusuf Howard JOSE RAMON Sena CNP   Lancets MISC 1 each by Does not apply route daily 9/15/22  Yes JOSE RAMON Armendariz CNP   metFORMIN (GLUCOPHAGE-XR) 500 MG extended release tablet TAKE 1 TABLET BY MOUTH EVERY DAY WITH BREAKFAST 9/15/22  Yes JOSE RAMON Armendariz CNP   dapagliflozin (FARXIGA) 10 MG tablet TAKE 1 TABLET BY MOUTH EVERY DAY IN THE MORNING 9/15/22  Yes JOSE RAMON Armendariz CNP   venlafaxine (EFFEXOR XR) 150 MG extended release capsule TAKE 1 CAPSULE BY MOUTH EVERY DAY 9/15/22  Yes JOSE RAMON Armendariz CNP   busPIRone (BUSPAR) 10 MG tablet TAKE 1 TABLET BY MOUTH THREE TIMES A DAY 9/15/22  Yes JOSE RAMON Armendariz CNP   QUEtiapine (SEROQUEL) 200 MG tablet TAKE 1 TABLET BY MOUTH TWICE A DAY 9/15/22  Yes JOSE RAMON Armendariz CNP   Omega-3 Fatty Acids (FISH OIL) 1000 MG CAPS TAKE 1 CAPSULE BY MOUTH THREE TIMES A DAY 8/26/22  Yes Historical MD Regina   atorvastatin (LIPITOR) 40 MG tablet TAKE 1 TABLET BY MOUTH EVERY DAY 8/26/22  Yes JOSE RAMON Armendariz CNP   valsartan-hydroCHLOROthiazide (DIOVAN-HCT) 320-25 MG per tablet Take 1 tablet by mouth daily 6/28/22  Yes Alejandro Segal MD   labetalol (NORMODYNE) 100 MG tablet Take 1 tablet by mouth 2 times daily 6/28/22  Yes Alejandro Segal MD   verapamil (CALAN SR) 240 MG extended release tablet Take 1 tablet by mouth nightly 6/28/22  Yes Alejandro Segal MD   [facility] ULTRA strip TEST BLOOD SUGAR ONCE DAILY FASTING AND AS NEEDED FOR SYMPTOMS OF LOW BLOOD SUGAR 6/8/22  Yes JOSE RAMON Armendariz CNP   cloNIDine (CATAPRES) 0.2 MG tablet Take 1 tablet by mouth 2 times daily 3/25/22  Yes Alejandro Segal MD   blood glucose monitor supplies Lancet Device, Control Solution, and meter  Test once times a day & as needed for symptoms of irregular blood glucose. Patient taking differently: Lancet Device, Control Solution, and meter  Test once times a day & as needed for symptoms of irregular blood glucose. 3/23/22  Yes JOSE RAMON Henderson - CNP   aspirin 81 MG EC tablet Take 81 mg by mouth daily   Yes Historical Provider, MD        Allergies:  Patient has no known allergies. Review of Systems:   Constitutional: there has been no unanticipated weight loss. There's been no change in energy level, sleep pattern, or activity level. Eyes: No visual changes or diplopia. No scleral icterus. ENT: No Headaches, hearing loss or vertigo. No mouth sores or sore throat. Cardiovascular: Reviewed in HPI  Respiratory: No cough or wheezing, no sputum production. No hematemesis. Gastrointestinal: No abdominal pain, appetite loss, blood in stools. No change in bowel or bladder habits. Genitourinary: No dysuria, trouble voiding, or hematuria. Musculoskeletal:  No gait disturbance, weakness or joint complaints. Integumentary: No rash or pruritis. Neurological: No headache, diplopia, change in muscle strength, numbness or tingling. No change in gait, balance, coordination, mood, affect, memory, mentation, behavior. Psychiatric: No anxiety, no depression. Endocrine: No malaise, fatigue or temperature intolerance. No excessive thirst, fluid intake, or urination. No tremor. Hematologic/Lymphatic: No abnormal bruising or bleeding, blood clots or swollen lymph nodes. Allergic/Immunologic: No nasal congestion or hives. Physical Examination:    /70   Pulse 83   Ht 5' 8\" (1.727 m)   Wt 228 lb (103.4 kg)   SpO2 95%   BMI 34.67 kg/m²   Vitals:    09/30/22 1238   BP: 110/70   Pulse: 83   SpO2: 95%          BP recheck 122/80    Constitutional and General Appearance: NAD   Respiratory:  Normal excursion and expansion without use of accessory muscles  Resp Auscultation: Normal breath sounds without dullness  Cardiovascular:   The apical impulses not displaced  Heart tones are crisp and normal  Cervical veins are not engorged  The carotid upstroke is normal in amplitude and contour without delay or bruit  Normal S1S2, No S3, No Murmur  Peripheral pulses are symmetrical and full  There is no clubbing, cyanosis of the extremities. No edema  Femoral Arteries: 2+ and equal  Pedal Pulses: 2+ and equal   Abdomen:  No masses or tenderness  Liver/Spleen: No Abnormalities Noted  Neurological/Psychiatric:  Alert and oriented in all spheres  Moves all extremities well  Exhibits normal gait balance and coordination  No abnormalities of mood, affect, memory, mentation, or behavior are noted    EKG 5/21/2021  Sinus  Rhythm   WITHIN NORMAL LIMITS 63 BPM    Echocardiogram 10/15/2021  Summary   Definity contrast administered. Left ventricular systolic function is normal with ejection fraction   estimated at 55-60 %. No regional wall motion abnormalities are noted. Left ventricular size is decreased. There is mild concentric left ventricular hypertrophy. Normal left ventricular diastolic filling pressure. Aortic valve sclerosis without aortic stenosis. Normal systolic pulmonary artery pressure (SPAP) estimated at 38 mmHg (RA   pressure 8 mmHg). Renal artery US 6/10/2022  Summary    No evidence of bilateral renal artery stenosies. Left remal mass measuring 2.5 cm in size. Further workup recommended. Latest Reference Range & Units 1/26/22 11:52   CHOLESTEROL, TOTAL, 827631 0 - 199 mg/dL 160   HDL Cholesterol 40 - 60 mg/dL 32 (L)   LDL Calculated <100 mg/dL 96   Triglycerides 0 - 150 mg/dL 161 (H)   VLDL Cholesterol Calculated Not Established mg/dL 32     Assessment:   HTN - much better  Obesity   Abnormal EKG  Dizziness - no sig at this time, decreased  History of MI - details unknown   Fatigue - suspect sleep apnea. Has sleep study scheduled   HLD - controlled   Left remal mass measuring 2.5 cm in size - recommend follow up w/ PCP to discuss next steps     Plan:  Cardiac medications reviewed including indications and pertinent side effects. Medication list updated at this visit.  Remain lipitro 40 mg daily Check blood pressure and heart rate at home a few times per week- keep a log with dates and times and bring to office visit   Regular exercise and following a healthy diet encouraged   Follow up with me in 1 year   Follow up with Melissa Clarity, APRN - CNP regarding renal mass- move current appointment up to next available   No refills needed       Scribe's attestation: This note was scribed in the presence of Dr. Sera Matute M.D. By Jeane Otero RN    The scribes documentation has been prepared under my direction and personally reviewed by me in its entirety. I confirm that the note above accurately reflects all work, treatment, procedures, and medical decision making performed by me. Dr. Sera Matute MD    Thank you for allowing me to participate in the care of this individual.      Sarah Renteria.  Javon Gifford M.D., [place]

## 2022-10-17 ENCOUNTER — TELEMEDICINE (OUTPATIENT)
Dept: FAMILY MEDICINE CLINIC | Age: 62
End: 2022-10-17
Payer: COMMERCIAL

## 2022-10-17 DIAGNOSIS — U07.1 COVID-19: Primary | ICD-10-CM

## 2022-10-17 PROCEDURE — 99442 PR PHYS/QHP TELEPHONE EVALUATION 11-20 MIN: CPT | Performed by: NURSE PRACTITIONER

## 2022-10-17 RX ORDER — BENZONATATE 200 MG/1
200 CAPSULE ORAL 3 TIMES DAILY PRN
Qty: 30 CAPSULE | Refills: 0 | Status: SHIPPED | OUTPATIENT
Start: 2022-10-17

## 2022-10-17 ASSESSMENT — ENCOUNTER SYMPTOMS
COUGH: 1
VOMITING: 0
CHANGE IN BOWEL HABIT: 0
SWOLLEN GLANDS: 0
SORE THROAT: 1
ABDOMINAL PAIN: 0
FLU SYMPTOMS: 1
VISUAL CHANGE: 0

## 2022-10-17 NOTE — PROGRESS NOTES
Caty Pablo is a 57 y.o. male evaluated via telephone on 10/17/2022. Consent:  He and/or health care decision maker is aware that that he may receive a bill for this telephone service, depending on his insurance coverage, and has provided verbal consent to proceed: Yes      Documentation:  I communicated with the patient and/or health care decision maker about No chief complaint on file. Gregorio Corea was called to discuss covid. Many of his family members have covid and his symptoms started 2 days ago. He tested positive yesterday    Influenza  This is a new problem. The current episode started in the past 7 days (2 days ago. tested positive for covid yesterday). The problem occurs constantly. The problem has been unchanged. Associated symptoms include chills, congestion, coughing, a fever, headaches, myalgias and a sore throat. Pertinent negatives include no abdominal pain, anorexia, arthralgias, change in bowel habit, chest pain, diaphoresis, numbness, swollen glands, urinary symptoms, vertigo, visual change, vomiting or weakness. Nothing aggravates the symptoms. He has tried acetaminophen for the symptoms. The treatment provided no relief. PLAN      ICD-10-CM    1. COVID-19  U07.1 molnupiravir 200 MG capsule     benzonatate (TESSALON) 200 MG capsule        Discussed continued supportive care  Can not stop seroquel in order to start paxlovid- start molnupiravir  Continue to push fluids  S/s to report to ED discussed  Tessalon pearls for cough  Discussed quarantine and wiping frequently touched surfaces      No orders of the defined types were placed in this encounter. Orders Placed This Encounter   Medications    molnupiravir 200 MG capsule     Sig: Take 4 capsules by mouth in the morning and 4 capsules in the evening. Do all this for 5 days.      Dispense:  40 capsule     Refill:  0     Order Specific Question:   Does this patient qualify for COVID-19 antIviral therapy based on criteria for treatment? Answer:   Yes    benzonatate (TESSALON) 200 MG capsule     Sig: Take 1 capsule by mouth 3 times daily as needed for Cough     Dispense:  30 capsule     Refill:  0           I affirm this is a Patient Initiated Episode with a Patient who has not had a related appointment within my department in the past 7 days or scheduled within the next 24 hours. Patient identification was verified at the start of the visit: Yes    Total Time: minutes: 11-20 minutes    Note: not billable if this call serves to triage the patient into an appointment for the relevant concern    Demian Davalos, was evaluated through a synchronous (real-time) audio  encounter. The patient (or guardian if applicable) is aware that this is a billable   service, which includes applicable co-pays. This Virtual Visit was conducted with   patient's (and/or legal guardian's) consent. The visit was conducted pursuant to   the emergency declaration under the [address] [place] [address], [address] [place] [address] authority and the [employer] and   [employer] General Act. Patient identification was verified,   and a caregiver was present when appropriate. The patient was located in a   state where the provider was licensed to provide care.     Ronda Myers, APRN - CNP

## 2022-10-18 ENCOUNTER — TELEPHONE (OUTPATIENT)
Dept: FAMILY MEDICINE CLINIC | Age: 62
End: 2022-10-18

## 2022-10-18 NOTE — TELEPHONE ENCOUNTER
Chart audit shows patient had a positive (abnormal) Cologuard test completed . 2/13/2022             Audit shows results were not reviewed by physician/APC. Results have been attached to the correct order and sent to the Clinical Pool and ordering physician/APC.

## 2022-10-25 ENCOUNTER — TELEPHONE (OUTPATIENT)
Dept: PULMONOLOGY | Age: 62
End: 2022-10-25

## 2022-10-25 NOTE — TELEPHONE ENCOUNTER
Patient cancelled appointment on 10/21/2022 with Daniela Guzman CNP for 31-90 day. Reason: Covid      Patient did not reschedule appointment. Appointment rescheduled for n/a.      Last OV 03/01/2022

## 2022-10-25 NOTE — LETTER
JOSE RAMON Quintero CNP        November 8, 2022    [address] [place] [address]      Dear Den Del Castillo:    I am writing you because I have been informed of your cancelled appointments and not wanting to move forward with recommended treatment for your condition. We care about you and the management of your healthcare and want to make sure that you follow up as recommended. As your sleep provider I must stress to you how important it is for you to have the tests  and equipment I order as well to see me in follow up. The tests and equipment are necessary so that I can monitor your condition and manage your sleep issues. As discussed at prior visits, complications of untreated sleep apnea can include:  excessive daytime sleepiness, systemic hypertension, pulmonary hypertension cardiac arrhythmias such as atrial fibrillation, stroke, and increased all - cause mortality. We're sorry you were unhappy with your treatment plan and hope that you are doing well and we would like to continue treating your healthcare needs. Please call the office to let us know your plans for treatment and to schedule an appointment to discuss treatment options.       Sincerely,        JOSE RAMON Quintero CNP Tachy-kirsten syndrome

## 2022-11-08 NOTE — TELEPHONE ENCOUNTER
Patient did not keep follow up appointment as was recommended. Please schedule a follow up visit for this patient when he calls requesting such appointment. Please send certified letter as patient has moderate CONNOR and has not followed up.   Please make referring aware

## 2022-12-29 DIAGNOSIS — F32.A DEPRESSION, UNSPECIFIED DEPRESSION TYPE: ICD-10-CM

## 2022-12-29 DIAGNOSIS — F41.9 ANXIETY: ICD-10-CM

## 2022-12-29 RX ORDER — QUETIAPINE FUMARATE 200 MG/1
TABLET, FILM COATED ORAL
Qty: 60 TABLET | Refills: 2 | Status: SHIPPED | OUTPATIENT
Start: 2022-12-29

## 2022-12-29 NOTE — TELEPHONE ENCOUNTER
LOV 10/17/2022    Future Appointments   Date Time Provider Boone Marroquin   1/18/2023  1:20 PM JOSE RAMON Alfaro - CNP [facility] [place] - [facility]

## 2023-01-18 ENCOUNTER — OFFICE VISIT (OUTPATIENT)
Dept: FAMILY MEDICINE CLINIC | Age: 63
End: 2023-01-18

## 2023-01-18 VITALS
DIASTOLIC BLOOD PRESSURE: 84 MMHG | WEIGHT: 225 LBS | OXYGEN SATURATION: 93 % | SYSTOLIC BLOOD PRESSURE: 128 MMHG | HEART RATE: 95 BPM | BODY MASS INDEX: 34.21 KG/M2 | RESPIRATION RATE: 18 BRPM

## 2023-01-18 DIAGNOSIS — N18.30 SECONDARY DM WITH CKD STAGE 3 AND HYPERTENSION (HCC): ICD-10-CM

## 2023-01-18 DIAGNOSIS — I10 PRIMARY HYPERTENSION: ICD-10-CM

## 2023-01-18 DIAGNOSIS — Z00.00 ENCOUNTER FOR WELL ADULT EXAM WITHOUT ABNORMAL FINDINGS: ICD-10-CM

## 2023-01-18 DIAGNOSIS — Z00.00 ANNUAL PHYSICAL EXAM: ICD-10-CM

## 2023-01-18 DIAGNOSIS — Z72.89 OTHER PROBLEMS RELATED TO LIFESTYLE: ICD-10-CM

## 2023-01-18 DIAGNOSIS — E11.9 TYPE 2 DIABETES MELLITUS WITHOUT COMPLICATION, WITHOUT LONG-TERM CURRENT USE OF INSULIN (HCC): ICD-10-CM

## 2023-01-18 DIAGNOSIS — E13.22 SECONDARY DM WITH CKD STAGE 3 AND HYPERTENSION (HCC): ICD-10-CM

## 2023-01-18 DIAGNOSIS — Z12.5 SCREENING FOR PROSTATE CANCER: ICD-10-CM

## 2023-01-18 DIAGNOSIS — Z11.59 NEED FOR HEPATITIS C SCREENING TEST: ICD-10-CM

## 2023-01-18 DIAGNOSIS — I12.9 SECONDARY DM WITH CKD STAGE 3 AND HYPERTENSION (HCC): ICD-10-CM

## 2023-01-18 DIAGNOSIS — F33.41 RECURRENT MAJOR DEPRESSIVE DISORDER, IN PARTIAL REMISSION (HCC): ICD-10-CM

## 2023-01-18 DIAGNOSIS — F32.A DEPRESSION, UNSPECIFIED DEPRESSION TYPE: ICD-10-CM

## 2023-01-18 DIAGNOSIS — Z23 NEED FOR PROPHYLACTIC VACCINATION WITH TETANUS-DIPHTHERIA (TD): ICD-10-CM

## 2023-01-18 DIAGNOSIS — F41.9 ANXIETY: Primary | ICD-10-CM

## 2023-01-18 LAB
CREATININE URINE POCT: NORMAL
HBA1C MFR BLD: 7.9 %
MICROALBUMIN/CREAT 24H UR: NORMAL MG/G{CREAT}
MICROALBUMIN/CREAT UR-RTO: NORMAL

## 2023-01-18 RX ORDER — LANCETS 30 GAUGE
1 EACH MISCELLANEOUS DAILY
Qty: 100 EACH | Refills: 5 | Status: SHIPPED | OUTPATIENT
Start: 2023-01-18

## 2023-01-18 RX ORDER — VENLAFAXINE HYDROCHLORIDE 150 MG/1
CAPSULE, EXTENDED RELEASE ORAL
Qty: 90 CAPSULE | Refills: 1 | Status: SHIPPED | OUTPATIENT
Start: 2023-01-18

## 2023-01-18 RX ORDER — BLOOD SUGAR DIAGNOSTIC
STRIP MISCELLANEOUS
Qty: 50 STRIP | Refills: 5 | Status: SHIPPED | OUTPATIENT
Start: 2023-01-18

## 2023-01-18 RX ORDER — BUSPIRONE HYDROCHLORIDE 10 MG/1
TABLET ORAL
Qty: 270 TABLET | Refills: 1 | Status: SHIPPED | OUTPATIENT
Start: 2023-01-18

## 2023-01-18 RX ORDER — METFORMIN HYDROCHLORIDE 750 MG/1
750 TABLET, EXTENDED RELEASE ORAL
Qty: 90 TABLET | Refills: 1 | Status: SHIPPED | OUTPATIENT
Start: 2023-01-18

## 2023-01-18 RX ORDER — TETANUS AND DIPHTHERIA TOXOIDS ADSORBED 2; 2 [LF]/.5ML; [LF]/.5ML
0.5 INJECTION INTRAMUSCULAR ONCE
Qty: 0.5 ML | Refills: 0 | Status: SHIPPED | OUTPATIENT
Start: 2023-01-18 | End: 2023-01-18

## 2023-01-18 RX ORDER — QUETIAPINE FUMARATE 200 MG/1
TABLET, FILM COATED ORAL
Qty: 180 TABLET | Refills: 1 | Status: SHIPPED | OUTPATIENT
Start: 2023-01-18

## 2023-01-18 RX ORDER — CHLORAL HYDRATE 500 MG
CAPSULE ORAL
Qty: 90 CAPSULE | Refills: 1 | Status: SHIPPED | OUTPATIENT
Start: 2023-01-18

## 2023-01-18 SDOH — ECONOMIC STABILITY: FOOD INSECURITY: WITHIN THE PAST 12 MONTHS, YOU WORRIED THAT YOUR FOOD WOULD RUN OUT BEFORE YOU GOT MONEY TO BUY MORE.: NEVER TRUE

## 2023-01-18 SDOH — ECONOMIC STABILITY: FOOD INSECURITY: WITHIN THE PAST 12 MONTHS, THE FOOD YOU BOUGHT JUST DIDN'T LAST AND YOU DIDN'T HAVE MONEY TO GET MORE.: NEVER TRUE

## 2023-01-18 ASSESSMENT — ENCOUNTER SYMPTOMS
WHEEZING: 0
COLOR CHANGE: 0
COUGH: 0
ABDOMINAL PAIN: 0
CHEST TIGHTNESS: 0
EYE ITCHING: 0
SORE THROAT: 0
SHORTNESS OF BREATH: 0
NAUSEA: 0
TROUBLE SWALLOWING: 0
SINUS PRESSURE: 0
CONSTIPATION: 0
EYE REDNESS: 0
DIARRHEA: 0

## 2023-01-18 ASSESSMENT — SOCIAL DETERMINANTS OF HEALTH (SDOH): HOW HARD IS IT FOR YOU TO PAY FOR THE VERY BASICS LIKE FOOD, HOUSING, MEDICAL CARE, AND HEATING?: NOT HARD AT ALL

## 2023-01-18 ASSESSMENT — PATIENT HEALTH QUESTIONNAIRE - PHQ9
7. TROUBLE CONCENTRATING ON THINGS, SUCH AS READING THE NEWSPAPER OR WATCHING TELEVISION: 0
9. THOUGHTS THAT YOU WOULD BE BETTER OFF DEAD, OR OF HURTING YOURSELF: 0
3. TROUBLE FALLING OR STAYING ASLEEP: 0
SUM OF ALL RESPONSES TO PHQ9 QUESTIONS 1 & 2: 0
8. MOVING OR SPEAKING SO SLOWLY THAT OTHER PEOPLE COULD HAVE NOTICED. OR THE OPPOSITE, BEING SO FIGETY OR RESTLESS THAT YOU HAVE BEEN MOVING AROUND A LOT MORE THAN USUAL: 0
SUM OF ALL RESPONSES TO PHQ QUESTIONS 1-9: 0
5. POOR APPETITE OR OVEREATING: 0
SUM OF ALL RESPONSES TO PHQ QUESTIONS 1-9: 0
SUM OF ALL RESPONSES TO PHQ QUESTIONS 1-9: 0
4. FEELING TIRED OR HAVING LITTLE ENERGY: 0
2. FEELING DOWN, DEPRESSED OR HOPELESS: 0
SUM OF ALL RESPONSES TO PHQ QUESTIONS 1-9: 0
6. FEELING BAD ABOUT YOURSELF - OR THAT YOU ARE A FAILURE OR HAVE LET YOURSELF OR YOUR FAMILY DOWN: 0
1. LITTLE INTEREST OR PLEASURE IN DOING THINGS: 0
10. IF YOU CHECKED OFF ANY PROBLEMS, HOW DIFFICULT HAVE THESE PROBLEMS MADE IT FOR YOU TO DO YOUR WORK, TAKE CARE OF THINGS AT HOME, OR GET ALONG WITH OTHER PEOPLE: 0

## 2023-01-18 NOTE — PATIENT INSTRUCTIONS
Ask about a different mask for your CPAP this may be helpful in making it easier to wear. Starting a Weight Loss Plan: Care Instructions  Overview     If you're thinking about losing weight, it can be hard to know where to start. Your doctor can help you set up a weight loss plan that best meets your needs. You may want to take a class on nutrition or exercise, or you could join a weight loss support group. If you have questions about how to make changes to your eating or exercise habits, ask your doctor about seeing a registered dietitian or an exercise specialist.  It can be a big challenge to lose weight. But you don't have to make huge changes at once. Make small changes, and stick with them. When those changes become habit, add a few more changes. If you don't think you're ready to make changes right now, try to pick a date in the future. Make an appointment to see your doctor to discuss whether the time is right for you to start a plan. Follow-up care is a key part of your treatment and safety. Be sure to make and go to all appointments, and call your doctor if you are having problems. It's also a good idea to know your test results and keep a list of the medicines you take. How can you care for yourself at home? Set realistic goals. Many people expect to lose much more weight than is likely. A weight loss of 5% to 10% of your body weight may be enough to improve your health. Get family and friends involved to provide support. Talk to them about why you are trying to lose weight, and ask them to help. They can help by participating in exercise and having meals with you, even if they may be eating something different. Find what works best for you. If you do not have time or do not like to cook, a program that offers meal replacement bars or shakes may be better for you.  Or if you like to prepare meals, finding a plan that includes daily menus and recipes may be best.  Ask your doctor about other health professionals who can help you achieve your weight loss goals. A dietitian can help you make healthy changes in your diet. An exercise specialist or  can help you develop a safe and effective exercise program.  A counselor or psychiatrist can help you cope with issues such as depression, anxiety, or family problems that can make it hard to focus on weight loss. Consider joining a support group for people who are trying to lose weight. Your doctor can suggest groups in your area. Where can you learn more? Go to http://www.woods.com/ and enter U357 to learn more about \"Starting a Weight Loss Plan: Care Instructions. \"  Current as of: August 25, 2022               Content Version: 13.5  © 2006-2022 [employer]. Care instructions adapted under license by [facility] ([facility]). If you have questions about a medical condition or this instruction, always ask your healthcare professional. [place] [id number] any warranty or liability for your use of this information. Eating Healthy Foods: Care Instructions  Your Care Instructions     Eating healthy foods can help lower your risk for disease. Healthy food gives you energy and keeps your heart strong, your brain active, your muscles working, and your bones strong. A healthy diet includes a variety of foods from the basic food groups: grains, vegetables, fruits, milk and milk products, and meat and beans. Some people may eat more of their favorite foods from only one food group and, as a result, miss getting the nutrients they need. So, it is important to pay attention not only to what you eat but also to what you are missing from your diet. You can eat a healthy, balanced diet by making a few small changes. Follow-up care is a key part of your treatment and safety. Be sure to make and go to all appointments, and call your doctor if you are having problems.  It's also a good idea to know your test results and keep a list of the medicines you take. How can you care for yourself at home? Look at what you eat  Keep a food diary for a week or two and record everything you eat or drink. Track the number of servings you eat from each food group. For a balanced diet every day, eat a variety of:  6 or more ounce-equivalents of grains, such as cereals, breads, crackers, rice, or pasta, every day. An ounce-equivalent is 1 slice of bread, 1 cup of ready-to-eat cereal, or ½ cup of cooked rice, cooked pasta, or cooked cereal.  2½ cups of vegetables, especially:  Dark-green vegetables such as broccoli and spinach. Orange vegetables such as carrots and sweet potatoes. Dry beans (such as farnsworth and kidney beans) and peas (such as lentils). 2 cups of fresh, frozen, or canned fruit. A small apple or 1 banana or orange equals 1 cup.  3 cups of nonfat or low-fat milk, yogurt, or other milk products. 5½ ounces of meat and beans, such as chicken, fish, lean meat, beans, nuts, and seeds. One egg, 1 tablespoon of peanut butter, ½ ounce nuts or seeds, or ¼ cup of cooked beans equals 1 ounce of meat. Learn how to read food labels for serving sizes and ingredients. Fast-food and convenience-food meals often contain few or no fruits or vegetables. Make sure you eat some fruits and vegetables to make the meal more nutritious. Look at your food diary. For each food group, add up what you have eaten and then divide the total by the number of days. This will give you an idea of how much you are eating from each food group. See if you can find some ways to change your diet to make it more healthy. Start small  Do not try to make dramatic changes to your diet all at once. You might feel that you are missing out on your favorite foods and then be more likely to fail. Start slowly, and gradually change your habits. Try some of the following:  Use whole wheat bread instead of white bread.   Use nonfat or low-fat milk instead of whole milk.  Eat brown rice instead of white rice, and eat whole wheat pasta instead of white-flour pasta. Try low-fat cheeses and low-fat yogurt. Add more fruits and vegetables to meals and have them for snacks. Add lettuce, tomato, cucumber, and onion to sandwiches. Add fruit to yogurt and cereal.  Enjoy food  You can still eat your favorite foods. You just may need to eat less of them. If your favorite foods are high in fat, salt, and sugar, limit how often you eat them, but do not cut them out entirely. Eat a wide variety of foods. Make healthy choices when eating out  The type of restaurant you choose can help you make healthy choices. Even fast-food chains are now offering more low-fat or healthier choices on the menu. Choose smaller portions, or take half of your meal home. When eating out, try:  A veggie pizza with a whole wheat crust or grilled chicken (instead of sausage or pepperoni). Pasta with roasted vegetables, grilled chicken, or marinara sauce instead of cream sauce. A vegetable wrap or grilled chicken wrap. Broiled or poached food instead of fried or breaded items. Make healthy choices easy  Buy packaged, prewashed, ready-to-eat fresh vegetables and fruits, such as baby carrots, salad mixes, and chopped or shredded broccoli and cauliflower. Buy packaged, presliced fruits, such as melon or pineapple. Choose 100% fruit or vegetable juice instead of soda. Limit juice intake to 4 to 6 oz (½ to ¾ cup) a day. Blend low-fat yogurt, fruit juice, and canned or frozen fruit to make a smoothie for breakfast or a snack. Where can you learn more? Go to http://www.woods.com/ and enter T756 to learn more about \"Eating Healthy Foods: Care Instructions. \"  Current as of: May 9, 2022               Content Version: 13.5  © 6697-5901 [employer]. Care instructions adapted under license by [facility] [place] ([facility]).  If you have questions about a medical condition or this instruction, always ask your healthcare professional. [place] [address] any warranty or liability for your use of this information. Well Visit, Ages 25 to 72: Care Instructions  Well visits can help you stay healthy. Your doctor has checked your overall health and may have suggested ways to take good care of yourself. Your doctor also may have recommended tests. You can help prevent illness with healthy eating, good sleep, vaccinations, regular exercise, and other steps. Get the tests that you and your doctor decide on. Depending on your age and risks, examples might include screening for diabetes; hepatitis C; HIV; and cervical, breast, lung, and colon cancer. Screening helps find diseases before any symptoms appear. Eat healthy foods. Choose fruits, vegetables, whole grains, lean protein, and low-fat dairy foods. Limit saturated fat and reduce salt. Limit alcohol. Men should have no more than 2 drinks a day. Women should have no more than 1. For some people, no alcohol is the best choice. Exercise. Get at least 30 minutes of exercise on most days of the week. Walking can be a good choice. Reach and stay at your healthy weight. This will lower your risk for many health problems. Take care of your mental health. Try to stay connected with friends, family, and community, and find ways to manage stress. If you're feeling depressed or hopeless, talk to someone. A counselor can help. If you don't have a counselor, talk to your doctor. Talk to your doctor if you think you may have a problem with alcohol or drug use. This includes prescription medicines and illegal drugs. Avoid tobacco and nicotine: Don't smoke, vape, or chew. If you need help quitting, talk to your doctor. Practice safer sex. Getting tested, using condoms or dental dams, and limiting sex partners can help prevent STIs. Use birth control if it's important to you to prevent pregnancy.  Talk with your doctor about your choices and what might be best for you. Prevent problems where you can. Protect your skin from too much sun, wash your hands, brush your teeth twice a day, and wear a seat belt in the car. Where can you learn more? Go to http://www.lunsford.com/ and enter P072 to learn more about \"Well Visit, Ages 25 to 72: Care Instructions. \"  Current as of: March 9, 2022               Content Version: 13.5  © 2006-2022 [employer]. Care instructions adapted under license by [facility] ([facility]). If you have questions about a medical condition or this instruction, always ask your healthcare professional. [address] any warranty or liability for your use of this information. A Healthy Lifestyle: Care Instructions  A healthy lifestyle can help you feel good, have more energy, and stay at a weight that's healthy for you. You can share a healthy lifestyle with your friends and family. And you can do it on your own. Eat meals with your friends or family. You could try cooking together. Plan activities with other people. Go for a walk with a friend, try a free online fitness class, or join a sports league. Eat a variety of healthy foods. These include fruits, vegetables, whole grains, low-fat dairy, and lean protein. Choose healthy portions of food. You can use the Nutrition Facts label on food packages as a guide. Eat more fruits and vegetables. You could add vegetables to sandwiches or add fruit to cereal.   Drink water when you are thirsty. Limit soda, juice, and sports drinks. Try to exercise most days. Aim for at least 2½ hours of exercise each week. Keep moving. Work in the garden or take your dog on a walk. Use the stairs instead of the elevator. If you use tobacco or nicotine, try to quit. Ask your doctor about programs and medicines to help you quit. Limit alcohol. Men should have no more than 2 drinks a day. Women should have no more than 1.  For some people, no alcohol is the best choice. Follow-up care is a key part of your treatment and safety. Be sure to make and go to all appointments, and call your doctor if you are having problems. It's also a good idea to know your test results and keep a list of the medicines you take. Where can you learn more? Go to http://www.lunsford.com/ and enter U807 to learn more about \"A Healthy Lifestyle: Care Instructions. \"  Current as of: March 9, 2022               Content Version: 13.5  © 9875-8868 [employer]. Care instructions adapted under license by [facility] ([facility]). If you have questions about a medical condition or this instruction, always ask your healthcare professional. [address] any warranty or liability for your use of this information.

## 2023-01-18 NOTE — PROGRESS NOTES
Juliana Owen  YOB: 1960    Juliana Owen    Date of Service:  1/18/2023    Chief Complaint:   Juliana Owen is a 57 y.o. male who presents for complete physical examination and follow up on chronic conditions    HPI:     Wt Readings from Last 3 Encounters:   01/18/23 225 lb (102.1 kg)   09/30/22 228 lb (103.4 kg)   09/15/22 237 lb (107.5 kg)       BP Readings from Last 3 Encounters:   01/18/23 128/84   09/30/22 110/70   09/15/22 134/86       Vitals:    01/18/23 1314   BP: 128/84   Site: Left Upper Arm   Position: Sitting   Pulse: 95   Resp: 18   SpO2: 93%   Weight: 225 lb (102.1 kg)       Patient Active Problem List   Diagnosis    Depression    Secondary DM with CKD stage 3 and hypertension (HCC)    Hypertension    Generalized anxiety disorder    Dizziness    Abnormal EKG    History of MI (myocardial infarction)    Chronic fatigue    Obesity (BMI 30-39. 9)    Steatosis of liver    Recurrent major depressive disorder, in partial remission [facility])       Preventive Care:  Health Maintenance   Topic Date Due    Pneumococcal 0-64 years Vaccine (1 - PCV) Never done    HIV screen  Never done    Diabetic Alb to Cr ratio (uACR) test  Never done    Diabetic retinal exam  Never done    Hepatitis C screen  Never done    DTaP/Tdap/Td vaccine (1 - Tdap) Never done    COVID-19 Vaccine (5 - Booster for Pfizer series) 07/06/2022    Lipids  01/26/2023    Diabetic foot exam  09/15/2023    A1C test (Diabetic or Prediabetic)  09/15/2023    Depression Monitoring  09/15/2023    GFR test (Diabetes, CKD 3-4, OR last GFR 15-59)  09/15/2023    Colorectal Cancer Screen  02/13/2025    Flu vaccine  Completed    Shingles vaccine  Completed    Hepatitis A vaccine  Aged Out    Hib vaccine  Aged Out    Meningococcal (ACWY) vaccine  Aged Out      Last eye exam: 2-3 years, normal  Exercise: walks or uses exercise bike 30 min daily  Seatbelt use: yes  Lipid panel:   Lab Results   Component Value Date    CHOL 160 01/26/2022    TRIG 161 (H) 01/26/2022    HDL 32 (L) 01/26/2022    LDLCALC 96 01/26/2022      Screenings due: labs, psa, lipids       Immunization History   Administered Date(s) Administered    COVID-19, PFIZER GRAY top, DO NOT Dilute, (age 15 y+), IM, 30 mcg/0.3 mL 05/11/2022    COVID-19, PFIZER PURPLE top, DILUTE for use, (age 15 y+), 30mcg/0.3mL 03/15/2021, 04/14/2021, 12/14/2021    Influenza, FLUARIX, FLULAVAL, Mara Bogdan (age 10 mo+) AND AFLURIA, (age 1 y+), PF, 0.5mL 09/17/2017, 09/15/2018, 09/28/2019, 09/17/2020, 10/12/2021    Influenza, FLUCELVAX, (age 10 mo+), MDCK, PF, 0.5mL 09/15/2022    Zoster Recombinant (Shingrix) 05/06/2018, 09/02/2018       No Known Allergies    Current Outpatient Medications   Medication Sig Dispense Refill    diptheria-tetanus toxoids (TDVAX) 2-2 LF/0.5ML injection Inject 0.5 mLs into the muscle once for 1 dose 0.5 mL 0    pneumococcal 20-valent conjugat (PREVNAR) 0.5 ML KELLY inj Inject 0.5 mLs into the muscle once for 1 dose 0.5 mL 0    QUEtiapine (SEROQUEL) 200 MG tablet TAKE 1 TABLET BY MOUTH TWICE A DAY 60 tablet 2    Blood Glucose Monitoring Suppl (TRUE METRIX METER) w/Device KIT USE AS DIRECTED TO TEST BLOOD GLUCOSE      blood glucose monitor kit and supplies Dispense testing kit, meter , and lancing device.  1 kit 0    Lancets MISC 1 each by Does not apply route daily 100 each 5    metFORMIN (GLUCOPHAGE-XR) 500 MG extended release tablet TAKE 1 TABLET BY MOUTH EVERY DAY WITH BREAKFAST 90 tablet 1    dapagliflozin (FARXIGA) 10 MG tablet TAKE 1 TABLET BY MOUTH EVERY DAY IN THE MORNING 90 tablet 1    venlafaxine (EFFEXOR XR) 150 MG extended release capsule TAKE 1 CAPSULE BY MOUTH EVERY DAY 90 capsule 1    busPIRone (BUSPAR) 10 MG tablet TAKE 1 TABLET BY MOUTH THREE TIMES A  tablet 1    Omega-3 Fatty Acids (FISH OIL) 1000 MG CAPS TAKE 1 CAPSULE BY MOUTH THREE TIMES A DAY      atorvastatin (LIPITOR) 40 MG tablet TAKE 1 TABLET BY MOUTH EVERY DAY 90 tablet 1 valsartan-hydroCHLOROthiazide (DIOVAN-HCT) 320-25 MG per tablet Take 1 tablet by mouth daily 90 tablet 3    labetalol (NORMODYNE) 100 MG tablet Take 1 tablet by mouth 2 times daily 180 tablet 3    verapamil (CALAN SR) 240 MG extended release tablet Take 1 tablet by mouth nightly 90 tablet 3    ONETOUCH ULTRA strip TEST BLOOD SUGAR ONCE DAILY FASTING AND AS NEEDED FOR SYMPTOMS OF LOW BLOOD SUGAR 50 strip 5    cloNIDine (CATAPRES) 0.2 MG tablet Take 1 tablet by mouth 2 times daily 180 tablet 3    blood glucose monitor supplies Lancet Device, Control Solution, and meter  Test once times a day & as needed for symptoms of irregular blood glucose. (Patient taking differently: Lancet Device, Control Solution, and meter  Test once times a day & as needed for symptoms of irregular blood glucose.) 1 each 0    aspirin 81 MG EC tablet Take 81 mg by mouth daily      benzonatate (TESSALON) 200 MG capsule Take 1 capsule by mouth 3 times daily as needed for Cough (Patient not taking: Reported on 1/18/2023) 30 capsule 0     No current facility-administered medications for this visit. Past Medical History:   Diagnosis Date    Depression     Diabetes mellitus ([facility] [address]     Hypertension        No past surgical history on file.     Family History   Problem Relation Age of Onset    Heart Disease Mother     High Blood Pressure Mother     Depression Mother     Heart Disease Father     Heart Attack Father     High Blood Pressure Sister     No Known Problems Brother     Heart Disease Maternal Grandmother     High Blood Pressure Maternal Grandmother     Heart Disease Maternal Grandfather     High Blood Pressure Maternal Grandfather     Heart Disease Paternal Grandmother     High Blood Pressure Paternal Grandmother     Heart Disease Paternal Grandfather     High Blood Pressure Paternal Grandfather        Social History     Socioeconomic History    Marital status: Single     Spouse name: Not on file    Number of children: Not on file    Years of education: Not on file    Highest education level: Not on file   Occupational History    Not on file   Tobacco Use    Smoking status: Never    Smokeless tobacco: Never   Vaping Use    Vaping Use: Never used   Substance and Sexual Activity    Alcohol use: Not Currently    Drug use: Not Currently    Sexual activity: Not Currently   Other Topics Concern    Not on file   Social History Narrative    Not on file     Social Determinants of Health     Financial Resource Strain: Not on file   Food Insecurity: Not on file   Transportation Needs: Not on file   Physical Activity: Not on file   Stress: Not on file   Social Connections: Not on file   Intimate Partner Violence: Not on file   Housing Stability: Not on file       Review of Systems:  Review of Systems   Constitutional:  Negative for activity change, chills, fatigue, fever and unexpected weight change. HENT:  Negative for ear discharge, mouth sores, postnasal drip, sinus pressure, sore throat and trouble swallowing. No regular dental care   Eyes:  Negative for redness, itching and visual disturbance. Eye exam overdue   Respiratory:  Negative for cough, chest tightness, shortness of breath and wheezing. CONNOR on cpap- not liking the mask   Cardiovascular:  Negative for chest pain, palpitations and leg swelling. HTN- on labetalol, verapamil, diovan and atorvastatin. Also takes clonidine    Stable on medications- followed by cardiology- Dr Flory Jackson   Gastrointestinal:  Negative for abdominal pain, constipation, diarrhea and nausea. Endocrine: Negative for cold intolerance, heat intolerance, polydipsia, polyphagia and polyuria. Diabetes with CKD- on farxiga, metformin  Sugars stable fasting 140s   Genitourinary:  Negative for dysuria, frequency and urgency. Musculoskeletal:  Negative for arthralgias, joint swelling and myalgias. Skin:  Negative for color change, pallor and rash.    Allergic/Immunologic: Negative for environmental allergies, food allergies and immunocompromised state. Neurological:  Negative for dizziness, syncope, weakness and headaches. Hematological:  Does not bruise/bleed easily. Psychiatric/Behavioral:  Negative for behavioral problems, hallucinations and sleep disturbance. The patient is not nervous/anxious. Anxiety and depression- on seroquel, buspar, and effexor     Physical Exam:     Body mass index is 34.21 kg/m². Physical Exam  Vitals and nursing note reviewed. Constitutional:       General: He is not in acute distress. Appearance: He is well-developed. He is not diaphoretic. HENT:      Head: Normocephalic and atraumatic. Right Ear: External ear normal.      Left Ear: External ear normal.      Nose: Nose normal.      Mouth/Throat:      Pharynx: No oropharyngeal exudate. Eyes:      General:         Right eye: No discharge. Left eye: No discharge. Conjunctiva/sclera: Conjunctivae normal.   Cardiovascular:      Rate and Rhythm: Normal rate and regular rhythm. Heart sounds: Normal heart sounds. No murmur heard. No friction rub. No gallop. Pulmonary:      Effort: Pulmonary effort is normal. No respiratory distress. Breath sounds: Normal breath sounds. No wheezing or rales. Abdominal:      General: Bowel sounds are normal. There is no distension. Palpations: Abdomen is soft. Tenderness: There is no abdominal tenderness. Musculoskeletal:         General: No tenderness. Normal range of motion. Cervical back: Normal range of motion and neck supple. Lymphadenopathy:      Cervical: No cervical adenopathy. Skin:     General: Skin is warm and dry. Coloration: Skin is not pale. Findings: No erythema or rash. Neurological:      Mental Status: He is alert and oriented to person, place, and time. Motor: No abnormal muscle tone.       Coordination: Coordination normal.   Psychiatric:         Behavior: Behavior normal. Thought Content: Thought content normal.         Judgment: Judgment normal.       Assessment      ICD-10-CM    1. Annual physical exam  Z00.00       2. Type 2 diabetes mellitus without complication, without long-term current use of insulin (HCC)  E11.9 POCT glycosylated hemoglobin (Hb A1C)     Comprehensive Metabolic Panel     POCT microalbumin     Lancets MISC     dapagliflozin (FARXIGA) 10 MG tablet      3. Primary hypertension  I10 LIPID PANEL     Comprehensive Metabolic Panel      4. Recurrent major depressive disorder, in partial remission ([facility] [address]  F33.41       5. Secondary DM with CKD stage 3 and hypertension (HCC)  E13.22     I12.9     N18.30       6. Encounter for well adult exam without abnormal findings  Z00.00       7. Need for prophylactic vaccination with tetanus-diphtheria (Td)  Z23 diptheria-tetanus toxoids (TDVAX) 2-2 LF/0.5ML injection      8. Other problems related to lifestyle  Z72.89 Hepatitis C Antibody      9. Need for hepatitis C screening test  Z11.59 Hepatitis C Antibody      10. Screening for prostate cancer  Z12.5 PSA Screening      11. Depression, unspecified depression type  F32. A QUEtiapine (SEROQUEL) 200 MG tablet     venlafaxine (EFFEXOR XR) 150 MG extended release capsule      12. Anxiety  F41.9 QUEtiapine (SEROQUEL) 200 MG tablet     venlafaxine (EFFEXOR XR) 150 MG extended release capsule     busPIRone (BUSPAR) 10 MG tablet          Plan    DM- continue farxiga at current dose  Metformin increased to 750  Watch diet a little more closely  DM with CKD- labs today, currentlly stable on ARB, no changes to medication  HTN- stable, compliant with medications, continue follow up with specialist and no changes to medications at this time  Anxiety and depression- mood stable on current medications. Some anxiety but fairly well controlled.     Annual physical- discussed exercise and needed vaccines  Increase water  Lipid panel and psa ordered      Orders Placed This Encounter Medications    diptheria-tetanus toxoids (TDVAX) 2-2 LF/0.5ML injection     Sig: Inject 0.5 mLs into the muscle once for 1 dose     Dispense:  0.5 mL     Refill:  0    pneumococcal 20-valent conjugat (PREVNAR) 0.5 ML KELLY inj     Sig: Inject 0.5 mLs into the muscle once for 1 dose     Dispense:  0.5 mL     Refill:  0          Orders Placed This Encounter   Procedures    Hepatitis C Antibody     Standing Status:   Future     Standing Expiration Date:   1/18/2024    POCT glycosylated hemoglobin (Hb A1C)         Patient Education:    [address] on importance of healthy diet and regular exercise of at least 30 minutes on four or more days during the week. Counseled on skin safety, SPF 27 or higher prior to going outdoors and reapplication every twohours while outside.  Monitor moles for changes, report to provider if greater than 6 mm, color variations, asymmetry, redness, scales, and/or overlying skin changes  Counseled on safety, wear seatbelt, do not consumealcohol and drive or drive with anyone who has consumed alcohol  Counseled on safe sex practices, wear condoms, limit number of sexual partners  Counseled on importance of monthly self testicular exams, monitor for newlumps/bumps/masses, tenderness, new asymmetry, redness, and overlying skin changes, report to provider    Follow Up  6 months

## 2023-01-19 LAB
A/G RATIO: 1.9 (ref 1.1–2.2)
ALBUMIN SERPL-MCNC: 4.8 G/DL (ref 3.4–5)
ALP BLD-CCNC: 150 U/L (ref 40–129)
ALT SERPL-CCNC: 55 U/L (ref 10–40)
ANION GAP SERPL CALCULATED.3IONS-SCNC: 17 MMOL/L (ref 3–16)
AST SERPL-CCNC: 36 U/L (ref 15–37)
BILIRUB SERPL-MCNC: 0.8 MG/DL (ref 0–1)
BUN BLDV-MCNC: 26 MG/DL (ref 7–20)
CALCIUM SERPL-MCNC: 9.5 MG/DL (ref 8.3–10.6)
CHLORIDE BLD-SCNC: 98 MMOL/L (ref 99–110)
CHOLESTEROL, TOTAL: 166 MG/DL (ref 0–199)
CO2: 23 MMOL/L (ref 21–32)
CREAT SERPL-MCNC: 1.4 MG/DL (ref 0.8–1.3)
GFR SERPL CREATININE-BSD FRML MDRD: 56 ML/MIN/{1.73_M2}
GLUCOSE BLD-MCNC: 148 MG/DL (ref 70–99)
HDLC SERPL-MCNC: 32 MG/DL (ref 40–60)
HEPATITIS C ANTIBODY INTERPRETATION: NORMAL
LDL CHOLESTEROL CALCULATED: 85 MG/DL
POTASSIUM SERPL-SCNC: 4 MMOL/L (ref 3.5–5.1)
PROSTATE SPECIFIC ANTIGEN: 1.86 NG/ML (ref 0–4)
SODIUM BLD-SCNC: 138 MMOL/L (ref 136–145)
TOTAL PROTEIN: 7.3 G/DL (ref 6.4–8.2)
TRIGL SERPL-MCNC: 246 MG/DL (ref 0–150)
VLDLC SERPL CALC-MCNC: 49 MG/DL

## 2023-02-20 RX ORDER — ATORVASTATIN CALCIUM 40 MG/1
TABLET, FILM COATED ORAL
Qty: 90 TABLET | Refills: 1 | Status: SHIPPED | OUTPATIENT
Start: 2023-02-20

## 2023-02-20 NOTE — TELEPHONE ENCOUNTER
1/18/2023    Future Appointments   Date Time Provider Department Center   7/26/2023  1:20 PM JOSE RAMON [facility] - [facility]

## 2023-02-28 NOTE — TELEPHONE ENCOUNTER
1/18/2023     LAST FILLED:  1/18/23 90 CAPSULES WITH 1 REFILL    Future Appointments   Date Time Provider [place]   7/26/2023  1:20 PM Lesta Herter, APRN - CNP [facility] [place] [facility]

## 2023-03-01 RX ORDER — CHLORAL HYDRATE 500 MG
CAPSULE ORAL
Qty: 90 CAPSULE | Refills: 1 | Status: SHIPPED | OUTPATIENT
Start: 2023-03-01

## 2023-03-20 RX ORDER — CLONIDINE HYDROCHLORIDE 0.2 MG/1
TABLET ORAL
Qty: 180 TABLET | Refills: 1 | Status: SHIPPED | OUTPATIENT
Start: 2023-03-20

## 2023-04-03 RX ORDER — BLOOD SUGAR DIAGNOSTIC
STRIP MISCELLANEOUS
Qty: 100 STRIP | Refills: 2 | Status: SHIPPED | OUTPATIENT
Start: 2023-04-03

## 2023-04-03 NOTE — TELEPHONE ENCOUNTER
Last ov 01/18/2023   Future Appointments   Date Time Provider [place]   7/26/2023  1:20 PM JOSE RAMON [facility] - [facility] [place]

## 2023-06-05 DIAGNOSIS — F41.9 ANXIETY: ICD-10-CM

## 2023-06-06 RX ORDER — BUSPIRONE HYDROCHLORIDE 10 MG/1
TABLET ORAL
Qty: 270 TABLET | Refills: 1 | Status: SHIPPED | OUTPATIENT
Start: 2023-06-06

## 2023-06-06 RX ORDER — CHLORAL HYDRATE 500 MG
CAPSULE ORAL
Qty: 90 CAPSULE | Refills: 1 | Status: SHIPPED | OUTPATIENT
Start: 2023-06-06

## 2023-06-06 NOTE — TELEPHONE ENCOUNTER
1/18/2023    Future Appointments   Date Time Provider [place]   7/26/2023  1:20 PM Henery Broom, APRN - CNP [facility] [place] [facility]

## 2023-06-21 RX ORDER — VERAPAMIL HYDROCHLORIDE 240 MG/1
240 TABLET, FILM COATED, EXTENDED RELEASE ORAL NIGHTLY
Qty: 90 TABLET | Refills: 1 | Status: SHIPPED | OUTPATIENT
Start: 2023-06-21

## 2023-06-21 RX ORDER — VALSARTAN AND HYDROCHLOROTHIAZIDE 320; 25 MG/1; MG/1
TABLET, FILM COATED ORAL
Qty: 90 TABLET | Refills: 1 | Status: SHIPPED | OUTPATIENT
Start: 2023-06-21

## 2023-07-24 RX ORDER — METFORMIN HYDROCHLORIDE 750 MG/1
TABLET, EXTENDED RELEASE ORAL
Qty: 90 TABLET | Refills: 1 | OUTPATIENT
Start: 2023-07-24

## 2023-07-24 RX ORDER — METFORMIN HYDROCHLORIDE 750 MG/1
750 TABLET, EXTENDED RELEASE ORAL
Qty: 90 TABLET | Refills: 0 | Status: SHIPPED | OUTPATIENT
Start: 2023-07-24 | End: 2023-07-26 | Stop reason: SDUPTHER

## 2023-07-24 NOTE — TELEPHONE ENCOUNTER
Future Appointments   Date Time Provider [address] [place] [address]   7/26/2023  1:20 PM Logan Lesches, APRN - CNP [facility] [place] [facility]     LOV 1/18/2023

## 2023-07-24 NOTE — TELEPHONE ENCOUNTER
1/18/2023        Future Appointments   Date Time Provider [address]  [facility]   7/26/2023  1:20 PM JOSE RAMON Head - CNP [facility] [place] [facility]

## 2023-07-26 ENCOUNTER — OFFICE VISIT (OUTPATIENT)
Dept: FAMILY MEDICINE CLINIC | Age: 63
End: 2023-07-26
Payer: MEDICARE

## 2023-07-26 VITALS
HEART RATE: 94 BPM | WEIGHT: 225.6 LBS | RESPIRATION RATE: 16 BRPM | OXYGEN SATURATION: 95 % | BODY MASS INDEX: 34.3 KG/M2 | DIASTOLIC BLOOD PRESSURE: 88 MMHG | SYSTOLIC BLOOD PRESSURE: 136 MMHG | TEMPERATURE: 97.3 F

## 2023-07-26 DIAGNOSIS — Z23 NEED FOR TDAP VACCINATION: ICD-10-CM

## 2023-07-26 DIAGNOSIS — E11.9 TYPE 2 DIABETES MELLITUS WITHOUT COMPLICATION, WITHOUT LONG-TERM CURRENT USE OF INSULIN (HCC): ICD-10-CM

## 2023-07-26 DIAGNOSIS — E11.9 TYPE 2 DIABETES MELLITUS WITHOUT COMPLICATION, WITHOUT LONG-TERM CURRENT USE OF INSULIN (HCC): Primary | ICD-10-CM

## 2023-07-26 DIAGNOSIS — F32.A DEPRESSION, UNSPECIFIED DEPRESSION TYPE: ICD-10-CM

## 2023-07-26 DIAGNOSIS — F41.9 ANXIETY: ICD-10-CM

## 2023-07-26 LAB — HBA1C MFR BLD: 8.8 %

## 2023-07-26 PROCEDURE — 99214 OFFICE O/P EST MOD 30 MIN: CPT | Performed by: NURSE PRACTITIONER

## 2023-07-26 PROCEDURE — 3079F DIAST BP 80-89 MM HG: CPT | Performed by: NURSE PRACTITIONER

## 2023-07-26 PROCEDURE — 3052F HG A1C>EQUAL 8.0%<EQUAL 9.0%: CPT | Performed by: NURSE PRACTITIONER

## 2023-07-26 PROCEDURE — 3075F SYST BP GE 130 - 139MM HG: CPT | Performed by: NURSE PRACTITIONER

## 2023-07-26 PROCEDURE — 83037 HB GLYCOSYLATED A1C HOME DEV: CPT | Performed by: NURSE PRACTITIONER

## 2023-07-26 PROCEDURE — 1123F ACP DISCUSS/DSCN MKR DOCD: CPT | Performed by: NURSE PRACTITIONER

## 2023-07-26 RX ORDER — BUSPIRONE HYDROCHLORIDE 10 MG/1
TABLET ORAL
Qty: 270 TABLET | Refills: 1 | Status: SHIPPED | OUTPATIENT
Start: 2023-07-26

## 2023-07-26 RX ORDER — VENLAFAXINE HYDROCHLORIDE 150 MG/1
CAPSULE, EXTENDED RELEASE ORAL
Qty: 90 CAPSULE | Refills: 1 | Status: SHIPPED | OUTPATIENT
Start: 2023-07-26

## 2023-07-26 RX ORDER — QUETIAPINE FUMARATE 200 MG/1
TABLET, FILM COATED ORAL
Qty: 180 TABLET | Refills: 1 | Status: SHIPPED | OUTPATIENT
Start: 2023-07-26

## 2023-07-26 RX ORDER — METFORMIN HYDROCHLORIDE 750 MG/1
750 TABLET, EXTENDED RELEASE ORAL
Qty: 90 TABLET | Refills: 0 | Status: SHIPPED | OUTPATIENT
Start: 2023-07-26 | End: 2023-07-26 | Stop reason: DRUGHIGH

## 2023-07-26 SDOH — ECONOMIC STABILITY: INCOME INSECURITY: HOW HARD IS IT FOR YOU TO PAY FOR THE VERY BASICS LIKE FOOD, HOUSING, MEDICAL CARE, AND HEATING?: NOT HARD AT ALL

## 2023-07-26 SDOH — ECONOMIC STABILITY: FOOD INSECURITY: WITHIN THE PAST 12 MONTHS, THE FOOD YOU BOUGHT JUST DIDN'T LAST AND YOU DIDN'T HAVE MONEY TO GET MORE.: NEVER TRUE

## 2023-07-26 SDOH — ECONOMIC STABILITY: FOOD INSECURITY: WITHIN THE PAST 12 MONTHS, YOU WORRIED THAT YOUR FOOD WOULD RUN OUT BEFORE YOU GOT MONEY TO BUY MORE.: NEVER TRUE

## 2023-07-26 ASSESSMENT — PATIENT HEALTH QUESTIONNAIRE - PHQ9
2. FEELING DOWN, DEPRESSED OR HOPELESS: 0
SUM OF ALL RESPONSES TO PHQ QUESTIONS 1-9: 0
8. MOVING OR SPEAKING SO SLOWLY THAT OTHER PEOPLE COULD HAVE NOTICED. OR THE OPPOSITE, BEING SO FIGETY OR RESTLESS THAT YOU HAVE BEEN MOVING AROUND A LOT MORE THAN USUAL: 0
7. TROUBLE CONCENTRATING ON THINGS, SUCH AS READING THE NEWSPAPER OR WATCHING TELEVISION: 0
5. POOR APPETITE OR OVEREATING: 0
1. LITTLE INTEREST OR PLEASURE IN DOING THINGS: 0
4. FEELING TIRED OR HAVING LITTLE ENERGY: 0
SUM OF ALL RESPONSES TO PHQ QUESTIONS 1-9: 0
10. IF YOU CHECKED OFF ANY PROBLEMS, HOW DIFFICULT HAVE THESE PROBLEMS MADE IT FOR YOU TO DO YOUR WORK, TAKE CARE OF THINGS AT HOME, OR GET ALONG WITH OTHER PEOPLE: 0
9. THOUGHTS THAT YOU WOULD BE BETTER OFF DEAD, OR OF HURTING YOURSELF: 0
3. TROUBLE FALLING OR STAYING ASLEEP: 0
6. FEELING BAD ABOUT YOURSELF - OR THAT YOU ARE A FAILURE OR HAVE LET YOURSELF OR YOUR FAMILY DOWN: 0
SUM OF ALL RESPONSES TO PHQ9 QUESTIONS 1 & 2: 0

## 2023-07-26 ASSESSMENT — ANXIETY QUESTIONNAIRES
IF YOU CHECKED OFF ANY PROBLEMS ON THIS QUESTIONNAIRE, HOW DIFFICULT HAVE THESE PROBLEMS MADE IT FOR YOU TO DO YOUR WORK, TAKE CARE OF THINGS AT HOME, OR GET ALONG WITH OTHER PEOPLE: NOT DIFFICULT AT ALL
GAD7 TOTAL SCORE: 0
7. FEELING AFRAID AS IF SOMETHING AWFUL MIGHT HAPPEN: 0
2. NOT BEING ABLE TO STOP OR CONTROL WORRYING: 0
6. BECOMING EASILY ANNOYED OR IRRITABLE: 0
3. WORRYING TOO MUCH ABOUT DIFFERENT THINGS: 0
1. FEELING NERVOUS, ANXIOUS, OR ON EDGE: 0
5. BEING SO RESTLESS THAT IT IS HARD TO SIT STILL: 0
4. TROUBLE RELAXING: 0

## 2023-07-26 ASSESSMENT — ENCOUNTER SYMPTOMS
SHORTNESS OF BREATH: 0
RESPIRATORY NEGATIVE: 1
GASTROINTESTINAL NEGATIVE: 1
WHEEZING: 0
CHOKING: 0

## 2023-07-26 NOTE — PATIENT INSTRUCTIONS
Stop metformin 750 and start metformin 850 mg twice daily  Labs today  No other changes to medications  Follow up in 3 months  Continue diet and exercise efforts

## 2023-07-26 NOTE — PROGRESS NOTES
7/26/2023    This is a 70 y.o. male   Chief Complaint   Patient presents with    Hypertension    Diabetes     120-130     Other     Mental ronan    . Daiana Arellano is seen today for follow up on chronic conditions. Hypertension is managed options and has been well controlled. Is consistent with all of his medications. He denies any chest pain palpitations or shortness of breath. Diabetes is also well controlled. He states his fasting sugars are in the 120s to 130s. He is trying to walk daily at least for 30 minutes and is consistent with all of his medications. He also feels like his mood is well controlled on his Seroquel and BuSpar with Effexor. No medication side effects       Past Medical History:   Diagnosis Date    Depression     Diabetes mellitus ([address])     Hypertension        No past surgical history on file. Social History     Socioeconomic History    Marital status: Single     Spouse name: Not on file    Number of children: Not on file    Years of education: Not on file    Highest education level: Not on file   Occupational History    Not on file   Tobacco Use    Smoking status: Never    Smokeless tobacco: Never   Vaping Use    Vaping Use: Never used   Substance and Sexual Activity    Alcohol use: Not Currently    Drug use: Not Currently    Sexual activity: Not Currently   Other Topics Concern    Not on file   Social History Narrative    Not on file     Social Determinants of Health     Financial Resource Strain: Low Risk     Difficulty of Paying Living Expenses: Not hard at all   Food Insecurity: No Food Insecurity    Worried About Running Out of Food in the Last Year: Never true    [address] in the Last Year: Never true   Transportation Needs: Unknown    Lack of Transportation (Medical): Not on file    Lack of Transportation (Non-Medical):  No   Physical Activity: Not on file   Stress: Not on file   Social Connections: Not on file   Intimate Partner Violence: Not on file   Housing Stability:

## 2023-07-27 ENCOUNTER — PATIENT MESSAGE (OUTPATIENT)
Dept: FAMILY MEDICINE CLINIC | Age: 63
End: 2023-07-27

## 2023-07-27 DIAGNOSIS — R74.8 ELEVATED LIVER ENZYMES: Primary | ICD-10-CM

## 2023-07-27 LAB
ALBUMIN SERPL-MCNC: 4.8 G/DL (ref 3.4–5)
ALBUMIN/GLOB SERPL: 1.8 {RATIO} (ref 1.1–2.2)
ALP SERPL-CCNC: 160 U/L (ref 40–129)
ALT SERPL-CCNC: 83 U/L (ref 10–40)
ANION GAP SERPL CALCULATED.3IONS-SCNC: 17 MMOL/L (ref 3–16)
AST SERPL-CCNC: 58 U/L (ref 15–37)
BILIRUB SERPL-MCNC: 0.9 MG/DL (ref 0–1)
BUN SERPL-MCNC: 20 MG/DL (ref 7–20)
CALCIUM SERPL-MCNC: 9.6 MG/DL (ref 8.3–10.6)
CHLORIDE SERPL-SCNC: 102 MMOL/L (ref 99–110)
CO2 SERPL-SCNC: 21 MMOL/L (ref 21–32)
CREAT SERPL-MCNC: 1.3 MG/DL (ref 0.8–1.3)
GFR SERPLBLD CREATININE-BSD FMLA CKD-EPI: >60 ML/MIN/{1.73_M2}
GLUCOSE SERPL-MCNC: 145 MG/DL (ref 70–99)
POTASSIUM SERPL-SCNC: 4.1 MMOL/L (ref 3.5–5.1)
PROT SERPL-MCNC: 7.4 G/DL (ref 6.4–8.2)
SODIUM SERPL-SCNC: 140 MMOL/L (ref 136–145)

## 2023-08-03 RX ORDER — CHLORAL HYDRATE 500 MG
CAPSULE ORAL
Qty: 90 CAPSULE | Refills: 1 | Status: SHIPPED | OUTPATIENT
Start: 2023-08-03

## 2023-08-03 NOTE — TELEPHONE ENCOUNTER
Future Appointments   Date Time Provider [address]   10/27/2023  2:20 PM Bonnita Loretta, APRN - CNP [facility] [place] [facility]     LOV 7/26/2023

## 2023-09-14 RX ORDER — ATORVASTATIN CALCIUM 40 MG/1
40 TABLET, FILM COATED ORAL DAILY
Qty: 90 TABLET | Refills: 1 | Status: SHIPPED | OUTPATIENT
Start: 2023-09-14

## 2023-09-14 NOTE — TELEPHONE ENCOUNTER
Future Appointments   Date Time Provider [facility] [address]   9/29/2023 11:00 AM JOSE RAMON Zamorano - CNP [facility] [place] [facility]     LOV 7/26/2023

## 2023-09-15 RX ORDER — LABETALOL 100 MG/1
100 TABLET, FILM COATED ORAL 2 TIMES DAILY
Qty: 180 TABLET | Refills: 3 | Status: SHIPPED | OUTPATIENT
Start: 2023-09-15

## 2023-09-15 NOTE — TELEPHONE ENCOUNTER
Last OV:9/30/2022  CMP: 7/26/2023    Plan:  Cardiac medications reviewed including indications and pertinent side effects. Medication list updated at this visit. Remain lipitro 40 mg daily   Check blood pressure and heart rate at home a few times per week- keep a log with dates and times and bring to office visit   Regular exercise and following a healthy diet encouraged   Follow up with me in 1 year   Follow up with JOSE RAMON Johnson CNP regarding renal mass- move current appointment up to next available   No refills needed     FRONT- PT NEEDS APPT.

## 2023-09-15 NOTE — TELEPHONE ENCOUNTER
09/15 called pt @ [phone] and lvm to return call and schedule annual [facility] appt in [facility].

## 2023-09-18 NOTE — TELEPHONE ENCOUNTER
09/18-Called ([phone]) unable to make contact, LM. Second attempt to make contact, next step a letter will be mailed to pts listed address.

## 2023-09-21 NOTE — TELEPHONE ENCOUNTER
9/30/2022 [facility]  No upcoming appt  7/26/2023 [facility]    Please contact pt for yearly appt/med refill appt and route back once scheduled or a letter has been sent.

## 2023-09-21 NOTE — TELEPHONE ENCOUNTER
09/21 called pt @ [phone] and lvm to return call and schedule annual [facility] appt

## 2023-09-22 RX ORDER — VALSARTAN AND HYDROCHLOROTHIAZIDE 320; 25 MG/1; MG/1
TABLET, FILM COATED ORAL
Qty: 90 TABLET | Refills: 0 | Status: SHIPPED | OUTPATIENT
Start: 2023-09-22

## 2023-09-22 RX ORDER — CLONIDINE HYDROCHLORIDE 0.2 MG/1
TABLET ORAL
Qty: 60 TABLET | Refills: 0 | Status: SHIPPED | OUTPATIENT
Start: 2023-09-22

## 2023-09-22 NOTE — TELEPHONE ENCOUNTER
Pt is scheduled with [facility] for 9/25/23.; please refill medication

## 2023-10-16 RX ORDER — METFORMIN HYDROCHLORIDE 750 MG/1
750 TABLET, EXTENDED RELEASE ORAL
Qty: 90 TABLET | Refills: 0 | OUTPATIENT
Start: 2023-10-16

## 2023-10-16 NOTE — TELEPHONE ENCOUNTER
No future appointments. LOV 7/26/2023    Ramonita Cheadle, CNP Will you please fill while Dr. Alanna Webb is out of office?

## 2023-10-20 NOTE — TELEPHONE ENCOUNTER
10/20 Called [phone].  [facility] for pt to call and schedule annual appt with [facility] for medication refill

## 2023-10-23 NOTE — TELEPHONE ENCOUNTER
10/23 (Second Attempt) Called [phone].  [facility] for pt to call and schedule annual appt with [facility] for medication refill

## 2023-10-26 RX ORDER — CLONIDINE HYDROCHLORIDE 0.2 MG/1
0.2 TABLET ORAL 2 TIMES DAILY
Qty: 60 TABLET | Refills: 0 | OUTPATIENT
Start: 2023-10-26

## 2023-10-26 NOTE — TELEPHONE ENCOUNTER
10/26 Called [phone]. [facility] for pt to call and schedule annual appt with [facility] for medication refill.   Will send letter

## 2023-12-04 RX ORDER — LABETALOL 100 MG/1
100 TABLET, FILM COATED ORAL 2 TIMES DAILY
Qty: 180 TABLET | Refills: 3 | Status: SHIPPED | OUTPATIENT
Start: 2023-12-04

## 2023-12-08 NOTE — TELEPHONE ENCOUNTER
9/30/2022 [facility]  1/26/2023 [facility] upcoming appt  7/26/2023 cmp

## 2023-12-11 RX ORDER — METFORMIN HYDROCHLORIDE 750 MG/1
750 TABLET, EXTENDED RELEASE ORAL
Qty: 90 TABLET | Refills: 0 | OUTPATIENT
Start: 2023-12-11

## 2023-12-11 NOTE — TELEPHONE ENCOUNTER
Future Appointments   Date Time Provider [address]   1/26/2024 12:00 PM Franco Gottlieb MD [facility]     LOV 7/26/2023

## 2023-12-11 NOTE — TELEPHONE ENCOUNTER
Future Appointments   Date Time Provider Department Center   1/26/2024 12:00 PM Francis Yeung MD [facility] 7/26/2023    Will you please fill while Dr. Soriano is out of office?

## 2023-12-12 RX ORDER — CHLORAL HYDRATE 500 MG
CAPSULE ORAL
Qty: 90 CAPSULE | Refills: 1 | Status: SHIPPED | OUTPATIENT
Start: 2023-12-12

## 2023-12-12 RX ORDER — VALSARTAN AND HYDROCHLOROTHIAZIDE 320; 25 MG/1; MG/1
TABLET, FILM COATED ORAL
Qty: 90 TABLET | Refills: 0 | Status: SHIPPED | OUTPATIENT
Start: 2023-12-12

## 2023-12-12 RX ORDER — CLONIDINE HYDROCHLORIDE 0.2 MG/1
TABLET ORAL
Qty: 180 TABLET | Refills: 0 | Status: SHIPPED | OUTPATIENT
Start: 2023-12-12

## 2023-12-28 RX ORDER — METFORMIN HYDROCHLORIDE 750 MG/1
750 TABLET, EXTENDED RELEASE ORAL
Qty: 90 TABLET | Refills: 0 | OUTPATIENT
Start: 2023-12-28

## 2023-12-28 NOTE — TELEPHONE ENCOUNTER
Future Appointments   Date Time Provider Department Center   1/26/2024 12:00 PM Francis Yeung MD [facility]     LOV Visit date not found

## 2024-01-19 ENCOUNTER — PATIENT MESSAGE (OUTPATIENT)
Dept: FAMILY MEDICINE CLINIC | Age: 64
End: 2024-01-19

## 2024-01-26 ENCOUNTER — OFFICE VISIT (OUTPATIENT)
Dept: CARDIOLOGY CLINIC | Age: 64
End: 2024-01-26
Payer: MEDICARE

## 2024-01-26 VITALS
HEART RATE: 85 BPM | HEIGHT: 68 IN | SYSTOLIC BLOOD PRESSURE: 122 MMHG | OXYGEN SATURATION: 100 % | BODY MASS INDEX: 33.8 KG/M2 | WEIGHT: 223 LBS | DIASTOLIC BLOOD PRESSURE: 78 MMHG

## 2024-01-26 DIAGNOSIS — E13.22 SECONDARY DM WITH CKD STAGE 3 AND HYPERTENSION (HCC): ICD-10-CM

## 2024-01-26 DIAGNOSIS — E78.2 MIXED HYPERLIPIDEMIA: ICD-10-CM

## 2024-01-26 DIAGNOSIS — I10 PRIMARY HYPERTENSION: Primary | ICD-10-CM

## 2024-01-26 DIAGNOSIS — I12.9 SECONDARY DM WITH CKD STAGE 3 AND HYPERTENSION (HCC): ICD-10-CM

## 2024-01-26 DIAGNOSIS — I25.2 HISTORY OF MI (MYOCARDIAL INFARCTION): ICD-10-CM

## 2024-01-26 DIAGNOSIS — N18.30 SECONDARY DM WITH CKD STAGE 3 AND HYPERTENSION (HCC): ICD-10-CM

## 2024-01-26 DIAGNOSIS — N28.89 RENAL MASS: ICD-10-CM

## 2024-01-26 PROCEDURE — 3017F COLORECTAL CA SCREEN DOC REV: CPT | Performed by: INTERNAL MEDICINE

## 2024-01-26 PROCEDURE — G8427 DOCREV CUR MEDS BY ELIG CLIN: HCPCS | Performed by: INTERNAL MEDICINE

## 2024-01-26 PROCEDURE — 93000 ELECTROCARDIOGRAM COMPLETE: CPT | Performed by: INTERNAL MEDICINE

## 2024-01-26 PROCEDURE — 3046F HEMOGLOBIN A1C LEVEL >9.0%: CPT | Performed by: INTERNAL MEDICINE

## 2024-01-26 PROCEDURE — 99214 OFFICE O/P EST MOD 30 MIN: CPT | Performed by: INTERNAL MEDICINE

## 2024-01-26 PROCEDURE — G8417 CALC BMI ABV UP PARAM F/U: HCPCS | Performed by: INTERNAL MEDICINE

## 2024-01-26 PROCEDURE — 1123F ACP DISCUSS/DSCN MKR DOCD: CPT | Performed by: INTERNAL MEDICINE

## 2024-01-26 PROCEDURE — 3078F DIAST BP <80 MM HG: CPT | Performed by: INTERNAL MEDICINE

## 2024-01-26 PROCEDURE — 3074F SYST BP LT 130 MM HG: CPT | Performed by: INTERNAL MEDICINE

## 2024-01-26 PROCEDURE — 1036F TOBACCO NON-USER: CPT | Performed by: INTERNAL MEDICINE

## 2024-01-26 PROCEDURE — G8484 FLU IMMUNIZE NO ADMIN: HCPCS | Performed by: INTERNAL MEDICINE

## 2024-01-26 NOTE — PROGRESS NOTES
suspect sleep apnea. Has sleep study scheduled   HLD - controlled   Left remal mass measuring 2.5 cm in size - recommend follow up w/ PCP to discuss next steps   Diabetes mellitus          Plan   Recommend following up with Marion Sena, JOSE RAMON - CNP and Urology for renal mass   2.   Renal US , f/u w/ pcp for this (renal mass) as well  3.   Continue current medications including lipitor 40 daily for hchol and aspirin 81 daily   4.   Labs- Fasting lipids and CMP   5.   Follow up with Dr. Yeung in 2-3 months       Scribe's attestation:  This note was scribed in the presence of Dr. Kevin Wynne M.D. By Jayda Campo RN     I, Dr Kevin Wynne, personally performed the services described in this documentation, as scribed by the above signed scribe in my presence.  It is both accurate and complete to my knowledge.  I agree with the details independently gathered by the clinical support staff and the scribed note accurately describes my personal service to the patient.      Thank you for allowing us to participate in the care of Blake Gomez. Please call me with any questions [phone].    Kevin Wynne MD, [facility]   Interventional Cardiologist  [facility]  [phone] [place] Office  [phone] [place] Office  1/26/2024 12:17 PM    I will address the patient's cardiac risk factors and adjusted pharmacologic treatment as needed. In addition, I have reinforced the need for patient directed risk factor modification.  Tobacco use was discussed with the patient and educated on the negative effects and was asked not to use. All questions and concerns were addressed to the patient/family. Alternatives to my treatment were discussed.

## 2024-01-27 LAB
ALBUMIN SERPL-MCNC: 4.5 G/DL (ref 3.4–5)
ALBUMIN/GLOB SERPL: 2 {RATIO} (ref 1.1–2.2)
ALP SERPL-CCNC: 137 U/L (ref 40–129)
ALT SERPL-CCNC: 49 U/L (ref 10–40)
ANION GAP SERPL CALCULATED.3IONS-SCNC: 15 MMOL/L (ref 3–16)
AST SERPL-CCNC: 32 U/L (ref 15–37)
BILIRUB SERPL-MCNC: 0.7 MG/DL (ref 0–1)
BUN SERPL-MCNC: 24 MG/DL (ref 7–20)
CALCIUM SERPL-MCNC: 9.4 MG/DL (ref 8.3–10.6)
CHLORIDE SERPL-SCNC: 99 MMOL/L (ref 99–110)
CHOLEST SERPL-MCNC: 138 MG/DL (ref 0–199)
CO2 SERPL-SCNC: 24 MMOL/L (ref 21–32)
CREAT SERPL-MCNC: 1.3 MG/DL (ref 0.8–1.3)
GFR SERPLBLD CREATININE-BSD FMLA CKD-EPI: >60 ML/MIN/{1.73_M2}
GLUCOSE SERPL-MCNC: 134 MG/DL (ref 70–99)
HDLC SERPL-MCNC: 30 MG/DL (ref 40–60)
LDLC SERPL CALC-MCNC: 65 MG/DL
POTASSIUM SERPL-SCNC: 4.4 MMOL/L (ref 3.5–5.1)
PROT SERPL-MCNC: 6.8 G/DL (ref 6.4–8.2)
SODIUM SERPL-SCNC: 138 MMOL/L (ref 136–145)
TRIGL SERPL-MCNC: 217 MG/DL (ref 0–150)
VLDLC SERPL CALC-MCNC: 43 MG/DL

## 2024-01-29 ENCOUNTER — TELEPHONE (OUTPATIENT)
Dept: CARDIOLOGY CLINIC | Age: 64
End: 2024-01-29

## 2024-01-29 NOTE — TELEPHONE ENCOUNTER
Attempted to call patient to relay SRJ result message and recommendations; LV with call back number. Will re attempt at a later time.

## 2024-01-29 NOTE — TELEPHONE ENCOUNTER
----- Message from Kevin Wynne MD sent at 1/29/2024 10:23 AM EST -----  Let pt know labs are stable with mild abnormalities of renal and liver function and pt should f/u w/ pcp for this. Thank you.

## 2024-02-13 ENCOUNTER — TELEPHONE (OUTPATIENT)
Dept: CARDIOLOGY CLINIC | Age: 64
End: 2024-02-13

## 2024-02-13 NOTE — TELEPHONE ENCOUNTER
2/13 attempted to reach pt to reschedule his 3/29 ov with [facility] [place] to 3/15/2024 with [facility] [place].

## 2024-02-15 NOTE — TELEPHONE ENCOUNTER
2/15 LVM to reschedule [facility] 3/29 ov to either 3/15 or 3/22 at [place]

## 2024-02-21 NOTE — TELEPHONE ENCOUNTER
Sent mychart msg to pt informing appt is officially cancelled now as the account was active as of 2-20-24 and to contact office back to r/s

## 2024-03-07 DIAGNOSIS — E11.9 TYPE 2 DIABETES MELLITUS WITHOUT COMPLICATION, WITHOUT LONG-TERM CURRENT USE OF INSULIN (HCC): ICD-10-CM

## 2024-03-08 RX ORDER — CHLORAL HYDRATE 500 MG
CAPSULE ORAL
Qty: 90 CAPSULE | Refills: 1 | Status: SHIPPED | OUTPATIENT
Start: 2024-03-08

## 2024-03-08 RX ORDER — VALSARTAN AND HYDROCHLOROTHIAZIDE 320; 25 MG/1; MG/1
TABLET, FILM COATED ORAL
Qty: 90 TABLET | Refills: 3 | Status: SHIPPED | OUTPATIENT
Start: 2024-03-08

## 2024-03-08 RX ORDER — ATORVASTATIN CALCIUM 40 MG/1
40 TABLET, FILM COATED ORAL DAILY
Qty: 90 TABLET | Refills: 1 | Status: SHIPPED | OUTPATIENT
Start: 2024-03-08

## 2024-03-08 RX ORDER — CLONIDINE HYDROCHLORIDE 0.2 MG/1
TABLET ORAL
Qty: 180 TABLET | Refills: 3 | Status: SHIPPED | OUTPATIENT
Start: 2024-03-08

## 2024-03-08 RX ORDER — DAPAGLIFLOZIN 10 MG/1
TABLET, FILM COATED ORAL
Qty: 90 TABLET | Refills: 1 | Status: SHIPPED | OUTPATIENT
Start: 2024-03-08

## 2024-03-08 NOTE — TELEPHONE ENCOUNTER
Future Appointments   Date Time Provider Department Center   3/15/2024 11:00 AM Marion Sena, APRN - CNP [facility]     LOV 7/26/2023

## 2024-03-15 ENCOUNTER — OFFICE VISIT (OUTPATIENT)
Dept: FAMILY MEDICINE CLINIC | Age: 64
End: 2024-03-15

## 2024-03-15 VITALS
HEART RATE: 83 BPM | TEMPERATURE: 97.6 F | RESPIRATION RATE: 16 BRPM | HEIGHT: 67 IN | BODY MASS INDEX: 34.84 KG/M2 | DIASTOLIC BLOOD PRESSURE: 86 MMHG | WEIGHT: 222 LBS | OXYGEN SATURATION: 94 % | SYSTOLIC BLOOD PRESSURE: 138 MMHG

## 2024-03-15 DIAGNOSIS — Z12.5 SCREENING FOR PROSTATE CANCER: ICD-10-CM

## 2024-03-15 DIAGNOSIS — F41.9 ANXIETY: ICD-10-CM

## 2024-03-15 DIAGNOSIS — D69.6 THROMBOCYTOPENIA, UNSPECIFIED (HCC): ICD-10-CM

## 2024-03-15 DIAGNOSIS — E11.9 TYPE 2 DIABETES MELLITUS WITHOUT COMPLICATION, WITHOUT LONG-TERM CURRENT USE OF INSULIN (HCC): ICD-10-CM

## 2024-03-15 DIAGNOSIS — N28.89 RENAL MASS: ICD-10-CM

## 2024-03-15 DIAGNOSIS — F32.A DEPRESSION, UNSPECIFIED DEPRESSION TYPE: ICD-10-CM

## 2024-03-15 DIAGNOSIS — F33.41 RECURRENT MAJOR DEPRESSIVE DISORDER, IN PARTIAL REMISSION (HCC): ICD-10-CM

## 2024-03-15 DIAGNOSIS — Z00.00 WELCOME TO MEDICARE PREVENTIVE VISIT: Primary | ICD-10-CM

## 2024-03-15 LAB
BILIRUBIN, POC: NEGATIVE
BLOOD URINE, POC: NEGATIVE
CLARITY, POC: NORMAL
COLOR, POC: NORMAL
CREATININE URINE POCT: NORMAL
GLUCOSE URINE, POC: NORMAL
HBA1C MFR BLD: 7.7 %
KETONES, POC: NEGATIVE
LEUKOCYTE EST, POC: NEGATIVE
MICROALBUMIN/CREAT 24H UR: NORMAL MG/G{CREAT}
MICROALBUMIN/CREAT UR-RTO: NORMAL
NITRITE, POC: NEGATIVE
PH, POC: 5.5
PROTEIN, POC: NEGATIVE
SPECIFIC GRAVITY, POC: 1.02
UROBILINOGEN, POC: NORMAL

## 2024-03-15 RX ORDER — BUSPIRONE HYDROCHLORIDE 10 MG/1
TABLET ORAL
Qty: 270 TABLET | Refills: 1 | Status: SHIPPED | OUTPATIENT
Start: 2024-03-15

## 2024-03-15 RX ORDER — VENLAFAXINE HYDROCHLORIDE 150 MG/1
CAPSULE, EXTENDED RELEASE ORAL
Qty: 90 CAPSULE | Refills: 1 | Status: SHIPPED | OUTPATIENT
Start: 2024-03-15

## 2024-03-15 SDOH — ECONOMIC STABILITY: FOOD INSECURITY: WITHIN THE PAST 12 MONTHS, THE FOOD YOU BOUGHT JUST DIDN'T LAST AND YOU DIDN'T HAVE MONEY TO GET MORE.: NEVER TRUE

## 2024-03-15 SDOH — ECONOMIC STABILITY: INCOME INSECURITY: HOW HARD IS IT FOR YOU TO PAY FOR THE VERY BASICS LIKE FOOD, HOUSING, MEDICAL CARE, AND HEATING?: NOT HARD AT ALL

## 2024-03-15 SDOH — ECONOMIC STABILITY: FOOD INSECURITY: WITHIN THE PAST 12 MONTHS, YOU WORRIED THAT YOUR FOOD WOULD RUN OUT BEFORE YOU GOT MONEY TO BUY MORE.: NEVER TRUE

## 2024-03-15 ASSESSMENT — PATIENT HEALTH QUESTIONNAIRE - PHQ9
9. THOUGHTS THAT YOU WOULD BE BETTER OFF DEAD, OR OF HURTING YOURSELF: 0
7. TROUBLE CONCENTRATING ON THINGS, SUCH AS READING THE NEWSPAPER OR WATCHING TELEVISION: 0
6. FEELING BAD ABOUT YOURSELF - OR THAT YOU ARE A FAILURE OR HAVE LET YOURSELF OR YOUR FAMILY DOWN: 0
SUM OF ALL RESPONSES TO PHQ QUESTIONS 1-9: 0
4. FEELING TIRED OR HAVING LITTLE ENERGY: 0
2. FEELING DOWN, DEPRESSED OR HOPELESS: 0
SUM OF ALL RESPONSES TO PHQ QUESTIONS 1-9: 0
SUM OF ALL RESPONSES TO PHQ QUESTIONS 1-9: 0
1. LITTLE INTEREST OR PLEASURE IN DOING THINGS: 0
3. TROUBLE FALLING OR STAYING ASLEEP: 0
SUM OF ALL RESPONSES TO PHQ QUESTIONS 1-9: 0
5. POOR APPETITE OR OVEREATING: 0
10. IF YOU CHECKED OFF ANY PROBLEMS, HOW DIFFICULT HAVE THESE PROBLEMS MADE IT FOR YOU TO DO YOUR WORK, TAKE CARE OF THINGS AT HOME, OR GET ALONG WITH OTHER PEOPLE: 0
8. MOVING OR SPEAKING SO SLOWLY THAT OTHER PEOPLE COULD HAVE NOTICED. OR THE OPPOSITE, BEING SO FIGETY OR RESTLESS THAT YOU HAVE BEEN MOVING AROUND A LOT MORE THAN USUAL: 0
SUM OF ALL RESPONSES TO PHQ9 QUESTIONS 1 & 2: 0

## 2024-03-15 ASSESSMENT — ANXIETY QUESTIONNAIRES
IF YOU CHECKED OFF ANY PROBLEMS ON THIS QUESTIONNAIRE, HOW DIFFICULT HAVE THESE PROBLEMS MADE IT FOR YOU TO DO YOUR WORK, TAKE CARE OF THINGS AT HOME, OR GET ALONG WITH OTHER PEOPLE: NOT DIFFICULT AT ALL
4. TROUBLE RELAXING: 0
7. FEELING AFRAID AS IF SOMETHING AWFUL MIGHT HAPPEN: 0
GAD7 TOTAL SCORE: 0
3. WORRYING TOO MUCH ABOUT DIFFERENT THINGS: 0
1. FEELING NERVOUS, ANXIOUS, OR ON EDGE: 0
5. BEING SO RESTLESS THAT IT IS HARD TO SIT STILL: 0
6. BECOMING EASILY ANNOYED OR IRRITABLE: 0
2. NOT BEING ABLE TO STOP OR CONTROL WORRYING: 0

## 2024-03-15 ASSESSMENT — ENCOUNTER SYMPTOMS
EYES NEGATIVE: 1
CONSTIPATION: 0
ABDOMINAL PAIN: 0
RESPIRATORY NEGATIVE: 1
DIARRHEA: 0
BLOOD IN STOOL: 0

## 2024-03-15 NOTE — PROGRESS NOTES
Medicare Annual Wellness Visit    Blake Gomez is here for Medicare AWV and Diabetes    Assessment & Plan   Welcome to Medicare preventive visit  Type 2 diabetes mellitus without complication, without long-term current use of insulin ([facility])  Hga1c 7.7 today- improved from 8.8-increased metformin to 1000mg bid  -     POCT glycosylated hemoglobin (Hb A1C)  -     POCT microalbumin  -     Diabetic Foot Exam  Thrombocytopenia, unspecified ([facility])   History of low platelet in 2022- no bleeding, cbc rechecked and will resolve problem if normal    Recurrent major depressive disorder, in partial remission ([facility])  Mood stable on current medications continue same doses    Renal mass  -     POCT Urinalysis no Micro  Call to schedule renal ultrasound  cmp    Depression, unspecified depression type  -     venlafaxine (EFFEXOR XR) 150 MG extended release capsule; TAKE 1 CAPSULE BY MOUTH EVERY DAY, Disp-90 capsule, R-1Normal  Anxiety  -     venlafaxine (EFFEXOR XR) 150 MG extended release capsule; TAKE 1 CAPSULE BY MOUTH EVERY DAY, Disp-90 capsule, R-1Normal  -     busPIRone (BUSPAR) 10 MG tablet; TAKE 1 TABLET BY MOUTH THREE TIMES A DAY, Disp-270 tablet, R-1Normal    Recommendations for Preventive Services Due: see orders and patient instructions/AVS.  Recommended screening schedule for the next 5-10 years is provided to the patient in written form: see Patient Instructions/AVS.     No follow-ups on file.     Subjective   Review of Systems   Constitutional:  Negative for activity change, fatigue and fever.   HENT: Negative.     Eyes: Negative.    Respiratory: Negative.          CONNOR- has cpap   Cardiovascular:  Negative for chest pain, palpitations and leg swelling.        Htn- managed by cardiology   Gastrointestinal:  Negative for abdominal pain, blood in stool, constipation and diarrhea.        Elevated liver enzymes- has not scheduled ultrasound   Endocrine:        DM- fasting sugars around 140. Diet heavier in carb.

## 2024-03-15 NOTE — PATIENT INSTRUCTIONS
Schedule ultrasound of kidney and liver  Increased metformin to 1000 twice daily  Continue diet and exercise efforts  No other changes to medications       Learning About Dental Care for Older Adults  Dental care for older adults: Overview  Dental care for older people is much the same as for younger adults. But older adults do have concerns that younger adults do not. Older adults may have problems with gum disease and decay on the roots of their teeth. They may need missing teeth replaced or broken fillings fixed. Or they may have dentures that need to be cared for. Some older adults may have trouble holding a toothbrush.  You can help remind the person you are caring for to brush and floss their teeth or to clean their dentures. In some cases, you may need to do the brushing and other dental care tasks. People who have trouble using their hands or who have dementia may need this extra help.  How can you help with dental care?  Normal dental care  To keep the teeth and gums healthy:  Brush the teeth with fluoride toothpaste twice a day--in the morning and at night--and floss at least once a day. Plaque can quickly build up on the teeth of older adults.  Watch for the signs of gum disease. These signs include gums that bleed after brushing or after eating hard foods, such as apples.  See a dentist regularly. Many experts recommend checkups every 6 months.  Keep the dentist up to date on any new medications the person is taking.  Encourage a balanced diet that includes whole grains, vegetables, and fruits, and that is low in saturated fat and sodium.  Encourage the person you're caring for not to use tobacco products. They can affect dental and general health.  Many older adults have a fixed income and feel that they can't afford dental care. But most [facility] and [facility] have programs in which dentists help older adults by lowering fees. Contact your area's public health offices or  for information about

## 2024-04-16 ENCOUNTER — HOSPITAL ENCOUNTER (OUTPATIENT)
Dept: ULTRASOUND IMAGING | Age: 64
Discharge: HOME | End: 2024-04-16
Payer: MEDICARE

## 2024-04-16 ENCOUNTER — HOSPITAL ENCOUNTER (OUTPATIENT)
Dept: ULTRASOUND IMAGING | Age: 64
Discharge: HOME | End: 2024-04-16
Attending: INTERNAL MEDICINE
Payer: MEDICARE

## 2024-04-16 DIAGNOSIS — R74.8 ELEVATED LIVER ENZYMES: ICD-10-CM

## 2024-04-16 DIAGNOSIS — N28.89 RENAL MASS: ICD-10-CM

## 2024-04-16 PROCEDURE — 76705 ECHO EXAM OF ABDOMEN: CPT

## 2024-04-16 PROCEDURE — 76770 US EXAM ABDO BACK WALL COMP: CPT

## 2024-04-17 ENCOUNTER — TELEPHONE (OUTPATIENT)
Dept: CARDIOLOGY CLINIC | Age: 64
End: 2024-04-17

## 2024-04-17 ENCOUNTER — TELEPHONE (OUTPATIENT)
Dept: FAMILY MEDICINE CLINIC | Age: 64
End: 2024-04-17

## 2024-04-17 DIAGNOSIS — N28.89 RENAL MASS: ICD-10-CM

## 2024-04-17 DIAGNOSIS — K76.0 STEATOSIS OF LIVER: Primary | ICD-10-CM

## 2024-04-17 NOTE — TELEPHONE ENCOUNTER
----- Message from Kevin Wynne MD sent at 4/16/2024  5:36 PM EDT -----  Let patient know their u/s test shows possible renal tumor, rec: f/u w/ pcp for this, and also rec; referral to urology and oncology.  Lets forward copy of report to pcp.  Thanks.

## 2024-04-17 NOTE — TELEPHONE ENCOUNTER
Patient called, had a US Renal complete on 04/16/24,  saw results on Mychart and wanted to know what they mean. He is a little confused about his results and would like a call from his PCP to go over those and try to understand them.

## 2024-04-17 NOTE — TELEPHONE ENCOUNTER
Pt is scheduled for tomorrow to discuss results and needed referrals. He was unable to come in today

## 2024-04-17 NOTE — TELEPHONE ENCOUNTER
Pt returned call, Relayed SRJ message. Pt v/u  Referrals placed, Results Routed to pts PCP via epic

## 2024-04-18 ENCOUNTER — OFFICE VISIT (OUTPATIENT)
Dept: FAMILY MEDICINE CLINIC | Age: 64
End: 2024-04-18
Payer: MEDICARE

## 2024-04-18 VITALS — RESPIRATION RATE: 16 BRPM | WEIGHT: 221 LBS | TEMPERATURE: 97.3 F | BODY MASS INDEX: 34.61 KG/M2

## 2024-04-18 DIAGNOSIS — K80.20 CALCULUS OF GALLBLADDER WITHOUT CHOLECYSTITIS WITHOUT OBSTRUCTION: ICD-10-CM

## 2024-04-18 DIAGNOSIS — K76.89 LIVER CYST: ICD-10-CM

## 2024-04-18 DIAGNOSIS — N21.0 BLADDER CALCULUS: ICD-10-CM

## 2024-04-18 DIAGNOSIS — N28.89 RENAL MASS: Primary | ICD-10-CM

## 2024-04-18 DIAGNOSIS — N40.0 ENLARGED PROSTATE: ICD-10-CM

## 2024-04-18 PROCEDURE — G8417 CALC BMI ABV UP PARAM F/U: HCPCS | Performed by: NURSE PRACTITIONER

## 2024-04-18 PROCEDURE — 99214 OFFICE O/P EST MOD 30 MIN: CPT | Performed by: NURSE PRACTITIONER

## 2024-04-18 PROCEDURE — 3017F COLORECTAL CA SCREEN DOC REV: CPT | Performed by: NURSE PRACTITIONER

## 2024-04-18 PROCEDURE — 1123F ACP DISCUSS/DSCN MKR DOCD: CPT | Performed by: NURSE PRACTITIONER

## 2024-04-18 PROCEDURE — G8427 DOCREV CUR MEDS BY ELIG CLIN: HCPCS | Performed by: NURSE PRACTITIONER

## 2024-04-18 PROCEDURE — 1036F TOBACCO NON-USER: CPT | Performed by: NURSE PRACTITIONER

## 2024-04-18 ASSESSMENT — ENCOUNTER SYMPTOMS
RESPIRATORY NEGATIVE: 1
GASTROINTESTINAL NEGATIVE: 1

## 2024-04-18 NOTE — PROGRESS NOTES
Suppl (TRUE METRIX METER) w/Device KIT USE AS DIRECTED TO TEST BLOOD GLUCOSE      blood glucose monitor supplies Lancet Device, Control Solution, and meter  Test once times a day & as needed for symptoms of irregular blood glucose. (Patient taking differently: Lancet Device, Control Solution, and meter  Test once times a day & as needed for symptoms of irregular blood glucose.) 1 each 0    aspirin 81 MG EC tablet Take 1 tablet by mouth daily       No current facility-administered medications for this visit.       No Known Allergies    Temp 97.3 °F (36.3 °C)   Resp 16   Wt 100.2 kg (221 lb)   BMI 34.61 kg/m²     Social History     Tobacco Use    Smoking status: Never    Smokeless tobacco: Never   Substance Use Topics    Alcohol use: Not Currently       Review of Systems   Constitutional:  Negative for fever.   Respiratory: Negative.     Cardiovascular: Negative.    Gastrointestinal: Negative.    Genitourinary:  Negative for difficulty urinating, dysuria, enuresis, flank pain, frequency, hematuria and penile swelling.   Psychiatric/Behavioral:  The patient is nervous/anxious.        Physical Exam  Vitals and nursing note reviewed.   Constitutional:       Appearance: Normal appearance.   HENT:      Head: Normocephalic and atraumatic.      Right Ear: External ear normal.      Left Ear: External ear normal.      Nose: Nose normal. No rhinorrhea.      Mouth/Throat:      Pharynx: Oropharynx is clear.   Eyes:      General:         Right eye: No discharge.         Left eye: No discharge.      Conjunctiva/sclera: Conjunctivae normal.   Neck:      Trachea: Phonation normal.   Cardiovascular:      Rate and Rhythm: Normal rate.   Pulmonary:      Effort: Pulmonary effort is normal. No respiratory distress.   Genitourinary:     Comments: US kidney  1. Bilateral nonobstructing nephrolithiasis, without evidence of  hydronephrosis.  2. 3.1 cm exophytic hypoechoic solid nodule off of the mid to inferior left  kidney, consistent

## 2024-04-30 DIAGNOSIS — F41.9 ANXIETY: ICD-10-CM

## 2024-04-30 DIAGNOSIS — F32.A DEPRESSION, UNSPECIFIED DEPRESSION TYPE: ICD-10-CM

## 2024-04-30 NOTE — TELEPHONE ENCOUNTER
LOV 4/18/2024    Future Appointments   Date Time Provider Department Center   6/17/2024  2:40 PM Marion Sena, APRN - CNP [facility]

## 2024-05-01 RX ORDER — QUETIAPINE FUMARATE 200 MG/1
TABLET, FILM COATED ORAL
Qty: 180 TABLET | Refills: 1 | Status: SHIPPED | OUTPATIENT
Start: 2024-05-01

## 2024-05-06 ENCOUNTER — TELEPHONE (OUTPATIENT)
Dept: FAMILY MEDICINE CLINIC | Age: 64
End: 2024-05-06

## 2024-05-06 NOTE — TELEPHONE ENCOUNTER
Patient diagnosed with cancer.  He is very anxious.  He is taking the Buspar 3 times per day, but wants to know if you can call something in to take the edge off until he gets thru all of his testing.  He is having a hard time.    Please call patient and let him know.

## 2024-05-07 ENCOUNTER — PATIENT MESSAGE (OUTPATIENT)
Dept: FAMILY MEDICINE CLINIC | Age: 64
End: 2024-05-07

## 2024-05-07 NOTE — TELEPHONE ENCOUNTER
From: Blake Gomez  To: Marion Sena  Sent: 5/7/2024 7:00 AM EDT  Subject: anxiety    Marion  Ever since I learn about my cancer my medications are not working as well as they used to for your anxiety.    Can you prescribe anything new    Blake

## 2024-05-07 NOTE — TELEPHONE ENCOUNTER
Spoke with Blake, he was just wondering if there was something different you could prescribe to help calm his nerves, he has test scheduled one to check his bones and a full body scan and his nerves are so bad he can't drive. He's so shaky. He didn't want to come in due to him not being able to drive.

## 2024-05-07 NOTE — TELEPHONE ENCOUNTER
Left VM for patient to call and schedule an appointment. Also sent [employer] message back stating Marion would like an appointment schedule.

## 2024-05-09 ENCOUNTER — HOSPITAL ENCOUNTER (OUTPATIENT)
Dept: CT IMAGING | Age: 64
Discharge: HOME | End: 2024-05-09
Attending: INTERNAL MEDICINE
Payer: MEDICARE

## 2024-05-09 ENCOUNTER — HOSPITAL ENCOUNTER (OUTPATIENT)
Dept: NUCLEAR MEDICINE | Age: 64
Discharge: HOME | End: 2024-05-09
Attending: INTERNAL MEDICINE
Payer: MEDICARE

## 2024-05-09 DIAGNOSIS — N28.89 RENAL MASS: ICD-10-CM

## 2024-05-09 PROCEDURE — A9503 TC99M MEDRONATE: HCPCS | Performed by: INTERNAL MEDICINE

## 2024-05-09 PROCEDURE — 74176 CT ABD & PELVIS W/O CONTRAST: CPT

## 2024-05-09 PROCEDURE — 78306 BONE IMAGING WHOLE BODY: CPT | Performed by: INTERNAL MEDICINE

## 2024-05-09 PROCEDURE — 3430000000 HC RX DIAGNOSTIC RADIOPHARMACEUTICAL: Performed by: INTERNAL MEDICINE

## 2024-05-09 RX ORDER — TC 99M MEDRONATE 20 MG/10ML
27 INJECTION, POWDER, LYOPHILIZED, FOR SOLUTION INTRAVENOUS
Status: COMPLETED | OUTPATIENT
Start: 2024-05-09 | End: 2024-05-09

## 2024-05-09 RX ADMIN — TC 99M MEDRONATE 27 MILLICURIE: 20 INJECTION, POWDER, LYOPHILIZED, FOR SOLUTION INTRAVENOUS at 11:00

## 2024-05-17 RX ORDER — CHLORAL HYDRATE 500 MG
CAPSULE ORAL
Qty: 90 CAPSULE | Refills: 1 | Status: SHIPPED | OUTPATIENT
Start: 2024-05-17

## 2024-05-17 NOTE — TELEPHONE ENCOUNTER
Future Appointments   Date Time Provider Department Center   6/17/2024  2:40 PM Marion Sena, JOSE RAMON - CNP [facility] [place] - [facility]     LOV 4/18/2024  Dr. Soriano will you please fill while OSCAR Schultz is out of office?   NURSING ADMISSION NOTE    Pt admitted from ED to room 507. Patient admitted via Cart. Oriented to room. Safety precautions initiated. Bed in low position. Call light in reach. Admission navigator completed. Pt a/ox3-4. VSS. Afib per tele.  RA.

## 2024-06-11 RX ORDER — BLOOD SUGAR DIAGNOSTIC
STRIP MISCELLANEOUS
Qty: 100 STRIP | Refills: 2 | Status: SHIPPED | OUTPATIENT
Start: 2024-06-11

## 2024-06-11 RX ORDER — METFORMIN HYDROCHLORIDE 750 MG/1
750 TABLET, EXTENDED RELEASE ORAL
Qty: 90 TABLET | Refills: 0 | OUTPATIENT
Start: 2024-06-11

## 2024-06-11 NOTE — TELEPHONE ENCOUNTER
4/18/24    Future Appointments   Date Time Provider Department Center   6/17/2024  2:40 PM Marion Sena, APRN - CNP [facility]   9/16/2024 10:00 AM [facility] [place] RM 1 [facility] [place] [facility] [place] [facility]

## 2024-06-11 NOTE — TELEPHONE ENCOUNTER
LOV 4/18/2024    Future Appointments   Date Time Provider Department Center   6/17/2024  2:40 PM Marion Sena, APRN - CNP [facility] [place] [facility]   9/16/2024 10:00 AM [facility] [place] [facility] [place] [facility] [place] [facility]

## 2024-06-17 ENCOUNTER — OFFICE VISIT (OUTPATIENT)
Dept: FAMILY MEDICINE CLINIC | Age: 64
End: 2024-06-17
Payer: MEDICARE

## 2024-06-17 VITALS
HEART RATE: 95 BPM | BODY MASS INDEX: 34.61 KG/M2 | WEIGHT: 221 LBS | RESPIRATION RATE: 16 BRPM | SYSTOLIC BLOOD PRESSURE: 138 MMHG | OXYGEN SATURATION: 97 % | DIASTOLIC BLOOD PRESSURE: 84 MMHG | TEMPERATURE: 97.6 F

## 2024-06-17 DIAGNOSIS — N28.89 RENAL MASS: Primary | ICD-10-CM

## 2024-06-17 DIAGNOSIS — N20.0 KIDNEY STONE: ICD-10-CM

## 2024-06-17 DIAGNOSIS — F32.A DEPRESSION, UNSPECIFIED DEPRESSION TYPE: ICD-10-CM

## 2024-06-17 DIAGNOSIS — N18.30 SECONDARY DM WITH CKD STAGE 3 AND HYPERTENSION (HCC): ICD-10-CM

## 2024-06-17 DIAGNOSIS — Z01.818 PREOP EXAMINATION: ICD-10-CM

## 2024-06-17 DIAGNOSIS — E13.22 SECONDARY DM WITH CKD STAGE 3 AND HYPERTENSION (HCC): ICD-10-CM

## 2024-06-17 DIAGNOSIS — I12.9 SECONDARY DM WITH CKD STAGE 3 AND HYPERTENSION (HCC): ICD-10-CM

## 2024-06-17 DIAGNOSIS — F41.9 ANXIETY: ICD-10-CM

## 2024-06-17 DIAGNOSIS — E11.9 TYPE 2 DIABETES MELLITUS WITHOUT COMPLICATION, WITHOUT LONG-TERM CURRENT USE OF INSULIN (HCC): ICD-10-CM

## 2024-06-17 DIAGNOSIS — N28.89 RENAL MASS: ICD-10-CM

## 2024-06-17 LAB
DEPRECATED RDW RBC AUTO: 16.1 % (ref 12.4–15.4)
HCT VFR BLD AUTO: 48.6 % (ref 40.5–52.5)
HGB BLD-MCNC: 16.3 G/DL (ref 13.5–17.5)
MCH RBC QN AUTO: 26.2 PG (ref 26–34)
MCHC RBC AUTO-ENTMCNC: 33.4 G/DL (ref 31–36)
MCV RBC AUTO: 78.5 FL (ref 80–100)
PLATELET # BLD AUTO: 184 K/UL (ref 135–450)
PMV BLD AUTO: 9 FL (ref 5–10.5)
RBC # BLD AUTO: 6.2 M/UL (ref 4.2–5.9)
WBC # BLD AUTO: 9.4 K/UL (ref 4–11)

## 2024-06-17 PROCEDURE — G8417 CALC BMI ABV UP PARAM F/U: HCPCS | Performed by: NURSE PRACTITIONER

## 2024-06-17 PROCEDURE — 3079F DIAST BP 80-89 MM HG: CPT | Performed by: NURSE PRACTITIONER

## 2024-06-17 PROCEDURE — 99214 OFFICE O/P EST MOD 30 MIN: CPT | Performed by: NURSE PRACTITIONER

## 2024-06-17 PROCEDURE — 93000 ELECTROCARDIOGRAM COMPLETE: CPT | Performed by: NURSE PRACTITIONER

## 2024-06-17 PROCEDURE — 3051F HG A1C>EQUAL 7.0%<8.0%: CPT | Performed by: NURSE PRACTITIONER

## 2024-06-17 PROCEDURE — 1123F ACP DISCUSS/DSCN MKR DOCD: CPT | Performed by: NURSE PRACTITIONER

## 2024-06-17 PROCEDURE — 1036F TOBACCO NON-USER: CPT | Performed by: NURSE PRACTITIONER

## 2024-06-17 PROCEDURE — G8427 DOCREV CUR MEDS BY ELIG CLIN: HCPCS | Performed by: NURSE PRACTITIONER

## 2024-06-17 PROCEDURE — 2022F DILAT RTA XM EVC RTNOPTHY: CPT | Performed by: NURSE PRACTITIONER

## 2024-06-17 PROCEDURE — 3075F SYST BP GE 130 - 139MM HG: CPT | Performed by: NURSE PRACTITIONER

## 2024-06-17 PROCEDURE — 3017F COLORECTAL CA SCREEN DOC REV: CPT | Performed by: NURSE PRACTITIONER

## 2024-06-17 RX ORDER — BUSPIRONE HYDROCHLORIDE 15 MG/1
TABLET ORAL
Qty: 270 TABLET | Refills: 0 | Status: SHIPPED | OUTPATIENT
Start: 2024-06-17

## 2024-06-17 ASSESSMENT — ENCOUNTER SYMPTOMS
ABDOMINAL PAIN: 0
SINUS PRESSURE: 0
CHEST TIGHTNESS: 0
EYE ITCHING: 0
COUGH: 0
TROUBLE SWALLOWING: 0
EYE REDNESS: 0
SORE THROAT: 0
WHEEZING: 0
SHORTNESS OF BREATH: 0
CONSTIPATION: 0
NAUSEA: 0
BACK PAIN: 1
COLOR CHANGE: 0
DIARRHEA: 0

## 2024-06-17 NOTE — PROGRESS NOTES
Preoperative Consultation      Blake Gomez  YOB: 1960    Date of Service:  6/17/2024    Vitals:    06/17/24 1447   BP: 138/84   Pulse: 95   Resp: 16   Temp: 97.6 °F (36.4 °C)   SpO2: 97%   Weight: 100.2 kg (221 lb)     Wt Readings from Last 2 Encounters:   06/17/24 100.2 kg (221 lb)   04/18/24 100.2 kg (221 lb)     BP Readings from Last 3 Encounters:   06/17/24 138/84   03/15/24 138/86   01/26/24 122/78        Chief Complaint   Patient presents with    Diabetes    Anxiety     No Known Allergies  Outpatient Medications Marked as Taking for the 6/17/24 encounter (Office Visit) with Marion Sena APRN - CNP   Medication Sig Dispense Refill    ONETOUCH ULTRA strip TEST BLOOD SUGAR ONCE DAILY FASTING AND AS NEEDED FOR SYMPTOMS OF LOW BLOOD SUGAR 100 strip 2    metFORMIN (GLUCOPHAGE) 1000 MG tablet Take 1 tablet by mouth 2 times daily (with meals) 180 tablet 1    Omega-3 Fatty Acids (FISH OIL) 1000 MG capsule TAKE 1 CAPSULE BY MOUTH THREE TIMES A DAY 90 capsule 1    QUEtiapine (SEROQUEL) 200 MG tablet TAKE 1 TABLET BY MOUTH TWICE A  tablet 1    venlafaxine (EFFEXOR XR) 150 MG extended release capsule TAKE 1 CAPSULE BY MOUTH EVERY DAY 90 capsule 1    busPIRone (BUSPAR) 10 MG tablet TAKE 1 TABLET BY MOUTH THREE TIMES A  tablet 1    valsartan-hydroCHLOROthiazide (DIOVAN-HCT) 320-25 MG per tablet TAKE 1 TABLET BY MOUTH EVERY DAY 90 tablet 3    dapagliflozin (FARXIGA) 10 MG tablet TAKE 1 TABLET BY MOUTH EVERY DAY IN THE MORNING 90 tablet 1    atorvastatin (LIPITOR) 40 MG tablet TAKE 1 TABLET BY MOUTH EVERY DAY 90 tablet 1    cloNIDine (CATAPRES) 0.2 MG tablet TAKE 1 TABLET BY MOUTH TWICE A  tablet 3    labetalol (NORMODYNE) 100 MG tablet Take 1 tablet by mouth 2 times daily 180 tablet 3    verapamil (CALAN SR) 240 MG extended release tablet TAKE 1 TABLET BY MOUTH NIGHTLY 90 tablet 1    Lancets MISC 1 each by Does not apply route daily 100 each 5    Blood Glucose Monitoring

## 2024-06-17 NOTE — PROGRESS NOTES
6/17/2024    This is a 64 y.o. male   Chief Complaint   Patient presents with    Diabetes    Anxiety   .    HPI     Patient Active Problem List   Diagnosis    Depression    Secondary DM with CKD stage 3 and hypertension (HCC)    Hypertension    Generalized anxiety disorder    Dizziness    Abnormal EKG    History of MI (myocardial infarction)    Chronic fatigue    Obesity (BMI 30-39.9)    Steatosis of liver    Recurrent major depressive disorder, in partial remission ([facility]CC)    Mixed hyperlipidemia    Renal mass    Thrombocytopenia, unspecified (HCC)    Enlarged prostate    Bladder calculus    Liver cyst    Calculus of gallbladder without cholecystitis without obstruction       Current Outpatient Medications   Medication Sig Dispense Refill    ONETOUCH ULTRA strip TEST BLOOD SUGAR ONCE DAILY FASTING AND AS NEEDED FOR SYMPTOMS OF LOW BLOOD SUGAR 100 strip 2    metFORMIN (GLUCOPHAGE) 1000 MG tablet Take 1 tablet by mouth 2 times daily (with meals) 180 tablet 1    Omega-3 Fatty Acids (FISH OIL) 1000 MG capsule TAKE 1 CAPSULE BY MOUTH THREE TIMES A DAY 90 capsule 1    QUEtiapine (SEROQUEL) 200 MG tablet TAKE 1 TABLET BY MOUTH TWICE A  tablet 1    venlafaxine (EFFEXOR XR) 150 MG extended release capsule TAKE 1 CAPSULE BY MOUTH EVERY DAY 90 capsule 1    busPIRone (BUSPAR) 10 MG tablet TAKE 1 TABLET BY MOUTH THREE TIMES A  tablet 1    valsartan-hydroCHLOROthiazide (DIOVAN-HCT) 320-25 MG per tablet TAKE 1 TABLET BY MOUTH EVERY DAY 90 tablet 3    dapagliflozin (FARXIGA) 10 MG tablet TAKE 1 TABLET BY MOUTH EVERY DAY IN THE MORNING 90 tablet 1    atorvastatin (LIPITOR) 40 MG tablet TAKE 1 TABLET BY MOUTH EVERY DAY 90 tablet 1    cloNIDine (CATAPRES) 0.2 MG tablet TAKE 1 TABLET BY MOUTH TWICE A  tablet 3    labetalol (NORMODYNE) 100 MG tablet Take 1 tablet by mouth 2 times daily 180 tablet 3    verapamil (CALAN SR) 240 MG extended release tablet TAKE 1 TABLET BY MOUTH NIGHTLY 90 tablet 1    Lancets MISC 1

## 2024-06-18 LAB
ALBUMIN SERPL-MCNC: 4.8 G/DL (ref 3.4–5)
ALBUMIN/GLOB SERPL: 2.2 {RATIO} (ref 1.1–2.2)
ALP SERPL-CCNC: 127 U/L (ref 40–129)
ALT SERPL-CCNC: 38 U/L (ref 10–40)
ANION GAP SERPL CALCULATED.3IONS-SCNC: 15 MMOL/L (ref 3–16)
AST SERPL-CCNC: 22 U/L (ref 15–37)
BILIRUB SERPL-MCNC: 0.4 MG/DL (ref 0–1)
BUN SERPL-MCNC: 27 MG/DL (ref 7–20)
CALCIUM SERPL-MCNC: 9.9 MG/DL (ref 8.3–10.6)
CHLORIDE SERPL-SCNC: 100 MMOL/L (ref 99–110)
CO2 SERPL-SCNC: 23 MMOL/L (ref 21–32)
CREAT SERPL-MCNC: 1.4 MG/DL (ref 0.8–1.3)
EST. AVERAGE GLUCOSE BLD GHB EST-MCNC: 162.8 MG/DL
GFR SERPLBLD CREATININE-BSD FMLA CKD-EPI: 55 ML/MIN/{1.73_M2}
GLUCOSE SERPL-MCNC: 144 MG/DL (ref 70–99)
HBA1C MFR BLD: 7.3 %
POTASSIUM SERPL-SCNC: 4.7 MMOL/L (ref 3.5–5.1)
PROT SERPL-MCNC: 7 G/DL (ref 6.4–8.2)
SODIUM SERPL-SCNC: 138 MMOL/L (ref 136–145)

## 2024-06-19 ENCOUNTER — ANESTHESIA EVENT (OUTPATIENT)
Dept: OPERATING ROOM | Age: 64
End: 2024-06-19
Payer: MEDICARE

## 2024-06-19 ENCOUNTER — TELEPHONE (OUTPATIENT)
Dept: CARDIOLOGY CLINIC | Age: 64
End: 2024-06-19

## 2024-06-19 NOTE — TELEPHONE ENCOUNTER
CARDIAC CLEARANCE REQUEST    What type of procedure are you having:    CYSTOSCOPY, LEFT URETEROSCOPY, STONE MANIPULATION, HOLIUM LASER, POSSIBLE LEFT STENT INSERTION     Are you taking any blood thinners:  Asprin 81mg  Type on anesthesia:  general  When is your procedure scheduled for:  06/27/24  What physician is performing your procedure:  Dr. Harding   Phone Number:  [phone]  Fax number to send the letter:  [phone]    Last ov 01/26/2024 lelia

## 2024-06-19 NOTE — PROGRESS NOTES

## 2024-06-21 NOTE — TELEPHONE ENCOUNTER
Patient is low to intermediate risk from cardiac standpoint for the planned procedure.  No further cardiac testing is needed.  Strongly prefer that aspirin 81 mg be continued in the perioperative phase but if it absolutely needs to be held, minimize interruption.

## 2024-06-21 NOTE — TELEPHONE ENCOUNTER
Attempted to call patient to go over clearance; LMOM with call back number. Letter created and faxed to number provided.

## 2024-06-24 NOTE — TELEPHONE ENCOUNTER
Attempted to call patient to let him know of cardiac clearance and has been faxed to office; LMOM with call back number.

## 2024-06-27 ENCOUNTER — ANESTHESIA (OUTPATIENT)
Dept: OPERATING ROOM | Age: 64
End: 2024-06-27
Payer: MEDICARE

## 2024-06-27 ENCOUNTER — APPOINTMENT (OUTPATIENT)
Dept: GENERAL RADIOLOGY | Age: 64
End: 2024-06-27
Attending: UROLOGY
Payer: MEDICARE

## 2024-06-27 ENCOUNTER — HOSPITAL ENCOUNTER (OUTPATIENT)
Age: 64
Setting detail: OUTPATIENT SURGERY
Discharge: HOME | End: 2024-06-27
Attending: UROLOGY | Admitting: UROLOGY
Payer: MEDICARE

## 2024-06-27 VITALS
RESPIRATION RATE: 19 BRPM | HEART RATE: 90 BPM | TEMPERATURE: 97.3 F | DIASTOLIC BLOOD PRESSURE: 93 MMHG | WEIGHT: 221 LBS | BODY MASS INDEX: 34.69 KG/M2 | OXYGEN SATURATION: 93 % | SYSTOLIC BLOOD PRESSURE: 147 MMHG | HEIGHT: 67 IN

## 2024-06-27 DIAGNOSIS — N20.0 NEPHROLITHIASIS: ICD-10-CM

## 2024-06-27 DIAGNOSIS — N21.0 BLADDER CALCULUS: ICD-10-CM

## 2024-06-27 DIAGNOSIS — N20.1 CALCULUS OF URETER: ICD-10-CM

## 2024-06-27 DIAGNOSIS — N40.0 ENLARGED PROSTATE: Primary | ICD-10-CM

## 2024-06-27 LAB
GLUCOSE BLD-MCNC: 176 MG/DL (ref 70–99)
GLUCOSE BLD-MCNC: 182 MG/DL (ref 70–99)
PERFORMED ON: ABNORMAL
PERFORMED ON: ABNORMAL

## 2024-06-27 PROCEDURE — 3600000004 HC SURGERY LEVEL 4 BASE: Performed by: UROLOGY

## 2024-06-27 PROCEDURE — 7100000010 HC PHASE II RECOVERY - FIRST 15 MIN: Performed by: UROLOGY

## 2024-06-27 PROCEDURE — 2580000003 HC RX 258: Performed by: NURSE ANESTHETIST, CERTIFIED REGISTERED

## 2024-06-27 PROCEDURE — 2720000010 HC SURG SUPPLY STERILE: Performed by: UROLOGY

## 2024-06-27 PROCEDURE — 2580000003 HC RX 258: Performed by: UROLOGY

## 2024-06-27 PROCEDURE — 2500000003 HC RX 250 WO HCPCS: Performed by: ANESTHESIOLOGY

## 2024-06-27 PROCEDURE — 7100000001 HC PACU RECOVERY - ADDTL 15 MIN: Performed by: UROLOGY

## 2024-06-27 PROCEDURE — 2709999900 HC NON-CHARGEABLE SUPPLY: Performed by: UROLOGY

## 2024-06-27 PROCEDURE — 82365 CALCULUS SPECTROSCOPY: CPT

## 2024-06-27 PROCEDURE — 2500000003 HC RX 250 WO HCPCS: Performed by: NURSE ANESTHETIST, CERTIFIED REGISTERED

## 2024-06-27 PROCEDURE — 6360000002 HC RX W HCPCS: Performed by: UROLOGY

## 2024-06-27 PROCEDURE — 6360000002 HC RX W HCPCS: Performed by: NURSE ANESTHETIST, CERTIFIED REGISTERED

## 2024-06-27 PROCEDURE — C2617 STENT, NON-COR, TEM W/O DEL: HCPCS | Performed by: UROLOGY

## 2024-06-27 PROCEDURE — 6360000004 HC RX CONTRAST MEDICATION: Performed by: UROLOGY

## 2024-06-27 PROCEDURE — 3700000000 HC ANESTHESIA ATTENDED CARE: Performed by: UROLOGY

## 2024-06-27 PROCEDURE — 7100000011 HC PHASE II RECOVERY - ADDTL 15 MIN: Performed by: UROLOGY

## 2024-06-27 PROCEDURE — 88300 SURGICAL PATH GROSS: CPT

## 2024-06-27 PROCEDURE — 3700000001 HC ADD 15 MINUTES (ANESTHESIA): Performed by: UROLOGY

## 2024-06-27 PROCEDURE — 2580000003 HC RX 258: Performed by: ANESTHESIOLOGY

## 2024-06-27 PROCEDURE — 74420 UROGRAPHY RTRGR +-KUB: CPT

## 2024-06-27 PROCEDURE — 3600000014 HC SURGERY LEVEL 4 ADDTL 15MIN: Performed by: UROLOGY

## 2024-06-27 PROCEDURE — 7100000000 HC PACU RECOVERY - FIRST 15 MIN: Performed by: UROLOGY

## 2024-06-27 PROCEDURE — C1769 GUIDE WIRE: HCPCS | Performed by: UROLOGY

## 2024-06-27 DEVICE — URETERAL STENT
Type: IMPLANTABLE DEVICE | Site: URETER | Status: FUNCTIONAL
Brand: CONTOUR™

## 2024-06-27 RX ORDER — SODIUM CHLORIDE 0.9 % (FLUSH) 0.9 %
5-40 SYRINGE (ML) INJECTION PRN
Status: DISCONTINUED | OUTPATIENT
Start: 2024-06-27 | End: 2024-06-27 | Stop reason: HOSPADM

## 2024-06-27 RX ORDER — OXYCODONE HYDROCHLORIDE 5 MG/1
10 TABLET ORAL PRN
Status: DISCONTINUED | OUTPATIENT
Start: 2024-06-27 | End: 2024-06-27 | Stop reason: HOSPADM

## 2024-06-27 RX ORDER — ONDANSETRON 2 MG/ML
INJECTION INTRAMUSCULAR; INTRAVENOUS PRN
Status: DISCONTINUED | OUTPATIENT
Start: 2024-06-27 | End: 2024-06-27 | Stop reason: SDUPTHER

## 2024-06-27 RX ORDER — SODIUM CHLORIDE 0.9 % (FLUSH) 0.9 %
5-40 SYRINGE (ML) INJECTION EVERY 12 HOURS SCHEDULED
Status: DISCONTINUED | OUTPATIENT
Start: 2024-06-27 | End: 2024-06-27 | Stop reason: HOSPADM

## 2024-06-27 RX ORDER — ONDANSETRON 2 MG/ML
4 INJECTION INTRAMUSCULAR; INTRAVENOUS
Status: DISCONTINUED | OUTPATIENT
Start: 2024-06-27 | End: 2024-06-27 | Stop reason: HOSPADM

## 2024-06-27 RX ORDER — OXYCODONE HYDROCHLORIDE 5 MG/1
5 TABLET ORAL PRN
Status: DISCONTINUED | OUTPATIENT
Start: 2024-06-27 | End: 2024-06-27 | Stop reason: HOSPADM

## 2024-06-27 RX ORDER — LIDOCAINE HYDROCHLORIDE 20 MG/ML
INJECTION, SOLUTION EPIDURAL; INFILTRATION; INTRACAUDAL; PERINEURAL PRN
Status: DISCONTINUED | OUTPATIENT
Start: 2024-06-27 | End: 2024-06-27 | Stop reason: SDUPTHER

## 2024-06-27 RX ORDER — SODIUM CHLORIDE 9 MG/ML
INJECTION, SOLUTION INTRAVENOUS PRN
Status: DISCONTINUED | OUTPATIENT
Start: 2024-06-27 | End: 2024-06-27 | Stop reason: HOSPADM

## 2024-06-27 RX ORDER — LEVOFLOXACIN 500 MG/1
500 TABLET, FILM COATED ORAL DAILY
Qty: 7 TABLET | Refills: 0 | Status: SHIPPED | OUTPATIENT
Start: 2024-06-27 | End: 2024-07-04

## 2024-06-27 RX ORDER — KETOROLAC TROMETHAMINE 30 MG/ML
INJECTION, SOLUTION INTRAMUSCULAR; INTRAVENOUS PRN
Status: DISCONTINUED | OUTPATIENT
Start: 2024-06-27 | End: 2024-06-27 | Stop reason: SDUPTHER

## 2024-06-27 RX ORDER — DEXAMETHASONE SODIUM PHOSPHATE 4 MG/ML
INJECTION, SOLUTION INTRA-ARTICULAR; INTRALESIONAL; INTRAMUSCULAR; INTRAVENOUS; SOFT TISSUE PRN
Status: DISCONTINUED | OUTPATIENT
Start: 2024-06-27 | End: 2024-06-27 | Stop reason: SDUPTHER

## 2024-06-27 RX ORDER — TAMSULOSIN HYDROCHLORIDE 0.4 MG/1
0.4 CAPSULE ORAL DAILY
Qty: 30 CAPSULE | Refills: 5 | Status: SHIPPED | OUTPATIENT
Start: 2024-06-27

## 2024-06-27 RX ORDER — OXYCODONE HYDROCHLORIDE AND ACETAMINOPHEN 5; 325 MG/1; MG/1
1 TABLET ORAL EVERY 8 HOURS PRN
Qty: 9 TABLET | Refills: 0 | Status: SHIPPED | OUTPATIENT
Start: 2024-06-27 | End: 2024-06-30

## 2024-06-27 RX ORDER — FINASTERIDE 5 MG/1
5 TABLET, FILM COATED ORAL DAILY
Qty: 30 TABLET | Refills: 3 | Status: SHIPPED | OUTPATIENT
Start: 2024-06-27

## 2024-06-27 RX ORDER — PROPOFOL 10 MG/ML
INJECTION, EMULSION INTRAVENOUS PRN
Status: DISCONTINUED | OUTPATIENT
Start: 2024-06-27 | End: 2024-06-27 | Stop reason: SDUPTHER

## 2024-06-27 RX ORDER — ROCURONIUM BROMIDE 10 MG/ML
INJECTION, SOLUTION INTRAVENOUS PRN
Status: DISCONTINUED | OUTPATIENT
Start: 2024-06-27 | End: 2024-06-27 | Stop reason: SDUPTHER

## 2024-06-27 RX ORDER — SODIUM CHLORIDE, SODIUM LACTATE, POTASSIUM CHLORIDE, CALCIUM CHLORIDE 600; 310; 30; 20 MG/100ML; MG/100ML; MG/100ML; MG/100ML
INJECTION, SOLUTION INTRAVENOUS CONTINUOUS
Status: DISCONTINUED | OUTPATIENT
Start: 2024-06-27 | End: 2024-06-27 | Stop reason: HOSPADM

## 2024-06-27 RX ORDER — NALOXONE HYDROCHLORIDE 0.4 MG/ML
INJECTION, SOLUTION INTRAMUSCULAR; INTRAVENOUS; SUBCUTANEOUS PRN
Status: DISCONTINUED | OUTPATIENT
Start: 2024-06-27 | End: 2024-06-27 | Stop reason: HOSPADM

## 2024-06-27 RX ORDER — FENTANYL CITRATE 50 UG/ML
INJECTION, SOLUTION INTRAMUSCULAR; INTRAVENOUS PRN
Status: DISCONTINUED | OUTPATIENT
Start: 2024-06-27 | End: 2024-06-27 | Stop reason: SDUPTHER

## 2024-06-27 RX ORDER — MEPERIDINE HYDROCHLORIDE 25 MG/ML
12.5 INJECTION INTRAMUSCULAR; INTRAVENOUS; SUBCUTANEOUS EVERY 5 MIN PRN
Status: DISCONTINUED | OUTPATIENT
Start: 2024-06-27 | End: 2024-06-27 | Stop reason: HOSPADM

## 2024-06-27 RX ORDER — SODIUM CHLORIDE, SODIUM LACTATE, POTASSIUM CHLORIDE, CALCIUM CHLORIDE 600; 310; 30; 20 MG/100ML; MG/100ML; MG/100ML; MG/100ML
INJECTION, SOLUTION INTRAVENOUS CONTINUOUS PRN
Status: DISCONTINUED | OUTPATIENT
Start: 2024-06-27 | End: 2024-06-27 | Stop reason: SDUPTHER

## 2024-06-27 RX ADMIN — SODIUM CHLORIDE 2000 MG: 900 INJECTION INTRAVENOUS at 08:28

## 2024-06-27 RX ADMIN — ONDANSETRON 4 MG: 2 INJECTION INTRAMUSCULAR; INTRAVENOUS at 09:26

## 2024-06-27 RX ADMIN — LIDOCAINE HYDROCHLORIDE 60 MG: 20 INJECTION, SOLUTION EPIDURAL; INFILTRATION; INTRACAUDAL; PERINEURAL at 08:37

## 2024-06-27 RX ADMIN — SUGAMMADEX 200 MG: 100 INJECTION, SOLUTION INTRAVENOUS at 09:26

## 2024-06-27 RX ADMIN — DEXAMETHASONE SODIUM PHOSPHATE 4 MG: 4 INJECTION, SOLUTION INTRAMUSCULAR; INTRAVENOUS at 08:50

## 2024-06-27 RX ADMIN — FENTANYL CITRATE 50 MCG: 50 INJECTION INTRAMUSCULAR; INTRAVENOUS at 09:02

## 2024-06-27 RX ADMIN — FAMOTIDINE 20 MG: 10 INJECTION, SOLUTION INTRAVENOUS at 06:52

## 2024-06-27 RX ADMIN — FENTANYL CITRATE 50 MCG: 50 INJECTION INTRAMUSCULAR; INTRAVENOUS at 08:37

## 2024-06-27 RX ADMIN — KETOROLAC TROMETHAMINE 15 MG: 30 INJECTION, SOLUTION INTRAMUSCULAR at 09:26

## 2024-06-27 RX ADMIN — SODIUM CHLORIDE, POTASSIUM CHLORIDE, SODIUM LACTATE AND CALCIUM CHLORIDE: 600; 310; 30; 20 INJECTION, SOLUTION INTRAVENOUS at 08:31

## 2024-06-27 RX ADMIN — PROPOFOL 300 MG: 10 INJECTION, EMULSION INTRAVENOUS at 08:37

## 2024-06-27 RX ADMIN — SODIUM CHLORIDE, POTASSIUM CHLORIDE, SODIUM LACTATE AND CALCIUM CHLORIDE: 600; 310; 30; 20 INJECTION, SOLUTION INTRAVENOUS at 06:52

## 2024-06-27 RX ADMIN — ROCURONIUM BROMIDE 50 MG: 10 INJECTION INTRAVENOUS at 08:37

## 2024-06-27 ASSESSMENT — LIFESTYLE VARIABLES: SMOKING_STATUS: 0

## 2024-06-27 ASSESSMENT — PAIN - FUNCTIONAL ASSESSMENT: PAIN_FUNCTIONAL_ASSESSMENT: 0-10

## 2024-06-27 ASSESSMENT — ENCOUNTER SYMPTOMS: SHORTNESS OF BREATH: 0

## 2024-06-27 ASSESSMENT — PAIN SCALES - GENERAL: PAINLEVEL_OUTOF10: 0

## 2024-06-27 ASSESSMENT — PAIN DESCRIPTION - DESCRIPTORS: DESCRIPTORS: OTHER (COMMENT)

## 2024-06-27 NOTE — PROGRESS NOTES
Patient a/o x4, able to drink fluids without nausea, ambulate and expresses hunger. D/c instructions given to patient and brother of patient.(Brother of patient expresses understanding.)  All vital signs stable.

## 2024-06-27 NOTE — OP NOTE
Operative Note      Patient: Blake Gomez  YOB: 1960  MRN: [id number]    Date of Procedure: 6/27/2024    Pre-Op Diagnosis Codes:     * Calculus of ureter [N20.1]; bilateral renal stones, possible bladder stone    Post-Operative Diagnosis: same ; bladder stone BPH     Procedure Performed:   1. Left ureteroscopy  2. holmium laser lithotripsy  3. basket extraction of stones  4. Fluoroscopy less than 1hr monitored by physician, retrograde pyelogram with interpretation  5. Ureteral stent placement  6.  Cystoscolitholapaxy, pulverization extraction bladder calculi,  2 cm      Surgeon:  AYE NUGENT MD  Anesthesia: General      Drains/Tubes:  JJ ureteral stent   Specimens: Stone fragments  Estimated Blood Loss: 3 mL   Complications: None     Findings:    -8mm stone identified in the ureter near UVJ.   Stone fragmented and extracted with a basket and some pieces were dumped in the bladder.  -visually stone free in ureter at the end of the case   -fluoroscopy confirmed stent in good position and no extravasation of contrast  -BPH prostate volume, 70 g or so; small median bar, 2 cm bladder calculi    Indications: See hosp h/p or office notes.  8mm Ureteral stone and uncontrolled pain and requesting stone removal.  Note bilateral renal stones --- 1 cm and 5 cm in the left kidney 1 is interpolar 1 is lower pole  Right kidney less total stone  BPH, possible bladder stone  Risks, benefits, and alternatives of surgery were reviewed. Consent was signed prior to surgery.    Procedure:  After obtaining informed consent, the patient was brought to the operating room and placed supine on the operating room table.  After adequate anesthesia, she was then repositioned in the dorsal lithotomy position and prepped & draped in the standard surgical fashion. I began the case by doing rigid cystoscopy with a 21-Icelandic sheath and a 30-degree lens. The urethra was within normal limits. Once in the bladder, I saw no

## 2024-06-27 NOTE — BRIEF OP NOTE
Brief Postoperative Note      Patient: Blake Gomez  YOB: 1960  MRN: [id number]    Date of Procedure: 6/27/2024    Pre-Op Diagnosis Codes:     * Calculus of ureter [N20.1]    Post-Op Diagnosis: Same       Procedure(s):  CYSTOSCOPY, LEFT URETEROSCOPY, STONE MANIPULATION, HOLIUM LASER LITHOTRIPSY, LEFT STENT INSERTION  Cystoscopy litholapaxy, large bladder stone,2 cm    See detailed dictated operative notes for more procedural details      Surgeon(s):  Placido Harding MD    Assistant:  * No surgical staff found *    Anesthesia: General    Estimated Blood Loss (mL): Minimal    Complications: No, stone extraction next couple weeks follow-up next week Castaneda catheter removalne    Specimens:   ID Type Source Tests Collected by Time Destination   A : LEFT URETERAL STONE Stone (Calculus) Tissue SURGICAL PATHOLOGY Placido Harding MD 6/27/2024 0901        Implants:  Implant Name Type Inv. Item Serial No.  Lot No. LRB No. Used Action   STENT URET 7FR L24CM PERCFLX HYDR+ SFT PGTL TAPR TIP W/O - [id number]  STENT URET 7FR L24CM PERCFLX HYDR+ SFT PGTL TAPR TIP W/O  [employer]- [id number] N/A 1 Implanted         Drains:   Urinary Catheter 06/27/24 Castaneda (Active)       Findings:  Infection Present At Time Of Surgery (PATOS) (choose all levels that have infection present):  No infection present  Other Findings:    Dictated    Follow-up left ureteroscopy, laser lithotripsy of the renal stones and double-J stent exchange removal as well as a  bph outlet surg, greenlight prostate    Fu Tuesday this week  Dc with castaneda      Electronically signed by Placido Harding MD on 6/27/2024 at 9:28 AM

## 2024-06-27 NOTE — DISCHARGE INSTRUCTIONS
medication    NORMAL (POSSIBLE) SIDE EFFECTS FROM ANESTHESIA:     Confusion, temporary memory loss, delayed reaction times in the first 24 hours  Lightheadedness, dizziness, difficulty focusing, blurred vision  Nausea/vomiting can happen  Shivering, feeling cold, sore throat, cough and muscle aches should stop within 24-48 hours  Trouble urinating - call your surgeon if it has been more than 8 hrs  Bruising or soreness at the IV site - call if it remains red, firm or there is drainage             FEMALES OF CHILDBEARING AGE WHO ARE TAKING BIRTH CONTROL PILLS:  You may have received a medication during your procedure that interferes with the   actions of birth control pills (Bridion or Emend). Use some other kind of birth control in addition to your pills, like a condom, for 1 month after your procedure to prevent unwanted pregnancy.    The following instructions are to be followed if you have a known history or diagnosis of sleep apnea:  For all sleep apnea patients:  ? Sleep on your side or sitting up in a chair whenever possible, especially the first 24 hours after surgery.  ? Use only medicines prescribed by your doctor.    ? Do not drink alcohol.  ? If you have a dental device to assist you while at rest, use it at all times for the first 24 hours.  For patients using CPAP machines:  ? Use your CPAP machine during all periods of sleep as usual.  ? Use your CPAP machine during all periods of daytime rest while on pain medicines.  ** Follow up with your primary care doctor for continued care.    IF YOU DO NOT TAKE ALL OF YOUR NARCOTIC PAIN MEDICATION, please dispose of them responsibly. There are drop off boxes in the Emergency Departments 24/7 at both [facility]. If these locations are not convenient, other options for discarding them can be found at:  [email or web address]    Hospital or office staff may NOT accept any medications to drop off in the cabinet for you.

## 2024-06-27 NOTE — ANESTHESIA POSTPROCEDURE EVALUATION
Department of Anesthesiology  Postprocedure Note    Patient: Blake Gomez  MRN: [id number]  YOB: 1960  Date of evaluation: 6/27/2024    Procedure Summary       Date: 06/27/24 Room / Location: [address]    Anesthesia Start: 0833 Anesthesia Stop: 0940    Procedure: CYSTOSCOPY, LEFT URETEROSCOPY, STONE MANIPULATION, HOLIUM LASER LITHOTRIPSY, LEFT STENT INSERTION (Bladder) Diagnosis:       Calculus of ureter      (Calculus of ureter [N20.1])    Surgeons: Placido Harding MD Responsible Provider: Salo Sales MD    Anesthesia Type: general ASA Status: 3            Anesthesia Type: No value filed.    Reno Phase I: Reno Score: 10    Reno Phase II: Reno Score: 10    Anesthesia Post Evaluation    Patient location during evaluation: bedside  Patient participation: complete - patient participated  Level of consciousness: awake and alert  Airway patency: patent  Nausea & Vomiting: no nausea  Cardiovascular status: hemodynamically stable  Respiratory status: acceptable  Hydration status: euvolemic  Pain management: adequate      Vitals:    06/27/24 1010 06/27/24 1015 06/27/24 1020 06/27/24 1025   BP: (!) 160/92 (!) 159/87  (!) 147/93   Pulse: 87 87 88 90   Resp: 17 19 18 19   Temp:       TempSrc:       SpO2: 96% 93% 93%    Weight:       Height:            No notable events documented.

## 2024-06-27 NOTE — ANESTHESIA PRE PROCEDURE
Department of Anesthesiology  Preprocedure Note       Name:  Blake Gomez   Age:  64 y.o.  :  1960                                          MRN:  [id number]         Date:  2024      Surgeon: Surgeon(s):  Placido Harding MD    Procedure: Procedure(s):  CYSTOSCOPY, LEFT URETEROSCOPY, STONE MANIPULATION, HOLIUM LASER, POSSIBLE LEFT STENT INSERTION    Medications prior to admission:   Prior to Admission medications    Medication Sig Start Date End Date Taking? Authorizing Provider   busPIRone (BUSPAR) 15 MG tablet TAKE 1 TABLET BY MOUTH THREE TIMES A DAY 24   Marion Sena APRN - CNP   metFORMIN (GLUCOPHAGE) 1000 MG tablet Take 1 tablet by mouth 2 times daily (with meals) 24   Marion Sena APRN - CNP   Omega-3 Fatty Acids (FISH OIL) 1000 MG capsule TAKE 1 CAPSULE BY MOUTH THREE TIMES A DAY 24   Emanuel Soriano DO   QUEtiapine (SEROQUEL) 200 MG tablet TAKE 1 TABLET BY MOUTH TWICE A DAY 24   Marion Sena APRN - CNP   venlafaxine (EFFEXOR XR) 150 MG extended release capsule TAKE 1 CAPSULE BY MOUTH EVERY DAY 3/15/24   Marion Sena APRN - CNP   valsartan-hydroCHLOROthiazide (DIOVAN-HCT) 320-25 MG per tablet TAKE 1 TABLET BY MOUTH EVERY DAY 3/8/24   Kevin Wynne MD   dapagliflozin (FARXIGA) 10 MG tablet TAKE 1 TABLET BY MOUTH EVERY DAY IN THE MORNING 3/8/24   Marion Sena APRN - CNP   atorvastatin (LIPITOR) 40 MG tablet TAKE 1 TABLET BY MOUTH EVERY DAY 3/8/24   Marion Sena APRN - CNP   cloNIDine (CATAPRES) 0.2 MG tablet TAKE 1 TABLET BY MOUTH TWICE A DAY 3/8/24   Kevin Wynne MD   labetalol (NORMODYNE) 100 MG tablet Take 1 tablet by mouth 2 times daily 23   Francis Yeung MD   verapamil (CALAN SR) 240 MG extended release tablet TAKE 1 TABLET BY MOUTH NIGHTLY 23   Francis Yeung MD   aspirin 81 MG EC tablet Take 1 tablet by mouth daily    Provider, MD Narendra       Current medications:    Current

## 2024-06-27 NOTE — CONSULTS
Patient:  Blake Gomez  YOB: 1960   CSN:  [id number]    Referring Provider:  No ref. provider found   Primary Care Provider:  Marion Sena, JOSE RAMON - CNP       Urology Attending Consult Note     Reason for Consultation:  nephrolithiasis, flank pain    Chief Complaint: \"I have a stone\"  HPI:  Blake is a 64 y.o. male who presented with flank pain which prompted CT showed a 8mm ureter stone.  No fevers. The patient has been unable to pass the stone.        Past Medical History:   Diagnosis Date    Depression     Diabetes mellitus (HCC)     Hypertension        History reviewed. No pertinent surgical history.    Medication List reviewed:     Current Facility-Administered Medications   Medication Dose Route Frequency Provider Last Rate Last Admin    lactated ringers IV soln infusion   IntraVENous Continuous Salo Sales  mL/hr at 06/27/24 0652 New Bag at 06/27/24 0652    sodium chloride flush 0.9 % injection 5-40 mL  5-40 mL IntraVENous 2 times per day Salo Sales MD        sodium chloride flush 0.9 % injection 5-40 mL  5-40 mL IntraVENous PRN Salo Sales MD        0.9 % sodium chloride infusion   IntraVENous PRN Salo Sales MD        ceFAZolin (ANCEF) 2,000 mg in sodium chloride 0.9 % 50 mL IVPB (mini-bag)  2,000 mg IntraVENous Once Placido Harding MD           No Known Allergies    Family History   Problem Relation Age of Onset    Heart Disease Mother     High Blood Pressure Mother     Depression Mother     Heart Disease Father     Heart Attack Father     High Blood Pressure Sister     No Known Problems Brother     Heart Disease Maternal Grandmother     High Blood Pressure Maternal Grandmother     Heart Disease Maternal Grandfather     High Blood Pressure Maternal Grandfather     Heart Disease Paternal Grandmother     High Blood Pressure Paternal Grandmother     Heart Disease Paternal Grandfather     High Blood Pressure

## 2024-07-01 LAB
APPEARANCE STONE: NORMAL
COMPN STONE: NORMAL
SPECIMEN WT: 383 MG

## 2024-07-03 NOTE — PROGRESS NOTES
Paternal Grandfather     High Blood Pressure Paternal Grandfather          A:  Patient endorses stable mood. Denies SI/HI, with good insight and motivation.       Diagnosis:    1. Anxiety          Past Diagnosis:        Diagnosis Date    Depression     Diabetes mellitus (HCC)     Hypertension            Plan:  Pt interventions:  Provided handout on  diaphragmatic breathing  Explained relaxed breathing technique in detail and practiced this with pt in visit  Established rapport  Conducted functional assessment  Supportive techniques  [place]-setting to identify pt's primary goals for [facility] visit / overall health  Collaborative treatment planning,Clarified role of C in primary care,Recommended that pt establish with a mental health clinician with whom they can meet regularly for psychotherapy services  Provided community resources    Pt Behavioral Change Plan:   See Pt Instructions

## 2024-07-08 RX ORDER — CHLORAL HYDRATE 500 MG
CAPSULE ORAL
Qty: 90 CAPSULE | Refills: 1 | OUTPATIENT
Start: 2024-07-08

## 2024-07-08 NOTE — TELEPHONE ENCOUNTER
Last ov 06/17/2024   Future Appointments   Date Time Provider Department Center   7/10/2024  2:30 PM Della Eaton LISW D PSYCHOLOGY [facility]   9/16/2024 10:00 AM [facility] RM 1 [facility] Rebecca Rad

## 2024-07-10 ENCOUNTER — TELEMEDICINE (OUTPATIENT)
Dept: PSYCHOLOGY | Age: 64
End: 2024-07-10
Payer: MEDICARE

## 2024-07-10 DIAGNOSIS — F41.9 ANXIETY: Primary | ICD-10-CM

## 2024-07-10 PROCEDURE — 90791 PSYCH DIAGNOSTIC EVALUATION: CPT | Performed by: SOCIAL WORKER

## 2024-07-10 PROCEDURE — 1123F ACP DISCUSS/DSCN MKR DOCD: CPT | Performed by: SOCIAL WORKER

## 2024-07-10 PROCEDURE — 1036F TOBACCO NON-USER: CPT | Performed by: SOCIAL WORKER

## 2024-07-10 RX ORDER — LEVOFLOXACIN 500 MG/1
500 TABLET, FILM COATED ORAL DAILY
COMMUNITY
Start: 2024-07-09 | End: 2024-07-19

## 2024-07-10 NOTE — PATIENT INSTRUCTIONS
[facility]: [phone]   Take 2-3 diaphragmatic breaths every hour for the next several days, then as needed for anxiety.         Relaxation:  Diaphragmatic Breathing             ______________________________________________________________________________    1.  Sit in a comfortable position    2.  Place one hand on your stomach and the other on your chest    3.  Try to breathe so that only your stomach rises and falls    As you inhale, concentrate on your chest remaining relatively still while your stomach rises.  It may be helpful for you to imagine that your pants are too big and you need to push your stomach out to hold them up.  When exhaling, allow your stomach to fall in and the air to fully escape.    Inhale slowly.  You may choose to hold the air in for about a second.  Exhale slowly.  Don’t push the air out, but just let the natural pressure of your body slowly move it out.    It is normal for this healthy method of breathing to feel a little awkward at first.  With practice, it will feel more natural.    Get your mind on your side    One other important factor in getting relaxed is your mind.  Your mind and body are connected.  The mind influences the body and the body influences the mind.  What you do with your mind when you are trying to relax is very important.  The key is to avoid thinking about stressful things.      You can think about…      Neutral things (e.g., counting, saying a word like “calm” or “relax”)   Pleasant things (e.g., imagining a pleasant place)    It is recommended that you practice 2 times per day, 10 minutes each time.

## 2024-07-10 NOTE — PROGRESS NOTES
FOCUS NOTES:    DOS: 7/18/24   Surgeon: Dr. Harding      Date:  Follow up  Comment        PAT Initials    7/10/24 PAT Call complete. Dr. Harding Office to fax the Cardiac Clearance the have on file and to check if H+P will be done DOS. H+P on file from PCP on 6/17/24.  ABBI  7/11/24  Received Cardiac Clearance faxed from Dr. Yanez's Office, scanned into Media. Received VM from Dr. Yanez's Office, per Bailey, H+P will be updated DOS.  ABBI

## 2024-07-10 NOTE — PROGRESS NOTES
[facility] PRE-OPERATIVE INSTRUCTIONS       DOS: __7/18/24__        Pre-Op Instructions     Patients receiving local anesthetic only will arrive one hour prior to the procedure, all other patients will arrive 1.5 hours prior to procedure time.    [x]  A History and Physical will be required within 30 days prior to surgery date. Some patients may require cardiac or pulmonary clearance. H&P will be completed DOS for Endo/colonoscopy patients.     [x]  Reviewed Medical and Surgical history, medication list, confirmed with patient any implants, allergies, bleeding disorders, CONNOR and reactions to Anesthesia.    [x]  If there is a change in physical condition between now and the day of surgery, please notify your surgeon. This includes a cough, cold, fever, sore throat, nausea, vomiting and diarrhea. Also notify your surgeon if you experience dizziness, shortness of breath or blurred vision.    [x] Reviewed hx of C-Diff, MRSA, VRE and/or recent use of Antibiotics     [x]  All patients having a procedure must have a ride home by a responsible person that is over the age of 18 and ensure it is someone that we can share medical information with. After discharge, a responsible adult needs to stay with you for 24 hours. There is a limit of 2 adult visitors per room.     If unable to secure ride and/or care taker, please contact surgeon's office.      [x]  No alcohol, smoking or marijuana use 24 hours prior to surgery. Any use of recreational drugs must be stopped 5 days prior to surgery.     [x]  NPO after midnight (Any heart, BP, seizure, thyroid and breathing medications are okay to take the morning of surgery with a small sip of water 4 hours prior to procedure).    The morning of surgery, you may brush your teeth, just no swallowing water. Also, NO gum, candy, mints or ice chips.    []  For Colonoscopy's, follow prep-instructions as indicated by physician.     []  Patients with a insulin pump, keep set on  basal rate on day of surgery. Long-acting insulin should be administered the evening before, take only half of usual dose. Always check with prescribing doctor if there are any questions.   [] Do not give any correction doses of insulin the morning of procedure.  [] GLP-1 inhibitor medications should be stopped 7 days prior to surgery.  [] Do not take any oral diabetic medications the morning of procedure.    []  You should shower the night before or the morning of surgery with an antibacterial soap i.e. safeguard or dial. Your surgeon may require you to use Hibiclens (an antiseptic skin cleanser) please follow physician’s instructions.     []  Do not shave or wax operative site 96 hours (4 days) prior to procedure.      [x]  No jewelry including body piercings, no makeup, or nail polish. No lotion, powders, creams, perfumes or metal hairclips.     [x]  Dress in loose comfortable clothing. Leave all valuables at home.    [x]  Please contact your surgeon if you are taking blood thinners, aspirin, anti-inflammatory medications, vitamins, or fish oil. They may require you to stop taking them 5-7 days prior.             ITEMS TO BRING TO THE HOSPITAL ON DOS:     []  Your prescribed rescue inhaler     [x]  ID and insurance card, form of payment if have co-pay, current medication list, living will and POA (if you have one).     []  Home c-pap and/or home O2 oxygen tank.      [x]  Any assistive medical devices (i.e.Walker, cane, wheelchair, etc.) or immobilizer or sling needed postoperatively      [x]  You may bring dentures, glasses, contacts, and/or hearing aids, however, they will need to be removed before your procedure. Please bring cases to store them in for safekeeping and leave them with the person you came with.        Our goal is to provide you with safe and excellent care. Please contact pre-admission testing if you have any further questions. (Please note, these are generalized instructions for all surgical

## 2024-07-11 DIAGNOSIS — F32.A DEPRESSION, UNSPECIFIED DEPRESSION TYPE: ICD-10-CM

## 2024-07-11 DIAGNOSIS — F41.9 ANXIETY: ICD-10-CM

## 2024-07-12 RX ORDER — QUETIAPINE FUMARATE 200 MG/1
200 TABLET, FILM COATED ORAL 2 TIMES DAILY
Qty: 180 TABLET | Refills: 1 | Status: SHIPPED | OUTPATIENT
Start: 2024-07-12

## 2024-07-17 ENCOUNTER — ANESTHESIA EVENT (OUTPATIENT)
Age: 64
End: 2024-07-17
Payer: MEDICARE

## 2024-07-18 ENCOUNTER — APPOINTMENT (OUTPATIENT)
Age: 64
End: 2024-07-18
Attending: UROLOGY
Payer: MEDICARE

## 2024-07-18 ENCOUNTER — HOSPITAL ENCOUNTER (OUTPATIENT)
Age: 64
Setting detail: OUTPATIENT SURGERY
Discharge: HOME | End: 2024-07-18
Attending: UROLOGY | Admitting: UROLOGY
Payer: MEDICARE

## 2024-07-18 ENCOUNTER — ANESTHESIA (OUTPATIENT)
Age: 64
End: 2024-07-18
Payer: MEDICARE

## 2024-07-18 VITALS
SYSTOLIC BLOOD PRESSURE: 150 MMHG | TEMPERATURE: 98.1 F | WEIGHT: 217 LBS | DIASTOLIC BLOOD PRESSURE: 92 MMHG | HEIGHT: 66 IN | RESPIRATION RATE: 18 BRPM | HEART RATE: 99 BPM | OXYGEN SATURATION: 95 % | BODY MASS INDEX: 34.87 KG/M2

## 2024-07-18 DIAGNOSIS — G89.18 POSTOPERATIVE PAIN: Primary | ICD-10-CM

## 2024-07-18 LAB
GLUCOSE BLD-MCNC: 154 MG/DL (ref 70–99)
GLUCOSE BLD-MCNC: 188 MG/DL (ref 70–99)

## 2024-07-18 PROCEDURE — 3700000001 HC ADD 15 MINUTES (ANESTHESIA): Performed by: UROLOGY

## 2024-07-18 PROCEDURE — 2580000003 HC RX 258: Performed by: UROLOGY

## 2024-07-18 PROCEDURE — C1769 GUIDE WIRE: HCPCS | Performed by: UROLOGY

## 2024-07-18 PROCEDURE — C2617 STENT, NON-COR, TEM W/O DEL: HCPCS | Performed by: UROLOGY

## 2024-07-18 PROCEDURE — A4217 STERILE WATER/SALINE, 500 ML: HCPCS | Performed by: UROLOGY

## 2024-07-18 PROCEDURE — 2709999900 HC NON-CHARGEABLE SUPPLY: Performed by: UROLOGY

## 2024-07-18 PROCEDURE — 3600000014 HC SURGERY LEVEL 4 ADDTL 15MIN: Performed by: UROLOGY

## 2024-07-18 PROCEDURE — 6360000002 HC RX W HCPCS: Performed by: UROLOGY

## 2024-07-18 PROCEDURE — 74420 UROGRAPHY RTRGR +-KUB: CPT

## 2024-07-18 PROCEDURE — C1894 INTRO/SHEATH, NON-LASER: HCPCS | Performed by: UROLOGY

## 2024-07-18 PROCEDURE — 3700000000 HC ANESTHESIA ATTENDED CARE: Performed by: UROLOGY

## 2024-07-18 PROCEDURE — 7100000001 HC PACU RECOVERY - ADDTL 15 MIN: Performed by: UROLOGY

## 2024-07-18 PROCEDURE — 2580000003 HC RX 258: Performed by: ANESTHESIOLOGY

## 2024-07-18 PROCEDURE — 2500000003 HC RX 250 WO HCPCS

## 2024-07-18 PROCEDURE — 7100000010 HC PHASE II RECOVERY - FIRST 15 MIN: Performed by: UROLOGY

## 2024-07-18 PROCEDURE — 6370000000 HC RX 637 (ALT 250 FOR IP): Performed by: ANESTHESIOLOGY

## 2024-07-18 PROCEDURE — 6360000004 HC RX CONTRAST MEDICATION: Performed by: UROLOGY

## 2024-07-18 PROCEDURE — C1713 ANCHOR/SCREW BN/BN,TIS/BN: HCPCS | Performed by: UROLOGY

## 2024-07-18 PROCEDURE — 82962 GLUCOSE BLOOD TEST: CPT

## 2024-07-18 PROCEDURE — 7100000011 HC PHASE II RECOVERY - ADDTL 15 MIN: Performed by: UROLOGY

## 2024-07-18 PROCEDURE — 6360000002 HC RX W HCPCS

## 2024-07-18 PROCEDURE — 3600000004 HC SURGERY LEVEL 4 BASE: Performed by: UROLOGY

## 2024-07-18 PROCEDURE — 7100000000 HC PACU RECOVERY - FIRST 15 MIN: Performed by: UROLOGY

## 2024-07-18 DEVICE — URETERAL STENT
Type: IMPLANTABLE DEVICE | Site: URETER | Status: FUNCTIONAL
Brand: CONTOUR™

## 2024-07-18 RX ORDER — SUCCINYLCHOLINE/SOD CL,ISO/PF 200MG/10ML
SYRINGE (ML) INTRAVENOUS PRN
Status: DISCONTINUED | OUTPATIENT
Start: 2024-07-18 | End: 2024-07-18 | Stop reason: SDUPTHER

## 2024-07-18 RX ORDER — OXYCODONE HYDROCHLORIDE AND ACETAMINOPHEN 5; 325 MG/1; MG/1
1 TABLET ORAL EVERY 8 HOURS PRN
Qty: 9 TABLET | Refills: 0 | Status: SHIPPED | OUTPATIENT
Start: 2024-07-18 | End: 2024-07-21

## 2024-07-18 RX ORDER — LEVOFLOXACIN 500 MG/1
500 TABLET, FILM COATED ORAL DAILY
Qty: 7 TABLET | Refills: 0 | Status: SHIPPED | OUTPATIENT
Start: 2024-07-18 | End: 2024-07-25

## 2024-07-18 RX ORDER — NALOXONE HYDROCHLORIDE 0.4 MG/ML
INJECTION, SOLUTION INTRAMUSCULAR; INTRAVENOUS; SUBCUTANEOUS PRN
Status: DISCONTINUED | OUTPATIENT
Start: 2024-07-18 | End: 2024-07-18 | Stop reason: HOSPADM

## 2024-07-18 RX ORDER — SODIUM CHLORIDE 9 MG/ML
INJECTION, SOLUTION INTRAVENOUS PRN
Status: DISCONTINUED | OUTPATIENT
Start: 2024-07-18 | End: 2024-07-18 | Stop reason: HOSPADM

## 2024-07-18 RX ORDER — SODIUM CHLORIDE 0.9 % (FLUSH) 0.9 %
5-40 SYRINGE (ML) INJECTION EVERY 12 HOURS SCHEDULED
Status: DISCONTINUED | OUTPATIENT
Start: 2024-07-18 | End: 2024-07-18 | Stop reason: HOSPADM

## 2024-07-18 RX ORDER — LIDOCAINE HYDROCHLORIDE 20 MG/ML
INJECTION, SOLUTION INTRAVENOUS PRN
Status: DISCONTINUED | OUTPATIENT
Start: 2024-07-18 | End: 2024-07-18 | Stop reason: SDUPTHER

## 2024-07-18 RX ORDER — GLYCOPYRROLATE 0.2 MG/ML
INJECTION INTRAMUSCULAR; INTRAVENOUS PRN
Status: DISCONTINUED | OUTPATIENT
Start: 2024-07-18 | End: 2024-07-18 | Stop reason: SDUPTHER

## 2024-07-18 RX ORDER — SODIUM CHLORIDE 0.9 % (FLUSH) 0.9 %
5-40 SYRINGE (ML) INJECTION PRN
Status: DISCONTINUED | OUTPATIENT
Start: 2024-07-18 | End: 2024-07-18 | Stop reason: HOSPADM

## 2024-07-18 RX ORDER — ESMOLOL HYDROCHLORIDE 10 MG/ML
INJECTION INTRAVENOUS PRN
Status: DISCONTINUED | OUTPATIENT
Start: 2024-07-18 | End: 2024-07-18 | Stop reason: SDUPTHER

## 2024-07-18 RX ORDER — ONDANSETRON 2 MG/ML
INJECTION INTRAMUSCULAR; INTRAVENOUS PRN
Status: DISCONTINUED | OUTPATIENT
Start: 2024-07-18 | End: 2024-07-18 | Stop reason: SDUPTHER

## 2024-07-18 RX ORDER — PHENYLEPHRINE HCL IN 0.9% NACL 1 MG/10 ML
SYRINGE (ML) INTRAVENOUS PRN
Status: DISCONTINUED | OUTPATIENT
Start: 2024-07-18 | End: 2024-07-18 | Stop reason: SDUPTHER

## 2024-07-18 RX ORDER — DROPERIDOL 2.5 MG/ML
0.62 INJECTION, SOLUTION INTRAMUSCULAR; INTRAVENOUS
Status: DISCONTINUED | OUTPATIENT
Start: 2024-07-18 | End: 2024-07-18 | Stop reason: HOSPADM

## 2024-07-18 RX ORDER — DEXAMETHASONE SODIUM PHOSPHATE 4 MG/ML
INJECTION, SOLUTION INTRA-ARTICULAR; INTRALESIONAL; INTRAMUSCULAR; INTRAVENOUS; SOFT TISSUE PRN
Status: DISCONTINUED | OUTPATIENT
Start: 2024-07-18 | End: 2024-07-18 | Stop reason: SDUPTHER

## 2024-07-18 RX ORDER — EPHEDRINE SULFATE 50 MG/ML
INJECTION INTRAVENOUS PRN
Status: DISCONTINUED | OUTPATIENT
Start: 2024-07-18 | End: 2024-07-18 | Stop reason: SDUPTHER

## 2024-07-18 RX ORDER — PROPOFOL 10 MG/ML
INJECTION, EMULSION INTRAVENOUS PRN
Status: DISCONTINUED | OUTPATIENT
Start: 2024-07-18 | End: 2024-07-18 | Stop reason: SDUPTHER

## 2024-07-18 RX ORDER — FENTANYL CITRATE 50 UG/ML
INJECTION, SOLUTION INTRAMUSCULAR; INTRAVENOUS PRN
Status: DISCONTINUED | OUTPATIENT
Start: 2024-07-18 | End: 2024-07-18 | Stop reason: SDUPTHER

## 2024-07-18 RX ORDER — FENTANYL CITRATE 50 UG/ML
25 INJECTION, SOLUTION INTRAMUSCULAR; INTRAVENOUS EVERY 5 MIN PRN
Status: DISCONTINUED | OUTPATIENT
Start: 2024-07-18 | End: 2024-07-18 | Stop reason: HOSPADM

## 2024-07-18 RX ORDER — ONDANSETRON 2 MG/ML
4 INJECTION INTRAMUSCULAR; INTRAVENOUS
Status: DISCONTINUED | OUTPATIENT
Start: 2024-07-18 | End: 2024-07-18 | Stop reason: HOSPADM

## 2024-07-18 RX ORDER — ROCURONIUM BROMIDE 10 MG/ML
INJECTION, SOLUTION INTRAVENOUS PRN
Status: DISCONTINUED | OUTPATIENT
Start: 2024-07-18 | End: 2024-07-18 | Stop reason: SDUPTHER

## 2024-07-18 RX ORDER — MAGNESIUM HYDROXIDE 1200 MG/15ML
LIQUID ORAL CONTINUOUS PRN
Status: COMPLETED | OUTPATIENT
Start: 2024-07-18 | End: 2024-07-18

## 2024-07-18 RX ORDER — OXYCODONE HYDROCHLORIDE 5 MG/1
5 TABLET ORAL
Status: COMPLETED | OUTPATIENT
Start: 2024-07-18 | End: 2024-07-18

## 2024-07-18 RX ORDER — VASOPRESSIN 20 U/ML
INJECTION PARENTERAL PRN
Status: DISCONTINUED | OUTPATIENT
Start: 2024-07-18 | End: 2024-07-18 | Stop reason: SDUPTHER

## 2024-07-18 RX ORDER — MIDAZOLAM HYDROCHLORIDE 1 MG/ML
INJECTION INTRAMUSCULAR; INTRAVENOUS PRN
Status: DISCONTINUED | OUTPATIENT
Start: 2024-07-18 | End: 2024-07-18 | Stop reason: SDUPTHER

## 2024-07-18 RX ADMIN — CEFAZOLIN 2000 MG: 2 INJECTION, POWDER, FOR SOLUTION INTRAMUSCULAR; INTRAVENOUS at 09:54

## 2024-07-18 RX ADMIN — EPHEDRINE SULFATE 5 MG: 50 INJECTION, SOLUTION INTRAVENOUS at 10:28

## 2024-07-18 RX ADMIN — Medication 100 MCG: at 10:19

## 2024-07-18 RX ADMIN — FENTANYL CITRATE 25 MCG: 50 INJECTION INTRAMUSCULAR; INTRAVENOUS at 10:40

## 2024-07-18 RX ADMIN — Medication 100 MCG: at 10:33

## 2024-07-18 RX ADMIN — VASOPRESSIN 2 UNITS: 20 INJECTION INTRAVENOUS at 10:22

## 2024-07-18 RX ADMIN — FENTANYL CITRATE 25 MCG: 50 INJECTION INTRAMUSCULAR; INTRAVENOUS at 11:03

## 2024-07-18 RX ADMIN — Medication 140 MG: at 09:52

## 2024-07-18 RX ADMIN — Medication 50 MCG: at 10:01

## 2024-07-18 RX ADMIN — FENTANYL CITRATE 50 MCG: 50 INJECTION INTRAMUSCULAR; INTRAVENOUS at 09:52

## 2024-07-18 RX ADMIN — Medication 50 MCG: at 09:59

## 2024-07-18 RX ADMIN — Medication 100 MCG: at 10:03

## 2024-07-18 RX ADMIN — VASOPRESSIN 2 UNITS: 20 INJECTION INTRAVENOUS at 10:33

## 2024-07-18 RX ADMIN — GLYCOPYRROLATE 0.2 MG: 0.2 INJECTION, SOLUTION INTRAMUSCULAR; INTRAVENOUS at 10:20

## 2024-07-18 RX ADMIN — ROCURONIUM BROMIDE 10 MG: 10 INJECTION, SOLUTION INTRAVENOUS at 09:52

## 2024-07-18 RX ADMIN — ROCURONIUM BROMIDE 40 MG: 10 INJECTION, SOLUTION INTRAVENOUS at 09:54

## 2024-07-18 RX ADMIN — ROCURONIUM BROMIDE 20 MG: 10 INJECTION, SOLUTION INTRAVENOUS at 10:20

## 2024-07-18 RX ADMIN — EPHEDRINE SULFATE 2.5 MG: 50 INJECTION, SOLUTION INTRAVENOUS at 10:17

## 2024-07-18 RX ADMIN — ROCURONIUM BROMIDE 10 MG: 10 INJECTION, SOLUTION INTRAVENOUS at 10:50

## 2024-07-18 RX ADMIN — OXYCODONE HYDROCHLORIDE 5 MG: 5 TABLET ORAL at 12:25

## 2024-07-18 RX ADMIN — EPHEDRINE SULFATE 10 MG: 50 INJECTION, SOLUTION INTRAVENOUS at 10:24

## 2024-07-18 RX ADMIN — DEXAMETHASONE SODIUM PHOSPHATE 8 MG: 4 INJECTION, SOLUTION INTRA-ARTICULAR; INTRALESIONAL; INTRAMUSCULAR; INTRAVENOUS; SOFT TISSUE at 09:56

## 2024-07-18 RX ADMIN — ESMOLOL HYDROCHLORIDE 30 MG: 10 INJECTION, SOLUTION INTRAVENOUS at 11:04

## 2024-07-18 RX ADMIN — LIDOCAINE HYDROCHLORIDE 100 MG: 20 INJECTION, SOLUTION INTRAVENOUS at 09:52

## 2024-07-18 RX ADMIN — MIDAZOLAM 2 MG: 1 INJECTION INTRAMUSCULAR; INTRAVENOUS at 09:45

## 2024-07-18 RX ADMIN — FENTANYL CITRATE 25 MCG: 50 INJECTION INTRAMUSCULAR; INTRAVENOUS at 10:46

## 2024-07-18 RX ADMIN — EPHEDRINE SULFATE 2.5 MG: 50 INJECTION, SOLUTION INTRAVENOUS at 10:19

## 2024-07-18 RX ADMIN — SODIUM CHLORIDE: 9 INJECTION, SOLUTION INTRAVENOUS at 09:12

## 2024-07-18 RX ADMIN — FENTANYL CITRATE 25 MCG: 50 INJECTION INTRAMUSCULAR; INTRAVENOUS at 10:55

## 2024-07-18 RX ADMIN — Medication 100 MCG: at 10:08

## 2024-07-18 RX ADMIN — ONDANSETRON 4 MG: 2 INJECTION INTRAMUSCULAR; INTRAVENOUS at 09:56

## 2024-07-18 RX ADMIN — Medication 100 MCG: at 10:46

## 2024-07-18 RX ADMIN — PROPOFOL 170 MG: 10 INJECTION, EMULSION INTRAVENOUS at 09:52

## 2024-07-18 ASSESSMENT — PAIN DESCRIPTION - DESCRIPTORS: DESCRIPTORS: SORE

## 2024-07-18 ASSESSMENT — PAIN - FUNCTIONAL ASSESSMENT
PAIN_FUNCTIONAL_ASSESSMENT: 0-10

## 2024-07-18 NOTE — OP NOTE
Operative Note      Patient: Blake Gomez  YOB: 1960  MRN: [id number]    Date of Procedure: 7/18/2024    Pre-Op Diagnosis Codes:     * Benign prostatic hyperplasia with lower urinary tract symptoms, symptom details unspecified [N40.1]     * Renal calculus, left [N20.0]    Post-Operative Diagnosis: same      Procedure Performed:   1. Left flexible ureteroscopy of ureter and flexible nephroscopy kidney  2. Holmium laser lithotripsy  3. Fluoroscopy, physician monitored, less than 1hr,  retrograde with interpretation  4. Left Ureteral stent exchange, string remained attached  5. Basket extraction of stones  6. Photovaporization of prostate (PVP)  - Green light laser vaporization      Drains: 22F 3-way Limon with 40cc of sterile water in the balloon     Surgeon:  PLACIDO HARDING MD  Assistant: scrub  Anesthesia: General     Drains/Tubes: 7 Frech JJ ureteral stent , 26cm  Estimated Blood Loss: 3 mL   Complications: None  Specimen: None      Findings:    -10 mm stone upper Left kidney, pulverized to dust pieces small enough to pass and then about 5 3 to 4 mm fragment, extracted with basket  -retrograde interpretated by me after 20ml contrast injected,  it was normal;   no extravasation, stent in proper position.  -prostate volume 70-80 mL, small median bar  -Bladder grossly normal, no mucosal lesions but mild to moderate trabeculation  -Digital rectal exam normal  -Ureteral orifice ease uninvolved in the greenlight resection, see the photos  Electronically signed by Placido Harding MD on 7/18/2024 at 11:35 AM  Findings:  Infection Present At Time Of Surgery (PATOS) (choose all levels that have infection present):  No infection present      Indications: See consult.  He had a large left distal ureteral stone a large bladder stone which was treated now as a stent indwelling is get significant BPH and left renal stones and treat renal stones and deal with his prostate outlet.      Procedure:  After

## 2024-07-18 NOTE — PROGRESS NOTES
Pt arrived from [facility] to PACU bay 5. Reported received from OR staff. Pt arousable to voice. Limon cath in place with CBI. Pt on 6L NC, NSR, and VSS. Will continue to monitor.

## 2024-07-18 NOTE — H&P
Urology Attending Consult Note     Reason for Consultation:  nephrolithiasis, flank pain     Chief Complaint: \"I have a stone\"  HPI:  Blake is a 64 y.o. male who presented with flank pain which prompted CT showed a 8mm ureter stone.  That was treated with a stent placed as well as a bladder stone treatment.  Now here for follow-up BPH intervention and removal of left renal stones.  No fevers.         Past Medical History        Past Medical History:   Diagnosis Date    Depression      Diabetes mellitus (HCC)      Hypertension              Past Surgical History   History reviewed. No pertinent surgical history.        Medication List reviewed:      Current Facility-Administered Medications             Current Facility-Administered Medications   Medication Dose Route Frequency Provider Last Rate Last Admin    lactated ringers IV soln infusion   IntraVENous Continuous Salo Sales  mL/hr at 06/27/24 0652 New Bag at 06/27/24 0652    sodium chloride flush 0.9 % injection 5-40 mL  5-40 mL IntraVENous 2 times per day Salo Sales MD        sodium chloride flush 0.9 % injection 5-40 mL  5-40 mL IntraVENous PRN Salo Sales MD        0.9 % sodium chloride infusion   IntraVENous PRN Salo Sales MD        ceFAZolin (ANCEF) 2,000 mg in sodium chloride 0.9 % 50 mL IVPB (mini-bag)  2,000 mg IntraVENous Once Placido Harding MD                No Known Allergies     Family History         Family History   Problem Relation Age of Onset    Heart Disease Mother      High Blood Pressure Mother      Depression Mother      Heart Disease Father      Heart Attack Father      High Blood Pressure Sister      No Known Problems Brother      Heart Disease Maternal Grandmother      High Blood Pressure Maternal Grandmother      Heart Disease Maternal Grandfather      High Blood Pressure Maternal Grandfather      Heart Disease Paternal Grandmother      High Blood Pressure Paternal

## 2024-07-18 NOTE — DISCHARGE INSTRUCTIONS
Great news, the residual stone in the kidney was pulverized and extracted on the left side!    You did have blood in urine and some swelling in kidney.  We deployed a new stent.  The stent will help drain your kidney until you recover from your surgery.     You may pass small debris or bloody urine before, during and after the ureteral stent is removed.   Drink plenty of liquids.   You will feel the stent in your bladder and your urine will be bloody the entire time.  See details below.       ^*^*^ We will remove stent on Monday morning when we take out your Limon catheter -8:30 AM [facility] office  -Hold Aspirin or other blood thinners until urine clear yellow, light pink.  -Expect light pink, light red urine on and off a few weeks.   If passing large jelly like clots or catheter not draining, please call or go to ER ASAP    Our office will call you to arrange a follow up kidney ultrasound in 6 weeks to ensure the kidney back to normal.       If you have questions, call my nurse Jing Srinivasan - [phone]  Or the main office.    Placido Harding MD  Office: [phone]          =========================   What to Expect at Home  Your Recovery    Prostate outlet surgery to remove prostate tissue. It is done when an overgrown prostate gland is pressing on the urethra and making it hard for a man to urinate.    You may need a urinary catheter for a short time. It is a flexible plastic tube used to drain urine from your bladder when you can't urinate on your own. If it is still in place when you go home, your doctor will give you instructions on how to care for your catheter.    For several days after surgery, you may feel burning when you urinate. Your urine may be pink for 1 to 3 weeks after surgery. You also may have bladder cramps, or spasms. Your doctor may give you medicine to help control the spasms.    You may still feel like you need to urinate often in the weeks after your surgery. It often takes up to 6

## 2024-07-18 NOTE — ANESTHESIA PRE PROCEDURE
are noted.   Left ventricular size is decreased.   There is mild concentric left ventricular hypertrophy.   Normal left ventricular diastolic filling pressure.   Aortic valve sclerosis without aortic stenosis.       Neuro/Psych:   (+) depression/anxiety             GI/Hepatic/Renal:   (+) liver disease (fatty liver, liver cyst):, renal disease: CRI          Endo/Other:    (+) DiabetesType II DM.                 Abdominal:             Vascular:          Other Findings:         Anesthesia Plan      general     ASA 3       Induction: intravenous.    MIPS: Postoperative opioids intended and Prophylactic antiemetics administered.  Anesthetic plan and risks discussed with patient.      Plan discussed with CRNA.                  Jersey Kahn MD   7/18/2024

## 2024-07-18 NOTE — ANESTHESIA POSTPROCEDURE EVALUATION
Department of Anesthesiology  Postprocedure Note    Patient: Blake Gomez  MRN: [id number]  YOB: 1960  Date of evaluation: 7/18/2024    Procedure Summary       Date: 07/18/24 Room / Location: [facility]    Anesthesia Start: 0945 Anesthesia Stop: 1144    Procedures:       CYSTOSCOPY, LEFT FLEXIBLE URETEROSCOPY, STONE MANIPULATION, HOLMIUM LASER (Left)      LEFT STENT INSERTION (Left: Bladder)      GREEN LIGHT LASER OF THE PROSTATE (Left) Diagnosis:       Benign prostatic hyperplasia with lower urinary tract symptoms, symptom details unspecified      Renal calculus, left      (Benign prostatic hyperplasia with lower urinary tract symptoms, symptom details unspecified [N40.1])      (Renal calculus, left [N20.0])    Surgeons: Placido Harding MD Responsible Provider: Jersey Kahn MD    Anesthesia Type: general ASA Status: 3            Anesthesia Type: No value filed.    Reno Phase I: Reno Score: 10    Reno Phase II: Reno Score: 10    Anesthesia Post Evaluation    Patient location during evaluation: PACU  Patient participation: complete - patient participated  Level of consciousness: awake  Airway patency: patent  Nausea & Vomiting: no nausea and no vomiting  Cardiovascular status: blood pressure returned to baseline  Respiratory status: acceptable  Hydration status: stable  Comments: Vital signs stable  OK to discharge from Stage I post anesthesia care.  Care transferred from Anesthesiology department on discharge from perioperative area   Multimodal analgesia pain management approach  Pain management: satisfactory to patient    No notable events documented.

## 2024-07-18 NOTE — PROGRESS NOTES
Pt awake and alert. Pt on RA, VSS. Pt reports mild pain and denies nausea, tolerating PO. Skin warm, castaneda cath in place with leg stabilizer. Pt meets criteria to be discharged from Phase 1.

## 2024-07-18 NOTE — PROGRESS NOTES
Discharge instructions review with patient and brother/Michel and cousin/Joel. All home medications have been reviewed, pt v/u. Pt discharged via wheelchair. Pt discharged with castaneda cath and all belongings. Family taking stable pt home.

## 2024-08-08 NOTE — PROGRESS NOTES
Behavioral Health Consultation- Follow UP  TORRI Neri  8/12/2024   3:40 PM      Blake Gomez, was evaluated through a synchronous (real-time) audio-video encounter. The patient (or guardian if applicable) is aware that this is a billable service, which includes applicable co-pays. This Virtual Visit was conducted with patient's (and/or legal guardian's) consent. Verbal consent to proceed has been obtained within the past 12 months. Patient identification was verified, and a caregiver was present when appropriate. The patient was located in a state where the provider was licensed to provide care.    Time spent with Patient: 30 minutes  This is patient's second  [facility] appointment.    Reason for Consult:    Chief Complaint   Patient presents with    Anxiety     Referring Provider: Marion Sena, APRN - CNP  [address] [place] [address]    Patient provided informed consent for the behavioral health program. Discussed with patient model of service to include the limits of confidentiality (i.e. abuse reporting, suicide intervention, etc.) and short-term intervention focused approach. Pt indicated understanding. Feedback given to PCP.    Verified the following information:  Patient's identification: Yes  Patient location: [address] [place] [address]   Patient's call back number: [phone]   Patient's emergency contact's name and number, as well as permission to contact them if needed: Extended Emergency Contact Information  Primary Emergency Contact: Joel Mensah  Address: [address] [place] [address] [place]  Home Phone: [phone]  Work Phone: [phone]  Mobile Phone: [phone]  Relation: Other   needed? No  Secondary Emergency Contact: Chaya Gomez  Address: [address] [place] [address]  Home Phone: [phone]  Work Phone: [phone]  Mobile Phone: [phone]  Relation:

## 2024-08-12 ENCOUNTER — TELEMEDICINE (OUTPATIENT)
Dept: PSYCHOLOGY | Age: 64
End: 2024-08-12
Payer: MEDICARE

## 2024-08-12 DIAGNOSIS — F41.9 ANXIETY: Primary | ICD-10-CM

## 2024-08-12 PROCEDURE — 1123F ACP DISCUSS/DSCN MKR DOCD: CPT | Performed by: SOCIAL WORKER

## 2024-08-12 PROCEDURE — 1036F TOBACCO NON-USER: CPT | Performed by: SOCIAL WORKER

## 2024-08-12 PROCEDURE — 90832 PSYTX W PT 30 MINUTES: CPT | Performed by: SOCIAL WORKER

## 2024-08-12 NOTE — PATIENT INSTRUCTIONS
When you think \"It's going to be bad news.\" Tell yourself \"So far it's been good news.\" \"I felt like this before, and it went away.\"  Then use distraction: Go outside, watch something funny on TV, color/draw.

## 2024-08-15 DIAGNOSIS — E11.9 TYPE 2 DIABETES MELLITUS WITHOUT COMPLICATION, WITHOUT LONG-TERM CURRENT USE OF INSULIN (HCC): ICD-10-CM

## 2024-08-16 RX ORDER — CHLORAL HYDRATE 500 MG
CAPSULE ORAL
Qty: 90 CAPSULE | Refills: 1 | Status: SHIPPED | OUTPATIENT
Start: 2024-08-16

## 2024-08-16 RX ORDER — METFORMIN HYDROCHLORIDE 750 MG/1
750 TABLET, EXTENDED RELEASE ORAL
Qty: 90 TABLET | Refills: 0 | OUTPATIENT
Start: 2024-08-16

## 2024-08-16 RX ORDER — DAPAGLIFLOZIN 10 MG/1
TABLET, FILM COATED ORAL
Qty: 90 TABLET | Refills: 1 | Status: SHIPPED | OUTPATIENT
Start: 2024-08-16

## 2024-08-16 NOTE — TELEPHONE ENCOUNTER
LOV 6/17/2024    Future Appointments   Date Time Provider Department Center   9/16/2024 10:00 AM [facility] RM 1 [facility] Rad   9/23/2024  2:30 PM Della Eaton L[facility]W [place] [facility]

## 2024-08-26 RX ORDER — ATORVASTATIN CALCIUM 40 MG/1
40 TABLET, FILM COATED ORAL DAILY
Qty: 90 TABLET | Refills: 1 | Status: SHIPPED | OUTPATIENT
Start: 2024-08-26

## 2024-08-26 NOTE — TELEPHONE ENCOUNTER
Future Appointments   Date Time Provider Department Center   9/16/2024 10:00 AM [facility]   9/23/2024  2:30 PM Della Eaton [facility] [place] [facility] 6/17/2024

## 2024-08-27 DIAGNOSIS — F41.9 ANXIETY: ICD-10-CM

## 2024-08-27 DIAGNOSIS — F32.A DEPRESSION, UNSPECIFIED DEPRESSION TYPE: ICD-10-CM

## 2024-08-27 NOTE — TELEPHONE ENCOUNTER
WHICH STRENGTH OF BUSPAR IS PATIENT TAKING??    6/17/2024    Future Appointments   Date Time Provider Department Center   9/16/2024 10:00 AM [facility] RM 1 [facility] Rebecca Rad   9/23/2024  2:30 PM Della Eaton [facility] [place] [facility]

## 2024-08-28 RX ORDER — BUSPIRONE HYDROCHLORIDE 10 MG/1
TABLET ORAL
Qty: 270 TABLET | Refills: 1 | OUTPATIENT
Start: 2024-08-28

## 2024-08-28 RX ORDER — VENLAFAXINE HYDROCHLORIDE 150 MG/1
CAPSULE, EXTENDED RELEASE ORAL
Qty: 90 CAPSULE | Refills: 1 | Status: SHIPPED | OUTPATIENT
Start: 2024-08-28

## 2024-08-28 RX ORDER — BUSPIRONE HYDROCHLORIDE 15 MG/1
TABLET ORAL
Qty: 270 TABLET | Refills: 0 | Status: SHIPPED | OUTPATIENT
Start: 2024-08-28

## 2024-09-16 ENCOUNTER — HOSPITAL ENCOUNTER (OUTPATIENT)
Dept: MRI IMAGING | Age: 64
Discharge: HOME | End: 2024-09-16
Attending: UROLOGY
Payer: MEDICARE

## 2024-09-16 DIAGNOSIS — D41.02 NEOPLASM OF UNCERTAIN BEHAVIOR OF LEFT KIDNEY: ICD-10-CM

## 2024-09-16 LAB
PERFORMED ON: NORMAL
POC CREATININE: 1.1 MG/DL (ref 0.8–1.3)
POC SAMPLE TYPE: NORMAL

## 2024-09-16 PROCEDURE — 74183 MRI ABD W/O CNTR FLWD CNTR: CPT

## 2024-09-16 PROCEDURE — 82565 ASSAY OF CREATININE: CPT

## 2024-09-16 PROCEDURE — A9579 GAD-BASE MR CONTRAST NOS,1ML: HCPCS | Performed by: UROLOGY

## 2024-09-16 PROCEDURE — 6360000004 HC RX CONTRAST MEDICATION: Performed by: UROLOGY

## 2024-09-16 RX ADMIN — GADOTERIDOL 20 ML: 279.3 INJECTION, SOLUTION INTRAVENOUS at 11:07

## 2024-10-22 DIAGNOSIS — F41.9 ANXIETY: ICD-10-CM

## 2024-10-23 RX ORDER — CHLORAL HYDRATE 500 MG
CAPSULE ORAL
Qty: 90 CAPSULE | Refills: 1 | OUTPATIENT
Start: 2024-10-23

## 2024-10-23 RX ORDER — BUSPIRONE HYDROCHLORIDE 10 MG/1
TABLET ORAL
Qty: 270 TABLET | Refills: 1 | OUTPATIENT
Start: 2024-10-23

## 2024-11-27 RX ORDER — CHLORAL HYDRATE 500 MG
CAPSULE ORAL
Qty: 90 CAPSULE | Refills: 1 | Status: SHIPPED | OUTPATIENT
Start: 2024-11-27

## 2024-12-02 RX ORDER — ATORVASTATIN CALCIUM 40 MG/1
40 TABLET, FILM COATED ORAL DAILY
Qty: 90 TABLET | Refills: 1 | Status: SHIPPED | OUTPATIENT
Start: 2024-12-02

## 2024-12-19 DIAGNOSIS — F32.A DEPRESSION, UNSPECIFIED DEPRESSION TYPE: ICD-10-CM

## 2024-12-19 DIAGNOSIS — F41.9 ANXIETY: ICD-10-CM

## 2024-12-19 RX ORDER — QUETIAPINE FUMARATE 200 MG/1
200 TABLET, FILM COATED ORAL 2 TIMES DAILY
Qty: 180 TABLET | Refills: 1 | Status: SHIPPED | OUTPATIENT
Start: 2024-12-19

## 2025-01-23 RX ORDER — CHLORAL HYDRATE 500 MG
CAPSULE ORAL
Qty: 100 CAPSULE | Refills: 1 | Status: SHIPPED | OUTPATIENT
Start: 2025-01-23

## 2025-02-07 DIAGNOSIS — F41.9 ANXIETY: ICD-10-CM

## 2025-02-07 DIAGNOSIS — E11.9 TYPE 2 DIABETES MELLITUS WITHOUT COMPLICATION, WITHOUT LONG-TERM CURRENT USE OF INSULIN: ICD-10-CM

## 2025-02-07 DIAGNOSIS — F32.A DEPRESSION, UNSPECIFIED DEPRESSION TYPE: ICD-10-CM

## 2025-02-07 RX ORDER — DAPAGLIFLOZIN 10 MG/1
TABLET, FILM COATED ORAL
Qty: 90 TABLET | Refills: 0 | Status: SHIPPED | OUTPATIENT
Start: 2025-02-07

## 2025-02-07 RX ORDER — BUSPIRONE HYDROCHLORIDE 10 MG/1
TABLET ORAL
Qty: 270 TABLET | Refills: 0 | Status: SHIPPED | OUTPATIENT
Start: 2025-02-07

## 2025-02-07 RX ORDER — CHLORAL HYDRATE 500 MG
CAPSULE ORAL
Qty: 100 CAPSULE | Refills: 1 | Status: SHIPPED | OUTPATIENT
Start: 2025-02-07

## 2025-02-07 RX ORDER — VENLAFAXINE HYDROCHLORIDE 150 MG/1
CAPSULE, EXTENDED RELEASE ORAL
Qty: 90 CAPSULE | Refills: 0 | Status: SHIPPED | OUTPATIENT
Start: 2025-02-07

## 2025-02-07 NOTE — TELEPHONE ENCOUNTER
Per other encounter, Yasir Estrada has LVM pt to call and schedule    According to pt chart, pt last saw [facility], confirm if pt wants to stay with [facility] or switch back to [facility].

## 2025-02-07 NOTE — TELEPHONE ENCOUNTER
According to pt chart, pt last saw [facility], confirm if pt wants to stay with [facility] or switch back to [facility].

## 2025-02-07 NOTE — TELEPHONE ENCOUNTER
Last Office Visit: 1/26/2024 Provider: ANGELINA  **Is provider OOT? No      **If no OV, when does pt need to be seen? overdue  **Has patient already had 30 day supply? No    Lab orders needed? no   Encounter provider correct? Yes If not, change provider  Script changes since last refill? no    LAST LABS:   CBC:   Lab Results   Component Value Date    WBC 9.4 06/17/2024    HGB 16.3 06/17/2024    HCT 48.6 06/17/2024    MCV 78.5 (L) 06/17/2024     06/17/2024     CMP:   Lab Results   Component Value Date     06/17/2024    K 4.7 06/17/2024     06/17/2024    CO2 23 06/17/2024    BUN 27 (H) 06/17/2024    CREATININE 1.1 09/16/2024    GLUCOSE 144 (H) 06/17/2024    CALCIUM 9.9 06/17/2024    BILITOT 0.4 06/17/2024    ALKPHOS 127 06/17/2024    AST 22 06/17/2024    ALT 38 06/17/2024    LABGLOM 74 09/16/2024    GFRAA >60 09/15/2022    AGRATIO 2.2 06/17/2024          Last 3 Lipids:   Lab Results   Component Value Date    CHOL 138 01/26/2024    CHOL 166 01/18/2023    CHOL 160 01/26/2022     Lab Results   Component Value Date    TRIG 217 (H) 01/26/2024    TRIG 246 (H) 01/18/2023    TRIG 161 (H) 01/26/2022     Lab Results   Component Value Date    HDL 30 (L) 01/26/2024    HDL 32 (L) 01/18/2023    HDL 32 (L) 01/26/2022     Lab Results   Component Value Date    LDL 65 01/26/2024    LDL 85 01/18/2023    LDL 96 01/26/2022     Lab Results   Component Value Date    VLDL 43 01/26/2024    VLDL 49 01/18/2023    VLDL 32 01/26/2022

## 2025-02-07 NOTE — TELEPHONE ENCOUNTER
Front please call pt and kike f/u with [facility]    Last Office Visit: 1/26/2024 Provider: ANGELINA  **Is provider OOT? No    Next Office Visit:no  Follow up with Dr. Yeung in 2-3 months       Encounter provider correct? Yes If not, change provider  Script changes since last refill? no    LAST LABS:   CMP:   Lab Results   Component Value Date     06/17/2024    K 4.7 06/17/2024     06/17/2024    CO2 23 06/17/2024    BUN 27 (H) 06/17/2024    CREATININE 1.1 09/16/2024    GLUCOSE 144 (H) 06/17/2024    CALCIUM 9.9 06/17/2024    BILITOT 0.4 06/17/2024    ALKPHOS 127 06/17/2024    AST 22 06/17/2024    ALT 38 06/17/2024    LABGLOM 74 09/16/2024    GFRAA >60 09/15/2022    AGRATIO 2.2 06/17/2024

## 2025-02-10 NOTE — TELEPHONE ENCOUNTER
2.10.2025- second attempt  Called pt at [phone], LVM to call and schedule w/ [facility] or [facility] per PMKay

## 2025-02-10 NOTE — TELEPHONE ENCOUNTER
2.10.2025- second attempt  Called pt at [phone], LVM to call and schedule w/ [facility] or [facility] per PMKay

## 2025-02-12 RX ORDER — LABETALOL 100 MG/1
100 TABLET, FILM COATED ORAL 2 TIMES DAILY
Qty: 90 TABLET | Refills: 0 | Status: SHIPPED | OUTPATIENT
Start: 2025-02-12

## 2025-02-12 RX ORDER — CLONIDINE HYDROCHLORIDE 0.2 MG/1
TABLET ORAL
Qty: 60 TABLET | Refills: 0 | Status: SHIPPED | OUTPATIENT
Start: 2025-02-12

## 2025-02-12 RX ORDER — VALSARTAN AND HYDROCHLOROTHIAZIDE 320; 25 MG/1; MG/1
TABLET, FILM COATED ORAL
Qty: 30 TABLET | Refills: 0 | Status: SHIPPED | OUTPATIENT
Start: 2025-02-12

## 2025-02-22 DIAGNOSIS — F41.9 ANXIETY: ICD-10-CM

## 2025-02-24 RX ORDER — BUSPIRONE HYDROCHLORIDE 15 MG/1
TABLET ORAL
Qty: 270 TABLET | Refills: 0 | Status: SHIPPED | OUTPATIENT
Start: 2025-02-24

## 2025-02-24 NOTE — TELEPHONE ENCOUNTER
LOV 6/17/2024    Future Appointments   Date Time Provider Department Center   3/14/2025 12:15 PM Francis Yeung MD [facility]

## 2025-03-10 ENCOUNTER — TRANSCRIBE ORDERS (OUTPATIENT)
Dept: ADMINISTRATIVE | Age: 65
End: 2025-03-10

## 2025-03-10 DIAGNOSIS — N40.1 BENIGN PROSTATIC HYPERPLASIA WITH LOWER URINARY TRACT SYMPTOMS, SYMPTOM DETAILS UNSPECIFIED: ICD-10-CM

## 2025-03-10 DIAGNOSIS — D41.02 NEOPLASM OF UNCERTAIN BEHAVIOR OF LEFT KIDNEY: ICD-10-CM

## 2025-03-10 DIAGNOSIS — N20.0 CALCULUS OF KIDNEY: Primary | ICD-10-CM

## 2025-03-10 NOTE — TELEPHONE ENCOUNTER
Last Office Visit: 1/26/2024 Provider: ANGELINA  **Is provider OOT? No    Next Office Visit: 3/14/2025 Provider: OMAR    Pending until pt appt for 1 yr supplies. Routing to [facility]

## 2025-03-13 NOTE — PROGRESS NOTES
[facility]   Cardiac Follow up     Referring Provider:  Marion Sena, APRN - CNP     Chief Complaint   Patient presents with    Follow-up    Hypertension    Hyperlipidemia        Blake Gomez   1960    History of Present Illness:   Blake Gomez is a 64 y.o. male who is here today for follow up for hypertension, dizziness, remote MI, and anxiety. At his last visit an echocardiogram and renal US were ordered but not yet completed. He ws instructed to take Verapamil in the mornings. Renal artery US 6/10/2022 showed an EF of no evidence of bilateral renal artery stenosis, left remal mass measuring 2.5 cm in size. Last visit started clonidine 0.2 mg twice a day.     Today he states he has been feeling well since his last visit. He is tolerating his medications, no longer taking Verapamil. His blood pressure at home off Verapamil running- 120/80. Patient currently denies any weight gain, edema, palpitations, chest pain, shortness of breath, dizziness, and syncope.          Past Medical History:   has a past medical history of Depression, Diabetes mellitus (HCC), and Hypertension.    Surgical History:   has a past surgical history that includes Cystoscopy (N/A, 6/27/2024); Ureter surgery (Left, 7/18/2024); Cystoscopy (Left, 7/18/2024); and TURP (Left, 7/18/2024).     Social History:   reports that he has never smoked. He has never used smokeless tobacco. He reports that he does not currently use alcohol. He reports that he does not currently use drugs.     Family History:  family history includes Depression in his mother; Heart Attack in his father; Heart Disease in his father, maternal grandfather, maternal grandmother, mother, paternal grandfather, and paternal grandmother; High Blood Pressure in his maternal grandfather, maternal grandmother, mother, paternal grandfather, paternal grandmother, and sister; No Known Problems in his brother.     Home Medications:  Prior to Admission

## 2025-03-14 ENCOUNTER — OFFICE VISIT (OUTPATIENT)
Dept: CARDIOLOGY CLINIC | Age: 65
End: 2025-03-14
Payer: MEDICARE

## 2025-03-14 VITALS
BODY MASS INDEX: 34.51 KG/M2 | HEART RATE: 94 BPM | DIASTOLIC BLOOD PRESSURE: 84 MMHG | SYSTOLIC BLOOD PRESSURE: 120 MMHG | HEIGHT: 69 IN | WEIGHT: 233 LBS | OXYGEN SATURATION: 96 %

## 2025-03-14 DIAGNOSIS — E78.2 MIXED HYPERLIPIDEMIA: ICD-10-CM

## 2025-03-14 DIAGNOSIS — E78.2 MIXED HYPERLIPIDEMIA: Primary | ICD-10-CM

## 2025-03-14 DIAGNOSIS — I25.10 CORONARY ARTERY DISEASE INVOLVING NATIVE CORONARY ARTERY OF NATIVE HEART WITHOUT ANGINA PECTORIS: ICD-10-CM

## 2025-03-14 DIAGNOSIS — I10 PRIMARY HYPERTENSION: ICD-10-CM

## 2025-03-14 PROCEDURE — G8417 CALC BMI ABV UP PARAM F/U: HCPCS | Performed by: INTERNAL MEDICINE

## 2025-03-14 PROCEDURE — 99214 OFFICE O/P EST MOD 30 MIN: CPT | Performed by: INTERNAL MEDICINE

## 2025-03-14 PROCEDURE — 3074F SYST BP LT 130 MM HG: CPT | Performed by: INTERNAL MEDICINE

## 2025-03-14 PROCEDURE — 1123F ACP DISCUSS/DSCN MKR DOCD: CPT | Performed by: INTERNAL MEDICINE

## 2025-03-14 PROCEDURE — 1159F MED LIST DOCD IN RCRD: CPT | Performed by: INTERNAL MEDICINE

## 2025-03-14 PROCEDURE — 1036F TOBACCO NON-USER: CPT | Performed by: INTERNAL MEDICINE

## 2025-03-14 PROCEDURE — 3079F DIAST BP 80-89 MM HG: CPT | Performed by: INTERNAL MEDICINE

## 2025-03-14 PROCEDURE — 3017F COLORECTAL CA SCREEN DOC REV: CPT | Performed by: INTERNAL MEDICINE

## 2025-03-14 PROCEDURE — G8427 DOCREV CUR MEDS BY ELIG CLIN: HCPCS | Performed by: INTERNAL MEDICINE

## 2025-03-14 RX ORDER — CLONIDINE HYDROCHLORIDE 0.2 MG/1
0.2 TABLET ORAL 2 TIMES DAILY
Qty: 180 TABLET | Refills: 3 | Status: SHIPPED | OUTPATIENT
Start: 2025-03-14

## 2025-03-14 RX ORDER — VALSARTAN AND HYDROCHLOROTHIAZIDE 320; 25 MG/1; MG/1
1 TABLET, FILM COATED ORAL DAILY
Qty: 90 TABLET | Refills: 3 | Status: SHIPPED | OUTPATIENT
Start: 2025-03-14

## 2025-03-14 RX ORDER — LABETALOL 100 MG/1
100 TABLET, FILM COATED ORAL 2 TIMES DAILY
Qty: 180 TABLET | Refills: 3 | Status: SHIPPED | OUTPATIENT
Start: 2025-03-14

## 2025-03-14 RX ORDER — VERAPAMIL HYDROCHLORIDE 240 MG/1
240 TABLET, FILM COATED, EXTENDED RELEASE ORAL NIGHTLY
Qty: 90 TABLET | Refills: 3 | Status: SHIPPED | OUTPATIENT
Start: 2025-03-14

## 2025-03-14 RX ORDER — QUETIAPINE 300 MG/1
300 TABLET, FILM COATED, EXTENDED RELEASE ORAL NIGHTLY
COMMUNITY

## 2025-03-14 NOTE — PATIENT INSTRUCTIONS
~Labs- CMP and fasting lipids     Cardiac medications reviewed including indications and pertinent side effects. Medication list updated at this visit.   Patient verbalizes understanding of the need for treatment and education has been provided at today's visit. Additional education material will be provided in after visit summary.    Check blood pressure and heart rate at home a few times per week- keep a log with dates and times and bring to office visit   Regular exercise and following a healthy diet encouraged   Follow up with me in 1 year   Follow up with Marion eSna APRN - CNP as well

## 2025-03-15 LAB
ALBUMIN SERPL-MCNC: 4.6 G/DL (ref 3.4–5)
ALBUMIN/GLOB SERPL: 2 {RATIO} (ref 1.1–2.2)
ALP SERPL-CCNC: 128 U/L (ref 40–129)
ALT SERPL-CCNC: 71 U/L (ref 10–40)
ANION GAP SERPL CALCULATED.3IONS-SCNC: 14 MMOL/L (ref 3–16)
AST SERPL-CCNC: 42 U/L (ref 15–37)
BILIRUB SERPL-MCNC: 0.6 MG/DL (ref 0–1)
BUN SERPL-MCNC: 28 MG/DL (ref 7–20)
CALCIUM SERPL-MCNC: 9.8 MG/DL (ref 8.3–10.6)
CHLORIDE SERPL-SCNC: 100 MMOL/L (ref 99–110)
CHOLEST SERPL-MCNC: 168 MG/DL (ref 0–199)
CO2 SERPL-SCNC: 25 MMOL/L (ref 21–32)
CREAT SERPL-MCNC: 1.5 MG/DL (ref 0.8–1.3)
GFR SERPLBLD CREATININE-BSD FMLA CKD-EPI: 51 ML/MIN/{1.73_M2}
GLUCOSE SERPL-MCNC: 161 MG/DL (ref 70–99)
HDLC SERPL-MCNC: 31 MG/DL (ref 40–60)
LDLC SERPL CALC-MCNC: 90 MG/DL
POTASSIUM SERPL-SCNC: 4.6 MMOL/L (ref 3.5–5.1)
PROT SERPL-MCNC: 6.9 G/DL (ref 6.4–8.2)
SODIUM SERPL-SCNC: 139 MMOL/L (ref 136–145)
TRIGL SERPL-MCNC: 237 MG/DL (ref 0–150)
VLDLC SERPL CALC-MCNC: 47 MG/DL

## 2025-03-17 ENCOUNTER — RESULTS FOLLOW-UP (OUTPATIENT)
Dept: CARDIOLOGY CLINIC | Age: 65
End: 2025-03-17

## 2025-03-17 DIAGNOSIS — N28.89 RENAL MASS: Primary | ICD-10-CM

## 2025-03-17 NOTE — TELEPHONE ENCOUNTER
Dr. Chiu this is a [facility] patient last seen 3/14/2025. Can you review labs? Liver enzymes are now elevated. They were in a normal range in 6/2024. Creatinine is 1.5, last chest it was 1.4 and normal prior to that. Patient is on Valsartan-HCT. .         Assessment:   HTN - stable  Obesity   Abnormal EKG  History of MI - details unknown   Fatigue - suspect sleep apnea. Ha sleep study scheduled but has not followed up. Encouraged to follow up sleep clinic.    HLD - stable. TG elevated - low carb diet. Continue lipitor 40 mg daily.   Left remal mass measuring 2.5 cm in size - recommend follow up w/ PCP to discuss next steps   Diabetes mellitus           Latest Reference Range & Units 03/14/25 12:49   Sodium 136 - 145 mmol/L 139   Potassium 3.5 - 5.1 mmol/L 4.6   Chloride 99 - 110 mmol/L 100   CARBON DIOXIDE 21 - 32 mmol/L 25   BUN,BUNPL 7 - 20 mg/dL 28 (H)   Creatinine 0.8 - 1.3 mg/dL 1.5 (H)   Anion Gap 3 - 16  14   Est, Glom Filt Rate >60  51 !   Glucose 70 - 99 mg/dL 161 (H)   Calcium 8.3 - 10.6 mg/dL 9.8   Total Protein 6.4 - 8.2 g/dL 6.9   Cholesterol, Total 0 - 199 mg/dL 168   HDL Cholesterol 40 - 60 mg/dL 31 (L)   LDL Cholesterol <100 mg/dL 90   Triglycerides 0 - 150 mg/dL 237 (H)   VLDL Not Established mg/dL 47   Albumin 3.4 - 5.0 g/dL 4.6   Albumin/Globulin Ratio 1.1 - 2.2  2.0   Alkaline Phosphatase 40 - 129 U/L 128   ALT 10 - 40 U/L 71 (H)   AST 15 - 37 U/L 42 (H)   Total Bilirubin 0.0 - 1.0 mg/dL 0.6

## 2025-03-17 NOTE — TELEPHONE ENCOUNTER
----- Message from Dr. Tonio Chiu MD sent at 3/17/2025  4:08 PM EDT -----  Liver enzymes are high also had a renal mass  Please order CT abdomen without contrast  ----- Message -----  From: Jayda Campo RN  Sent: 3/17/2025   9:53 AM EDT  To: Tonio Chiu MD

## 2025-04-04 ENCOUNTER — TELEPHONE (OUTPATIENT)
Dept: CARDIOLOGY CLINIC | Age: 65
End: 2025-04-04

## 2025-04-04 ENCOUNTER — HOSPITAL ENCOUNTER (OUTPATIENT)
Dept: CT IMAGING | Age: 65
Discharge: HOME | End: 2025-04-04
Attending: INTERNAL MEDICINE

## 2025-04-04 DIAGNOSIS — N28.89 RENAL MASS: ICD-10-CM

## 2025-04-04 NOTE — TELEPHONE ENCOUNTER
Ct dept sts RKG order CT renal without contrast, but this will not get the information RKG is looking for. Pt's Kidney function is high enough for contrast. Pt there now. Wants order for CT renal with and without.

## 2025-04-04 NOTE — TELEPHONE ENCOUNTER
VIV was covering while you were out.     Called and Spoke with CT.  Pt is being followed by Dr. Vaz for his renal mass and an MRI of his abdomen has been ordered by Dr. Vaz.    IANG ordered CT CT abdomen without contrast due to elevated liver enzymes      Pt did not have CT scan completed today and wants to know if he needs CT scan since he will be having an MRI.  If you still want CT scan a new order will need to be placed.

## 2025-04-04 NOTE — TELEPHONE ENCOUNTER
VIV was covering while you were out.    Called and Spoke with CT.  Pt is being followed by Dr. Vaz for his renal mass and an MRI of his abdomen has been ordered by Dr. Vaz.      Pt did not have CT scan completed today and wants to know if he needs CT scan since he will be having an MRI.  If you still want CT scan a new order will need to be placed.

## 2025-04-11 NOTE — TELEPHONE ENCOUNTER
Pt returned call. Relayed [facility] message, and advised pt to follow with Dr. Vaz regarding renal mass. Pt V/U.     Attempted to cancel CT order, but unable to due to error message below.

## 2025-05-03 DIAGNOSIS — F41.9 ANXIETY: ICD-10-CM

## 2025-05-05 RX ORDER — BUSPIRONE HYDROCHLORIDE 15 MG/1
TABLET ORAL
Qty: 270 TABLET | Refills: 0 | Status: SHIPPED | OUTPATIENT
Start: 2025-05-05

## 2025-05-05 RX ORDER — BUSPIRONE HYDROCHLORIDE 10 MG/1
10 TABLET ORAL 3 TIMES DAILY
Qty: 270 TABLET | Refills: 0 | OUTPATIENT
Start: 2025-05-05

## 2025-05-05 NOTE — TELEPHONE ENCOUNTER
Lov 6/17/2024    Future Appointments   Date Time Provider Department Center   5/13/2025 11:00 AM [facility] 1 [facility] [place] MRI Rebecca Rad

## 2025-05-12 RX ORDER — CHLORAL HYDRATE 500 MG
1000 CAPSULE ORAL 3 TIMES DAILY
Qty: 100 CAPSULE | Refills: 1 | Status: SHIPPED | OUTPATIENT
Start: 2025-05-12

## 2025-05-12 NOTE — TELEPHONE ENCOUNTER
Lov 6/17/2024    Future Appointments   Date Time Provider Department Center   5/13/2025 11:00 AM [facility] [place] [facility] RM 1 [facility] [place] [place] MRI Rebecca Rad

## 2025-05-13 ENCOUNTER — HOSPITAL ENCOUNTER (OUTPATIENT)
Dept: MRI IMAGING | Age: 65
Discharge: HOME | End: 2025-05-13
Attending: UROLOGY
Payer: MEDICARE

## 2025-05-13 DIAGNOSIS — D41.02 NEOPLASM OF UNCERTAIN BEHAVIOR OF LEFT KIDNEY: ICD-10-CM

## 2025-05-13 DIAGNOSIS — N40.1 BENIGN PROSTATIC HYPERPLASIA WITH LOWER URINARY TRACT SYMPTOMS, SYMPTOM DETAILS UNSPECIFIED: ICD-10-CM

## 2025-05-13 DIAGNOSIS — N20.0 CALCULUS OF KIDNEY: ICD-10-CM

## 2025-05-13 LAB
PERFORMED ON: NORMAL
POC CREATININE: 1.1 MG/DL (ref 0.8–1.3)
POC SAMPLE TYPE: NORMAL

## 2025-05-13 PROCEDURE — 82565 ASSAY OF CREATININE: CPT

## 2025-05-13 PROCEDURE — 74183 MRI ABD W/O CNTR FLWD CNTR: CPT

## 2025-05-13 PROCEDURE — A9579 GAD-BASE MR CONTRAST NOS,1ML: HCPCS | Performed by: UROLOGY

## 2025-05-13 PROCEDURE — 6360000004 HC RX CONTRAST MEDICATION: Performed by: UROLOGY

## 2025-05-13 RX ADMIN — GADOTERIDOL 20 ML: 279.3 INJECTION, SOLUTION INTRAVENOUS at 12:05

## 2025-05-17 DIAGNOSIS — F41.9 ANXIETY: ICD-10-CM

## 2025-05-18 DIAGNOSIS — E11.9 TYPE 2 DIABETES MELLITUS WITHOUT COMPLICATION, WITHOUT LONG-TERM CURRENT USE OF INSULIN (HCC): ICD-10-CM

## 2025-05-19 RX ORDER — BUSPIRONE HYDROCHLORIDE 15 MG/1
TABLET ORAL
Qty: 270 TABLET | Refills: 0 | Status: SHIPPED | OUTPATIENT
Start: 2025-05-19

## 2025-05-19 RX ORDER — DAPAGLIFLOZIN 10 MG/1
10 TABLET, FILM COATED ORAL EVERY MORNING
Qty: 90 TABLET | Refills: 0 | Status: SHIPPED | OUTPATIENT
Start: 2025-05-19

## 2025-05-19 NOTE — TELEPHONE ENCOUNTER
Is he seeing a psychiatrist? He is on quetiapine twice daily. If this was changed by another provider he should be getting refills from him/her

## 2025-05-22 DIAGNOSIS — F32.A DEPRESSION, UNSPECIFIED DEPRESSION TYPE: ICD-10-CM

## 2025-05-22 DIAGNOSIS — F41.9 ANXIETY: ICD-10-CM

## 2025-05-22 RX ORDER — VENLAFAXINE HYDROCHLORIDE 150 MG/1
CAPSULE, EXTENDED RELEASE ORAL DAILY
Qty: 90 CAPSULE | Refills: 1 | Status: SHIPPED | OUTPATIENT
Start: 2025-05-22

## 2025-05-23 RX ORDER — QUETIAPINE FUMARATE 100 MG/1
TABLET, FILM COATED ORAL
Qty: 30 TABLET | Refills: 1 | OUTPATIENT
Start: 2025-05-23

## 2025-05-25 DIAGNOSIS — F32.A DEPRESSION, UNSPECIFIED DEPRESSION TYPE: ICD-10-CM

## 2025-05-25 DIAGNOSIS — F41.9 ANXIETY: ICD-10-CM

## 2025-05-27 RX ORDER — ATORVASTATIN CALCIUM 40 MG/1
40 TABLET, FILM COATED ORAL DAILY
Qty: 30 TABLET | Refills: 0 | Status: SHIPPED | OUTPATIENT
Start: 2025-05-27

## 2025-05-29 RX ORDER — QUETIAPINE FUMARATE 200 MG/1
200 TABLET, FILM COATED ORAL 2 TIMES DAILY
Qty: 180 TABLET | Refills: 1 | OUTPATIENT
Start: 2025-05-29

## 2025-06-18 RX ORDER — CHLORAL HYDRATE 500 MG
1000 CAPSULE ORAL 3 TIMES DAILY
Qty: 90 CAPSULE | Refills: 2 | Status: SHIPPED | OUTPATIENT
Start: 2025-06-18

## 2025-06-23 RX ORDER — ATORVASTATIN CALCIUM 40 MG/1
40 TABLET, FILM COATED ORAL DAILY
Qty: 90 TABLET | Refills: 0 | Status: SHIPPED | OUTPATIENT
Start: 2025-06-23 | End: 2025-07-21

## 2025-07-21 ENCOUNTER — TELEPHONE (OUTPATIENT)
Dept: FAMILY MEDICINE CLINIC | Age: 65
End: 2025-07-21

## 2025-07-21 NOTE — TELEPHONE ENCOUNTER
Pharmacy called  They received an Rx for   QUEtiapine (SEROQUEL) 200 MG tablet TAKE 1 TABLET BY MOUTH TWICE A DAY     Calling to confirm you are aware he just received an Rx for QUEtiapine  mg qd from Dr. Cecille Swan

## 2025-08-20 ENCOUNTER — OFFICE VISIT (OUTPATIENT)
Dept: FAMILY MEDICINE CLINIC | Age: 65
End: 2025-08-20
Payer: MEDICARE

## 2025-08-20 VITALS
TEMPERATURE: 97.9 F | HEART RATE: 90 BPM | DIASTOLIC BLOOD PRESSURE: 88 MMHG | RESPIRATION RATE: 16 BRPM | BODY MASS INDEX: 32.18 KG/M2 | SYSTOLIC BLOOD PRESSURE: 134 MMHG | OXYGEN SATURATION: 97 % | WEIGHT: 218 LBS

## 2025-08-20 DIAGNOSIS — E78.2 MIXED HYPERLIPIDEMIA: ICD-10-CM

## 2025-08-20 DIAGNOSIS — E11.9 TYPE 2 DIABETES MELLITUS WITHOUT COMPLICATION, WITHOUT LONG-TERM CURRENT USE OF INSULIN (HCC): ICD-10-CM

## 2025-08-20 DIAGNOSIS — E13.22 SECONDARY DM WITH CKD STAGE 3 AND HYPERTENSION (HCC): Primary | ICD-10-CM

## 2025-08-20 DIAGNOSIS — I12.9 SECONDARY DM WITH CKD STAGE 3 AND HYPERTENSION (HCC): Primary | ICD-10-CM

## 2025-08-20 DIAGNOSIS — Z12.11 SCREEN FOR COLON CANCER: ICD-10-CM

## 2025-08-20 DIAGNOSIS — N18.30 SECONDARY DM WITH CKD STAGE 3 AND HYPERTENSION (HCC): ICD-10-CM

## 2025-08-20 DIAGNOSIS — N18.30 SECONDARY DM WITH CKD STAGE 3 AND HYPERTENSION (HCC): Primary | ICD-10-CM

## 2025-08-20 DIAGNOSIS — Z00.00 INITIAL MEDICARE ANNUAL WELLNESS VISIT: ICD-10-CM

## 2025-08-20 DIAGNOSIS — E13.22 SECONDARY DM WITH CKD STAGE 3 AND HYPERTENSION (HCC): ICD-10-CM

## 2025-08-20 DIAGNOSIS — I12.9 SECONDARY DM WITH CKD STAGE 3 AND HYPERTENSION (HCC): ICD-10-CM

## 2025-08-20 DIAGNOSIS — E66.9 OBESITY (BMI 30-39.9): ICD-10-CM

## 2025-08-20 DIAGNOSIS — F41.1 GENERALIZED ANXIETY DISORDER: ICD-10-CM

## 2025-08-20 DIAGNOSIS — H61.23 BILATERAL HEARING LOSS DUE TO CERUMEN IMPACTION: ICD-10-CM

## 2025-08-20 LAB — HBA1C MFR BLD: 6.7 %

## 2025-08-20 PROCEDURE — 83036 HEMOGLOBIN GLYCOSYLATED A1C: CPT | Performed by: NURSE PRACTITIONER

## 2025-08-20 PROCEDURE — 1123F ACP DISCUSS/DSCN MKR DOCD: CPT | Performed by: NURSE PRACTITIONER

## 2025-08-20 PROCEDURE — 69210 REMOVE IMPACTED EAR WAX UNI: CPT | Performed by: NURSE PRACTITIONER

## 2025-08-20 PROCEDURE — 1159F MED LIST DOCD IN RCRD: CPT | Performed by: NURSE PRACTITIONER

## 2025-08-20 PROCEDURE — G0438 PPPS, INITIAL VISIT: HCPCS | Performed by: NURSE PRACTITIONER

## 2025-08-20 PROCEDURE — 3079F DIAST BP 80-89 MM HG: CPT | Performed by: NURSE PRACTITIONER

## 2025-08-20 PROCEDURE — 3017F COLORECTAL CA SCREEN DOC REV: CPT | Performed by: NURSE PRACTITIONER

## 2025-08-20 PROCEDURE — 3044F HG A1C LEVEL LT 7.0%: CPT | Performed by: NURSE PRACTITIONER

## 2025-08-20 PROCEDURE — 3075F SYST BP GE 130 - 139MM HG: CPT | Performed by: NURSE PRACTITIONER

## 2025-08-20 RX ORDER — DAPAGLIFLOZIN 10 MG/1
10 TABLET, FILM COATED ORAL EVERY MORNING
Qty: 90 TABLET | Refills: 3 | Status: SHIPPED | OUTPATIENT
Start: 2025-08-20

## 2025-08-20 RX ORDER — FLUOXETINE 10 MG/1
10 CAPSULE ORAL DAILY
COMMUNITY
Start: 2025-08-13

## 2025-08-20 SDOH — ECONOMIC STABILITY: FOOD INSECURITY: WITHIN THE PAST 12 MONTHS, YOU WORRIED THAT YOUR FOOD WOULD RUN OUT BEFORE YOU GOT MONEY TO BUY MORE.: NEVER TRUE

## 2025-08-20 SDOH — ECONOMIC STABILITY: FOOD INSECURITY: WITHIN THE PAST 12 MONTHS, THE FOOD YOU BOUGHT JUST DIDN'T LAST AND YOU DIDN'T HAVE MONEY TO GET MORE.: NEVER TRUE

## 2025-08-20 ASSESSMENT — PATIENT HEALTH QUESTIONNAIRE - PHQ9
6. FEELING BAD ABOUT YOURSELF - OR THAT YOU ARE A FAILURE OR HAVE LET YOURSELF OR YOUR FAMILY DOWN: NOT AT ALL
8. MOVING OR SPEAKING SO SLOWLY THAT OTHER PEOPLE COULD HAVE NOTICED. OR THE OPPOSITE, BEING SO FIGETY OR RESTLESS THAT YOU HAVE BEEN MOVING AROUND A LOT MORE THAN USUAL: NOT AT ALL
SUM OF ALL RESPONSES TO PHQ QUESTIONS 1-9: 3
7. TROUBLE CONCENTRATING ON THINGS, SUCH AS READING THE NEWSPAPER OR WATCHING TELEVISION: NOT AT ALL
4. FEELING TIRED OR HAVING LITTLE ENERGY: NEARLY EVERY DAY
SUM OF ALL RESPONSES TO PHQ QUESTIONS 1-9: 3
SUM OF ALL RESPONSES TO PHQ QUESTIONS 1-9: 3
10. IF YOU CHECKED OFF ANY PROBLEMS, HOW DIFFICULT HAVE THESE PROBLEMS MADE IT FOR YOU TO DO YOUR WORK, TAKE CARE OF THINGS AT HOME, OR GET ALONG WITH OTHER PEOPLE: SOMEWHAT DIFFICULT
5. POOR APPETITE OR OVEREATING: NOT AT ALL
1. LITTLE INTEREST OR PLEASURE IN DOING THINGS: NOT AT ALL
3. TROUBLE FALLING OR STAYING ASLEEP: NOT AT ALL
9. THOUGHTS THAT YOU WOULD BE BETTER OFF DEAD, OR OF HURTING YOURSELF: NOT AT ALL
SUM OF ALL RESPONSES TO PHQ QUESTIONS 1-9: 3
2. FEELING DOWN, DEPRESSED OR HOPELESS: NOT AT ALL

## 2025-08-21 LAB
ALBUMIN SERPL-MCNC: 4.7 G/DL (ref 3.4–5)
ALBUMIN/GLOB SERPL: 1.8 {RATIO} (ref 1.1–2.2)
ALP SERPL-CCNC: 127 U/L (ref 40–129)
ALT SERPL-CCNC: 43 U/L (ref 10–40)
ANION GAP SERPL CALCULATED.3IONS-SCNC: 16 MMOL/L (ref 3–16)
AST SERPL-CCNC: 32 U/L (ref 15–37)
BASOPHILS # BLD: 0.1 K/UL (ref 0–0.2)
BASOPHILS NFR BLD: 0.8 %
BILIRUB SERPL-MCNC: 2.3 MG/DL (ref 0–1)
BUN SERPL-MCNC: 24 MG/DL (ref 7–20)
CALCIUM SERPL-MCNC: 9.8 MG/DL (ref 8.3–10.6)
CHLORIDE SERPL-SCNC: 100 MMOL/L (ref 99–110)
CHOLEST SERPL-MCNC: 189 MG/DL (ref 0–199)
CO2 SERPL-SCNC: 23 MMOL/L (ref 21–32)
CREAT SERPL-MCNC: 1.3 MG/DL (ref 0.8–1.3)
CREAT UR-MCNC: 148 MG/DL (ref 39–259)
DEPRECATED RDW RBC AUTO: 16.4 % (ref 12.4–15.4)
EOSINOPHIL # BLD: 0.7 K/UL (ref 0–0.6)
EOSINOPHIL NFR BLD: 7.6 %
GFR SERPLBLD CREATININE-BSD FMLA CKD-EPI: 60 ML/MIN/{1.73_M2}
GLUCOSE SERPL-MCNC: 125 MG/DL (ref 70–99)
HCT VFR BLD AUTO: 50 % (ref 40.5–52.5)
HDLC SERPL-MCNC: 37 MG/DL (ref 40–60)
HGB BLD-MCNC: 17 G/DL (ref 13.5–17.5)
LDLC SERPL CALC-MCNC: 110 MG/DL
LYMPHOCYTES # BLD: 2.3 K/UL (ref 1–5.1)
LYMPHOCYTES NFR BLD: 22.9 %
MCH RBC QN AUTO: 25.9 PG (ref 26–34)
MCHC RBC AUTO-ENTMCNC: 34 G/DL (ref 31–36)
MCV RBC AUTO: 76 FL (ref 80–100)
MICROALBUMIN UR DL<=1MG/L-MCNC: 1.32 MG/DL
MICROALBUMIN/CREAT UR: 8.9 MG/G (ref 0–30)
MONOCYTES # BLD: 0.8 K/UL (ref 0–1.3)
MONOCYTES NFR BLD: 8.3 %
NEUTROPHILS # BLD: 6 K/UL (ref 1.7–7.7)
NEUTROPHILS NFR BLD: 60.4 %
PLATELET # BLD AUTO: 185 K/UL (ref 135–450)
PMV BLD AUTO: 10.2 FL (ref 5–10.5)
POTASSIUM SERPL-SCNC: 4.3 MMOL/L (ref 3.5–5.1)
PROT SERPL-MCNC: 7.3 G/DL (ref 6.4–8.2)
RBC # BLD AUTO: 6.57 M/UL (ref 4.2–5.9)
SODIUM SERPL-SCNC: 139 MMOL/L (ref 136–145)
TRIGL SERPL-MCNC: 209 MG/DL (ref 0–150)
VLDLC SERPL CALC-MCNC: 42 MG/DL
WBC # BLD AUTO: 9.9 K/UL (ref 4–11)

## (undated) DEVICE — BAG DRNGE COMB PK

## (undated) DEVICE — DRAINBAG,ANTI-REFLUX TOWER,L/F,2000ML,LL: Brand: MEDLINE

## (undated) DEVICE — MHCZ CYSTO: Brand: MEDLINE INDUSTRIES, INC.

## (undated) DEVICE — 3M™ TEGADERM™ TRANSPARENT FILM DRESSING FRAME STYLE, 1624W, 2-3/8 IN X 2-3/4 IN (6 CM X 7 CM), 100/CT 4CT/CASE: Brand: 3M™ TEGADERM™

## (undated) DEVICE — XPS MOXY FIBER

## (undated) DEVICE — INVIEW CLEAR LEGGINGS: Brand: CONVERTORS

## (undated) DEVICE — SHEATH URET ACCS DIL 6 12FR OD14FR ID12FR L38CM PTFE LN

## (undated) DEVICE — FLEXIVA  PULSE  AND  FLEXIVA  PULSE  TRACTIP  LASER  FIBERS  ARE  HIGH  POWER  SINGLE-USE FIBER: Brand: FLEXIVA PULSE ID

## (undated) DEVICE — XPS LASER

## (undated) DEVICE — GOWN SIRUS NONREIN LG W/TWL: Brand: MEDLINE INDUSTRIES, INC.

## (undated) DEVICE — CYSTOSCOPE CONTINUOUS FLOW

## (undated) DEVICE — SYRINGE MED 10ML LUERLOCK TIP W/O SFTY DISP

## (undated) DEVICE — SEAL ENDOSCP INSTR BX PRT FOR ACC UPTO 3FR

## (undated) DEVICE — SOLUTION IRRIG 1000ML STRL H2O USP PLAS POUR BTL

## (undated) DEVICE — SYRINGE MED 10ML POLYPR GEN PURP FLAT TOP LUERLOCK TIP

## (undated) DEVICE — TUBING, SUCTION, 3/16" X 10', STRAIGHT: Brand: MEDLINE

## (undated) DEVICE — BOWL MED L 32OZ PLAS W/ MOLD GRAD EZ OPN PEEL PCH

## (undated) DEVICE — SNAPSECURE 3-WAY FOLEY DEVICE: Brand: MEDLINE

## (undated) DEVICE — GLOVE ORANGE PI 7   MSG9070

## (undated) DEVICE — Z INACTIVE USE 2635508 SOLUTION IRRIG 500ML 0.9% SOD CHL USP POUR PLAS BTL

## (undated) DEVICE — NITINOL STONE RETRIEVAL BASKET: Brand: ZERO TIP

## (undated) DEVICE — CONTAINER,SPECIMEN,STERILE PATH,4OZ: Brand: MEDLINE

## (undated) DEVICE — SOLUTION IRRIGATION STRL H2O 1000 ML UROMATIC CONTAINER

## (undated) DEVICE — CATHETER URETH 22FR 30CC BLLN F 3 W SPEC M RND TIP TWO

## (undated) DEVICE — GUIDEWIRE ENDOSCP L150CM DIA0.035IN TIP 3CM PTFE NIT

## (undated) DEVICE — STERILE POLYISOPRENE POWDER-FREE SURGICAL GLOVES: Brand: PROTEXIS

## (undated) DEVICE — SOLUTION IRRIG 2000ML STRL H2O UROMATIC PLAS CONT USP

## (undated) DEVICE — ADMINISTRATION SET PRIMARY 60 GTT 104 IN GRAVITY SMRTSITE

## (undated) DEVICE — CYSTO: Brand: MEDLINE INDUSTRIES, INC.

## (undated) DEVICE — CIRCUIT ANES L72IN 3L BACT AND VIR FLTR EL CONN SGL LIMB

## (undated) DEVICE — ADHESIVE LIQ 2OZ ADJUNCT FOR DSG MASTISOL

## (undated) DEVICE — GUIDEWIRE URO L150CM DIA .035IN STIFF NIT HYDRPHL STR TIP

## (undated) DEVICE — GAUZE,SPONGE,4"X4",8PLY,STRL,LF,10/TRAY: Brand: MEDLINE

## (undated) DEVICE — Device

## (undated) DEVICE — HOLMIUM MASTERPULSE HF

## (undated) DEVICE — BLADDER EVACUATOR: Brand: UROVAC BLADDER EVACUATOR

## (undated) DEVICE — CATHETER,FOLEY,100%SILICONE,20FR,10ML,LF: Brand: MEDLINE